# Patient Record
Sex: FEMALE | Race: WHITE | NOT HISPANIC OR LATINO | Employment: OTHER | URBAN - METROPOLITAN AREA
[De-identification: names, ages, dates, MRNs, and addresses within clinical notes are randomized per-mention and may not be internally consistent; named-entity substitution may affect disease eponyms.]

---

## 2021-01-01 ENCOUNTER — APPOINTMENT (OUTPATIENT)
Dept: GENERAL RADIOLOGY | Facility: CLINIC | Age: 76
DRG: 870 | End: 2021-01-01
Payer: MEDICARE

## 2021-01-01 ENCOUNTER — HOSPITAL ENCOUNTER (EMERGENCY)
Facility: CLINIC | Age: 76
Discharge: HOME | End: 2021-03-22
Attending: EMERGENCY MEDICINE | Admitting: EMERGENCY MEDICINE
Payer: MEDICARE

## 2021-01-01 ENCOUNTER — APPOINTMENT (OUTPATIENT)
Dept: CARDIOLOGY | Facility: CLINIC | Age: 76
DRG: 870 | End: 2021-01-01
Attending: INTERNAL MEDICINE
Payer: MEDICARE

## 2021-01-01 ENCOUNTER — APPOINTMENT (OUTPATIENT)
Dept: GENERAL RADIOLOGY | Facility: CLINIC | Age: 76
End: 2021-01-01
Attending: EMERGENCY MEDICINE
Payer: MEDICARE

## 2021-01-01 ENCOUNTER — APPOINTMENT (OUTPATIENT)
Dept: CT IMAGING | Facility: CLINIC | Age: 76
DRG: 870 | End: 2021-01-01
Attending: EMERGENCY MEDICINE
Payer: MEDICARE

## 2021-01-01 ENCOUNTER — APPOINTMENT (OUTPATIENT)
Dept: GENERAL RADIOLOGY | Facility: CLINIC | Age: 76
DRG: 870 | End: 2021-01-01
Attending: STUDENT IN AN ORGANIZED HEALTH CARE EDUCATION/TRAINING PROGRAM
Payer: MEDICARE

## 2021-01-01 ENCOUNTER — APPOINTMENT (OUTPATIENT)
Dept: GENERAL RADIOLOGY | Facility: CLINIC | Age: 76
DRG: 870 | End: 2021-01-01
Attending: EMERGENCY MEDICINE
Payer: MEDICARE

## 2021-01-01 ENCOUNTER — TRANSFERRED RECORDS (OUTPATIENT)
Dept: HEALTH INFORMATION MANAGEMENT | Facility: CLINIC | Age: 76
End: 2021-01-01

## 2021-01-01 ENCOUNTER — APPOINTMENT (OUTPATIENT)
Dept: GENERAL RADIOLOGY | Facility: CLINIC | Age: 76
DRG: 870 | End: 2021-01-01
Attending: NURSE PRACTITIONER
Payer: MEDICARE

## 2021-01-01 ENCOUNTER — APPOINTMENT (OUTPATIENT)
Dept: CT IMAGING | Facility: CLINIC | Age: 76
End: 2021-01-01
Attending: EMERGENCY MEDICINE
Payer: MEDICARE

## 2021-01-01 ENCOUNTER — APPOINTMENT (OUTPATIENT)
Dept: GENERAL RADIOLOGY | Facility: CLINIC | Age: 76
DRG: 870 | End: 2021-01-01
Attending: INTERNAL MEDICINE
Payer: MEDICARE

## 2021-01-01 ENCOUNTER — HOSPITAL ENCOUNTER (INPATIENT)
Facility: CLINIC | Age: 76
LOS: 20 days | DRG: 870 | End: 2021-12-18
Attending: EMERGENCY MEDICINE | Admitting: HOSPITALIST
Payer: MEDICARE

## 2021-01-01 VITALS
HEIGHT: 67 IN | BODY MASS INDEX: 33.27 KG/M2 | RESPIRATION RATE: 15 BRPM | OXYGEN SATURATION: 95 % | HEART RATE: 67 BPM | DIASTOLIC BLOOD PRESSURE: 84 MMHG | WEIGHT: 212 LBS | SYSTOLIC BLOOD PRESSURE: 149 MMHG | TEMPERATURE: 97.5 F

## 2021-01-01 VITALS
TEMPERATURE: 98.4 F | BODY MASS INDEX: 29.8 KG/M2 | DIASTOLIC BLOOD PRESSURE: 54 MMHG | WEIGHT: 190.26 LBS | SYSTOLIC BLOOD PRESSURE: 107 MMHG | OXYGEN SATURATION: 14 %

## 2021-01-01 DIAGNOSIS — R06.09 DYSPNEA ON EXERTION: ICD-10-CM

## 2021-01-01 DIAGNOSIS — J18.9 PNEUMONIA OF RIGHT LOWER LOBE DUE TO INFECTIOUS ORGANISM: ICD-10-CM

## 2021-01-01 DIAGNOSIS — U07.1 PNEUMONIA DUE TO 2019 NOVEL CORONAVIRUS: ICD-10-CM

## 2021-01-01 DIAGNOSIS — G93.40 ENCEPHALOPATHY: ICD-10-CM

## 2021-01-01 DIAGNOSIS — J12.82 PNEUMONIA DUE TO 2019 NOVEL CORONAVIRUS: ICD-10-CM

## 2021-01-01 DIAGNOSIS — J96.01 ACUTE RESPIRATORY FAILURE WITH HYPOXIA (H): ICD-10-CM

## 2021-01-01 LAB
ABO/RH(D): NORMAL
ALBUMIN SERPL-MCNC: 1.8 G/DL (ref 3.4–5)
ALBUMIN SERPL-MCNC: 1.8 G/DL (ref 3.4–5)
ALBUMIN SERPL-MCNC: 1.9 G/DL (ref 3.4–5)
ALBUMIN SERPL-MCNC: 2 G/DL (ref 3.4–5)
ALBUMIN SERPL-MCNC: 2.3 G/DL (ref 3.4–5)
ALBUMIN SERPL-MCNC: 3 G/DL (ref 3.4–5)
ALP SERPL-CCNC: 40 U/L (ref 40–150)
ALP SERPL-CCNC: 45 U/L (ref 40–150)
ALP SERPL-CCNC: 49 U/L (ref 40–150)
ALP SERPL-CCNC: 50 U/L (ref 40–150)
ALP SERPL-CCNC: 55 U/L (ref 40–150)
ALP SERPL-CCNC: 58 U/L (ref 40–150)
ALT SERPL W P-5'-P-CCNC: 25 U/L (ref 0–50)
ALT SERPL W P-5'-P-CCNC: 25 U/L (ref 0–50)
ALT SERPL W P-5'-P-CCNC: 29 U/L (ref 0–50)
ALT SERPL W P-5'-P-CCNC: 37 U/L (ref 0–50)
ALT SERPL W P-5'-P-CCNC: 38 U/L (ref 0–50)
ALT SERPL W P-5'-P-CCNC: 60 U/L (ref 0–50)
ANION GAP SERPL CALCULATED.3IONS-SCNC: 2 MMOL/L (ref 3–14)
ANION GAP SERPL CALCULATED.3IONS-SCNC: 3 MMOL/L (ref 3–14)
ANION GAP SERPL CALCULATED.3IONS-SCNC: 5 MMOL/L (ref 3–14)
ANION GAP SERPL CALCULATED.3IONS-SCNC: 5 MMOL/L (ref 3–14)
ANION GAP SERPL CALCULATED.3IONS-SCNC: 6 MMOL/L (ref 3–14)
ANION GAP SERPL CALCULATED.3IONS-SCNC: 7 MMOL/L (ref 3–14)
ANION GAP SERPL CALCULATED.3IONS-SCNC: 7 MMOL/L (ref 3–14)
ANION GAP SERPL CALCULATED.3IONS-SCNC: <1 MMOL/L (ref 3–14)
APTT PPP: 44 SECONDS (ref 22–38)
AST SERPL W P-5'-P-CCNC: 104 U/L (ref 0–45)
AST SERPL W P-5'-P-CCNC: 21 U/L (ref 0–45)
AST SERPL W P-5'-P-CCNC: 23 U/L (ref 0–45)
AST SERPL W P-5'-P-CCNC: 30 U/L (ref 0–45)
AST SERPL W P-5'-P-CCNC: 39 U/L (ref 0–45)
AST SERPL W P-5'-P-CCNC: 58 U/L (ref 0–45)
BACTERIA BLD CULT: NO GROWTH
BASE EXCESS BLDA CALC-SCNC: -0.9 MMOL/L (ref -9–1.8)
BASE EXCESS BLDA CALC-SCNC: 0.1 MMOL/L (ref -9–1.8)
BASE EXCESS BLDA CALC-SCNC: 1.3 MMOL/L (ref -9–1.8)
BASE EXCESS BLDA CALC-SCNC: 1.3 MMOL/L (ref -9–1.8)
BASE EXCESS BLDA CALC-SCNC: 10.2 MMOL/L (ref -9–1.8)
BASE EXCESS BLDA CALC-SCNC: 10.9 MMOL/L (ref -9–1.8)
BASE EXCESS BLDA CALC-SCNC: 11 MMOL/L (ref -9–1.8)
BASE EXCESS BLDA CALC-SCNC: 11.4 MMOL/L (ref -9–1.8)
BASE EXCESS BLDA CALC-SCNC: 11.4 MMOL/L (ref -9–1.8)
BASE EXCESS BLDA CALC-SCNC: 12 MMOL/L (ref -9–1.8)
BASE EXCESS BLDA CALC-SCNC: 12.9 MMOL/L (ref -9–1.8)
BASE EXCESS BLDA CALC-SCNC: 14.3 MMOL/L (ref -9–1.8)
BASE EXCESS BLDA CALC-SCNC: 14.5 MMOL/L (ref -9–1.8)
BASE EXCESS BLDA CALC-SCNC: 15 MMOL/L (ref -9–1.8)
BASE EXCESS BLDA CALC-SCNC: 15.7 MMOL/L (ref -9–1.8)
BASE EXCESS BLDA CALC-SCNC: 16 MMOL/L (ref -9–1.8)
BASE EXCESS BLDA CALC-SCNC: 16.5 MMOL/L (ref -9–1.8)
BASE EXCESS BLDA CALC-SCNC: 16.6 MMOL/L (ref -9–1.8)
BASE EXCESS BLDA CALC-SCNC: 17.2 MMOL/L (ref -9–1.8)
BASE EXCESS BLDA CALC-SCNC: 17.8 MMOL/L (ref -9–1.8)
BASE EXCESS BLDA CALC-SCNC: 2 MMOL/L (ref -9–1.8)
BASE EXCESS BLDA CALC-SCNC: 2.5 MMOL/L (ref -9–1.8)
BASE EXCESS BLDA CALC-SCNC: 3.8 MMOL/L (ref -9–1.8)
BASE EXCESS BLDA CALC-SCNC: 5.2 MMOL/L (ref -9–1.8)
BASE EXCESS BLDA CALC-SCNC: 6.5 MMOL/L (ref -9–1.8)
BASE EXCESS BLDA CALC-SCNC: 6.8 MMOL/L (ref -9–1.8)
BASE EXCESS BLDA CALC-SCNC: 7.8 MMOL/L (ref -9–1.8)
BASE EXCESS BLDA CALC-SCNC: 8.7 MMOL/L (ref -9–1.8)
BASE EXCESS BLDA CALC-SCNC: 8.8 MMOL/L (ref -9–1.8)
BASOPHILS # BLD AUTO: 0 10E3/UL (ref 0–0.2)
BASOPHILS # BLD AUTO: 0 10E3/UL (ref 0–0.2)
BASOPHILS # BLD AUTO: 0.1 10E9/L (ref 0–0.2)
BASOPHILS # BLD MANUAL: 0 10E3/UL (ref 0–0.2)
BASOPHILS NFR BLD AUTO: 0 %
BASOPHILS NFR BLD AUTO: 0 %
BASOPHILS NFR BLD AUTO: 0.9 %
BASOPHILS NFR BLD MANUAL: 0 %
BILIRUB DIRECT SERPL-MCNC: <0.1 MG/DL (ref 0–0.2)
BILIRUB SERPL-MCNC: 0.1 MG/DL (ref 0.2–1.3)
BILIRUB SERPL-MCNC: 0.2 MG/DL (ref 0.2–1.3)
BILIRUB SERPL-MCNC: 0.3 MG/DL (ref 0.2–1.3)
BILIRUB SERPL-MCNC: 0.3 MG/DL (ref 0.2–1.3)
BILIRUB SERPL-MCNC: 0.4 MG/DL (ref 0.2–1.3)
BILIRUB SERPL-MCNC: 0.4 MG/DL (ref 0.2–1.3)
BUN SERPL-MCNC: 19 MG/DL (ref 7–30)
BUN SERPL-MCNC: 22 MG/DL (ref 7–30)
BUN SERPL-MCNC: 25 MG/DL (ref 7–30)
BUN SERPL-MCNC: 26 MG/DL (ref 7–30)
BUN SERPL-MCNC: 27 MG/DL (ref 7–30)
BUN SERPL-MCNC: 27 MG/DL (ref 7–30)
BUN SERPL-MCNC: 29 MG/DL (ref 7–30)
BUN SERPL-MCNC: 30 MG/DL (ref 7–30)
BUN SERPL-MCNC: 32 MG/DL (ref 7–30)
BUN SERPL-MCNC: 32 MG/DL (ref 7–30)
BUN SERPL-MCNC: 33 MG/DL (ref 7–30)
BUN SERPL-MCNC: 36 MG/DL (ref 7–30)
BUN SERPL-MCNC: 38 MG/DL (ref 7–30)
BUN SERPL-MCNC: 39 MG/DL (ref 7–30)
BUN SERPL-MCNC: 39 MG/DL (ref 7–30)
BUN SERPL-MCNC: 42 MG/DL (ref 7–30)
BUN SERPL-MCNC: 46 MG/DL (ref 7–30)
BUN SERPL-MCNC: 49 MG/DL (ref 7–30)
BUN SERPL-MCNC: 53 MG/DL (ref 7–30)
BUN SERPL-MCNC: 54 MG/DL (ref 7–30)
CALCIUM SERPL-MCNC: 7.6 MG/DL (ref 8.5–10.1)
CALCIUM SERPL-MCNC: 7.7 MG/DL (ref 8.5–10.1)
CALCIUM SERPL-MCNC: 7.9 MG/DL (ref 8.5–10.1)
CALCIUM SERPL-MCNC: 7.9 MG/DL (ref 8.5–10.1)
CALCIUM SERPL-MCNC: 8 MG/DL (ref 8.5–10.1)
CALCIUM SERPL-MCNC: 8 MG/DL (ref 8.5–10.1)
CALCIUM SERPL-MCNC: 8.1 MG/DL (ref 8.5–10.1)
CALCIUM SERPL-MCNC: 8.1 MG/DL (ref 8.5–10.1)
CALCIUM SERPL-MCNC: 8.2 MG/DL (ref 8.5–10.1)
CALCIUM SERPL-MCNC: 8.2 MG/DL (ref 8.5–10.1)
CALCIUM SERPL-MCNC: 8.3 MG/DL (ref 8.5–10.1)
CALCIUM SERPL-MCNC: 8.3 MG/DL (ref 8.5–10.1)
CALCIUM SERPL-MCNC: 8.4 MG/DL (ref 8.5–10.1)
CALCIUM SERPL-MCNC: 8.5 MG/DL (ref 8.5–10.1)
CALCIUM SERPL-MCNC: 8.5 MG/DL (ref 8.5–10.1)
CALCIUM SERPL-MCNC: 8.6 MG/DL (ref 8.5–10.1)
CALCIUM SERPL-MCNC: 8.7 MG/DL (ref 8.5–10.1)
CALCIUM SERPL-MCNC: 9.1 MG/DL (ref 8.5–10.1)
CHLORIDE BLD-SCNC: 100 MMOL/L (ref 94–109)
CHLORIDE BLD-SCNC: 103 MMOL/L (ref 94–109)
CHLORIDE BLD-SCNC: 104 MMOL/L (ref 94–109)
CHLORIDE BLD-SCNC: 105 MMOL/L (ref 94–109)
CHLORIDE BLD-SCNC: 106 MMOL/L (ref 94–109)
CHLORIDE BLD-SCNC: 107 MMOL/L (ref 94–109)
CHLORIDE BLD-SCNC: 109 MMOL/L (ref 94–109)
CHLORIDE BLD-SCNC: 110 MMOL/L (ref 94–109)
CHLORIDE BLD-SCNC: 110 MMOL/L (ref 94–109)
CHLORIDE BLD-SCNC: 111 MMOL/L (ref 94–109)
CHLORIDE BLD-SCNC: 112 MMOL/L (ref 94–109)
CHLORIDE BLD-SCNC: 112 MMOL/L (ref 94–109)
CHLORIDE BLD-SCNC: 113 MMOL/L (ref 94–109)
CHLORIDE BLD-SCNC: 113 MMOL/L (ref 94–109)
CHLORIDE BLD-SCNC: 116 MMOL/L (ref 94–109)
CHLORIDE SERPL-SCNC: 109 MMOL/L (ref 94–109)
CO2 SERPL-SCNC: 21 MMOL/L (ref 20–32)
CO2 SERPL-SCNC: 24 MMOL/L (ref 20–32)
CO2 SERPL-SCNC: 25 MMOL/L (ref 20–32)
CO2 SERPL-SCNC: 28 MMOL/L (ref 20–32)
CO2 SERPL-SCNC: 29 MMOL/L (ref 20–32)
CO2 SERPL-SCNC: 32 MMOL/L (ref 20–32)
CO2 SERPL-SCNC: 33 MMOL/L (ref 20–32)
CO2 SERPL-SCNC: 33 MMOL/L (ref 20–32)
CO2 SERPL-SCNC: 34 MMOL/L (ref 20–32)
CO2 SERPL-SCNC: 35 MMOL/L (ref 20–32)
CO2 SERPL-SCNC: 35 MMOL/L (ref 20–32)
CO2 SERPL-SCNC: 36 MMOL/L (ref 20–32)
CO2 SERPL-SCNC: 36 MMOL/L (ref 20–32)
CO2 SERPL-SCNC: 37 MMOL/L (ref 20–32)
CO2 SERPL-SCNC: 38 MMOL/L (ref 20–32)
CO2 SERPL-SCNC: 39 MMOL/L (ref 20–32)
CO2 SERPL-SCNC: 39 MMOL/L (ref 20–32)
CO2 SERPL-SCNC: 40 MMOL/L (ref 20–32)
CO2 SERPL-SCNC: 40 MMOL/L (ref 20–32)
CREAT SERPL-MCNC: 0.56 MG/DL (ref 0.52–1.04)
CREAT SERPL-MCNC: 0.57 MG/DL (ref 0.52–1.04)
CREAT SERPL-MCNC: 0.58 MG/DL (ref 0.52–1.04)
CREAT SERPL-MCNC: 0.59 MG/DL (ref 0.52–1.04)
CREAT SERPL-MCNC: 0.59 MG/DL (ref 0.52–1.04)
CREAT SERPL-MCNC: 0.61 MG/DL (ref 0.52–1.04)
CREAT SERPL-MCNC: 0.61 MG/DL (ref 0.52–1.04)
CREAT SERPL-MCNC: 0.62 MG/DL (ref 0.52–1.04)
CREAT SERPL-MCNC: 0.63 MG/DL (ref 0.52–1.04)
CREAT SERPL-MCNC: 0.64 MG/DL (ref 0.52–1.04)
CREAT SERPL-MCNC: 0.64 MG/DL (ref 0.52–1.04)
CREAT SERPL-MCNC: 0.65 MG/DL (ref 0.52–1.04)
CREAT SERPL-MCNC: 0.65 MG/DL (ref 0.52–1.04)
CREAT SERPL-MCNC: 0.68 MG/DL (ref 0.52–1.04)
CREAT SERPL-MCNC: 0.68 MG/DL (ref 0.52–1.04)
CREAT SERPL-MCNC: 0.69 MG/DL (ref 0.52–1.04)
CREAT SERPL-MCNC: 0.86 MG/DL (ref 0.52–1.04)
CREAT SERPL-MCNC: 0.9 MG/DL (ref 0.52–1.04)
CREAT SERPL-MCNC: 1.05 MG/DL (ref 0.52–1.04)
CREAT SERPL-MCNC: 1.07 MG/DL (ref 0.52–1.04)
CRP SERPL-MCNC: 23 MG/L (ref 0–8)
CRP SERPL-MCNC: 27.9 MG/L (ref 0–8)
CRP SERPL-MCNC: 30 MG/L (ref 0–8)
CRP SERPL-MCNC: 42.9 MG/L (ref 0–8)
CRP SERPL-MCNC: 45.7 MG/L (ref 0–8)
CRP SERPL-MCNC: 65 MG/L (ref 0–8)
CRP SERPL-MCNC: <2.9 MG/L (ref 0–8)
D DIMER PPP FEU-MCNC: 0.9 UG/ML FEU (ref 0–0.5)
D DIMER PPP FEU-MCNC: 1.45 UG/ML FEU (ref 0–0.5)
D DIMER PPP FEU-MCNC: 1.49 UG/ML FEU (ref 0–0.5)
D DIMER PPP FEU-MCNC: 1.96 UG/ML FEU (ref 0–0.5)
D DIMER PPP FEU-MCNC: 2.18 UG/ML FEU (ref 0–0.5)
D DIMER PPP FEU-MCNC: 2.46 UG/ML FEU (ref 0–0.5)
D DIMER PPP FEU-MCNC: 2.52 UG/ML FEU (ref 0–0.5)
DIFFERENTIAL METHOD BLD: NORMAL
EOSINOPHIL # BLD AUTO: 0 10E3/UL (ref 0–0.7)
EOSINOPHIL # BLD AUTO: 0 10E3/UL (ref 0–0.7)
EOSINOPHIL # BLD AUTO: 0.2 10E9/L (ref 0–0.7)
EOSINOPHIL # BLD MANUAL: 0 10E3/UL (ref 0–0.7)
EOSINOPHIL NFR BLD AUTO: 0 %
EOSINOPHIL NFR BLD AUTO: 0 %
EOSINOPHIL NFR BLD AUTO: 1.7 %
EOSINOPHIL NFR BLD MANUAL: 0 %
ERYTHROCYTE [DISTWIDTH] IN BLOOD BY AUTOMATED COUNT: 12.8 % (ref 10–15)
ERYTHROCYTE [DISTWIDTH] IN BLOOD BY AUTOMATED COUNT: 13.3 % (ref 10–15)
ERYTHROCYTE [DISTWIDTH] IN BLOOD BY AUTOMATED COUNT: 13.6 % (ref 10–15)
ERYTHROCYTE [DISTWIDTH] IN BLOOD BY AUTOMATED COUNT: 13.6 % (ref 10–15)
ERYTHROCYTE [DISTWIDTH] IN BLOOD BY AUTOMATED COUNT: 13.9 % (ref 10–15)
ERYTHROCYTE [DISTWIDTH] IN BLOOD BY AUTOMATED COUNT: 14.1 % (ref 10–15)
ERYTHROCYTE [DISTWIDTH] IN BLOOD BY AUTOMATED COUNT: 14.2 % (ref 10–15)
ERYTHROCYTE [DISTWIDTH] IN BLOOD BY AUTOMATED COUNT: 14.2 % (ref 10–15)
ERYTHROCYTE [DISTWIDTH] IN BLOOD BY AUTOMATED COUNT: 14.4 % (ref 10–15)
ERYTHROCYTE [DISTWIDTH] IN BLOOD BY AUTOMATED COUNT: 14.6 % (ref 10–15)
ERYTHROCYTE [DISTWIDTH] IN BLOOD BY AUTOMATED COUNT: 14.9 % (ref 10–15)
ERYTHROCYTE [DISTWIDTH] IN BLOOD BY AUTOMATED COUNT: 15.3 % (ref 10–15)
ERYTHROCYTE [DISTWIDTH] IN BLOOD BY AUTOMATED COUNT: 15.5 % (ref 10–15)
ERYTHROCYTE [DISTWIDTH] IN BLOOD BY AUTOMATED COUNT: 15.8 % (ref 10–15)
ERYTHROCYTE [DISTWIDTH] IN BLOOD BY AUTOMATED COUNT: 15.9 % (ref 10–15)
ERYTHROCYTE [DISTWIDTH] IN BLOOD BY AUTOMATED COUNT: 16.1 % (ref 10–15)
FERRITIN SERPL-MCNC: 2168 NG/ML (ref 8–252)
FIBRINOGEN PPP-MCNC: 485 MG/DL (ref 170–490)
FIBRINOGEN PPP-MCNC: 495 MG/DL (ref 170–490)
FIBRINOGEN PPP-MCNC: 522 MG/DL (ref 170–490)
FIBRINOGEN PPP-MCNC: 546 MG/DL (ref 170–490)
FIBRINOGEN PPP-MCNC: 559 MG/DL (ref 170–490)
FIBRINOGEN PPP-MCNC: 565 MG/DL (ref 170–490)
FLUAV RNA RESP QL NAA+PROBE: NEGATIVE
FLUAV RNA SPEC QL NAA+PROBE: NEGATIVE
FLUBV RNA RESP QL NAA+PROBE: NEGATIVE
FLUBV RNA RESP QL NAA+PROBE: NEGATIVE
GFR SERPL CREATININE-BSD FRML MDRD: 51 ML/MIN/1.73M2
GFR SERPL CREATININE-BSD FRML MDRD: 52 ML/MIN/1.73M2
GFR SERPL CREATININE-BSD FRML MDRD: 62 ML/MIN/{1.73_M2}
GFR SERPL CREATININE-BSD FRML MDRD: 66 ML/MIN/1.73M2
GFR SERPL CREATININE-BSD FRML MDRD: 85 ML/MIN/1.73M2
GFR SERPL CREATININE-BSD FRML MDRD: 86 ML/MIN/1.73M2
GFR SERPL CREATININE-BSD FRML MDRD: 86 ML/MIN/1.73M2
GFR SERPL CREATININE-BSD FRML MDRD: 87 ML/MIN/1.73M2
GFR SERPL CREATININE-BSD FRML MDRD: 87 ML/MIN/1.73M2
GFR SERPL CREATININE-BSD FRML MDRD: 88 ML/MIN/1.73M2
GFR SERPL CREATININE-BSD FRML MDRD: 89 ML/MIN/1.73M2
GFR SERPL CREATININE-BSD FRML MDRD: 89 ML/MIN/1.73M2
GFR SERPL CREATININE-BSD FRML MDRD: 90 ML/MIN/1.73M2
GFR SERPL CREATININE-BSD FRML MDRD: >90 ML/MIN/1.73M2
GFR SERPL CREATININE-BSD FRML MDRD: >90 ML/MIN/1.73M2
GLUCOSE BLD-MCNC: 111 MG/DL (ref 70–99)
GLUCOSE BLD-MCNC: 113 MG/DL (ref 70–99)
GLUCOSE BLD-MCNC: 127 MG/DL (ref 70–99)
GLUCOSE BLD-MCNC: 150 MG/DL (ref 70–99)
GLUCOSE BLD-MCNC: 152 MG/DL (ref 70–99)
GLUCOSE BLD-MCNC: 170 MG/DL (ref 70–99)
GLUCOSE BLD-MCNC: 178 MG/DL (ref 70–99)
GLUCOSE BLD-MCNC: 197 MG/DL (ref 70–99)
GLUCOSE BLD-MCNC: 204 MG/DL (ref 70–99)
GLUCOSE BLD-MCNC: 214 MG/DL (ref 70–99)
GLUCOSE BLD-MCNC: 221 MG/DL (ref 70–99)
GLUCOSE BLD-MCNC: 222 MG/DL (ref 70–99)
GLUCOSE BLD-MCNC: 223 MG/DL (ref 70–99)
GLUCOSE BLD-MCNC: 236 MG/DL (ref 70–99)
GLUCOSE BLD-MCNC: 266 MG/DL (ref 70–99)
GLUCOSE BLD-MCNC: 274 MG/DL (ref 70–99)
GLUCOSE BLD-MCNC: 279 MG/DL (ref 70–99)
GLUCOSE BLD-MCNC: 279 MG/DL (ref 70–99)
GLUCOSE BLD-MCNC: 289 MG/DL (ref 70–99)
GLUCOSE BLD-MCNC: 297 MG/DL (ref 70–99)
GLUCOSE BLD-MCNC: 75 MG/DL (ref 70–99)
GLUCOSE BLDC GLUCOMTR-MCNC: 103 MG/DL (ref 70–99)
GLUCOSE BLDC GLUCOMTR-MCNC: 103 MG/DL (ref 70–99)
GLUCOSE BLDC GLUCOMTR-MCNC: 107 MG/DL (ref 70–99)
GLUCOSE BLDC GLUCOMTR-MCNC: 112 MG/DL (ref 70–99)
GLUCOSE BLDC GLUCOMTR-MCNC: 117 MG/DL (ref 70–99)
GLUCOSE BLDC GLUCOMTR-MCNC: 119 MG/DL (ref 70–99)
GLUCOSE BLDC GLUCOMTR-MCNC: 120 MG/DL (ref 70–99)
GLUCOSE BLDC GLUCOMTR-MCNC: 122 MG/DL (ref 70–99)
GLUCOSE BLDC GLUCOMTR-MCNC: 124 MG/DL (ref 70–99)
GLUCOSE BLDC GLUCOMTR-MCNC: 125 MG/DL (ref 70–99)
GLUCOSE BLDC GLUCOMTR-MCNC: 128 MG/DL (ref 70–99)
GLUCOSE BLDC GLUCOMTR-MCNC: 130 MG/DL (ref 70–99)
GLUCOSE BLDC GLUCOMTR-MCNC: 135 MG/DL (ref 70–99)
GLUCOSE BLDC GLUCOMTR-MCNC: 136 MG/DL (ref 70–99)
GLUCOSE BLDC GLUCOMTR-MCNC: 140 MG/DL (ref 70–99)
GLUCOSE BLDC GLUCOMTR-MCNC: 142 MG/DL (ref 70–99)
GLUCOSE BLDC GLUCOMTR-MCNC: 143 MG/DL (ref 70–99)
GLUCOSE BLDC GLUCOMTR-MCNC: 144 MG/DL (ref 70–99)
GLUCOSE BLDC GLUCOMTR-MCNC: 145 MG/DL (ref 70–99)
GLUCOSE BLDC GLUCOMTR-MCNC: 146 MG/DL (ref 70–99)
GLUCOSE BLDC GLUCOMTR-MCNC: 148 MG/DL (ref 70–99)
GLUCOSE BLDC GLUCOMTR-MCNC: 150 MG/DL (ref 70–99)
GLUCOSE BLDC GLUCOMTR-MCNC: 150 MG/DL (ref 70–99)
GLUCOSE BLDC GLUCOMTR-MCNC: 151 MG/DL (ref 70–99)
GLUCOSE BLDC GLUCOMTR-MCNC: 153 MG/DL (ref 70–99)
GLUCOSE BLDC GLUCOMTR-MCNC: 153 MG/DL (ref 70–99)
GLUCOSE BLDC GLUCOMTR-MCNC: 154 MG/DL (ref 70–99)
GLUCOSE BLDC GLUCOMTR-MCNC: 154 MG/DL (ref 70–99)
GLUCOSE BLDC GLUCOMTR-MCNC: 155 MG/DL (ref 70–99)
GLUCOSE BLDC GLUCOMTR-MCNC: 156 MG/DL (ref 70–99)
GLUCOSE BLDC GLUCOMTR-MCNC: 156 MG/DL (ref 70–99)
GLUCOSE BLDC GLUCOMTR-MCNC: 157 MG/DL (ref 70–99)
GLUCOSE BLDC GLUCOMTR-MCNC: 165 MG/DL (ref 70–99)
GLUCOSE BLDC GLUCOMTR-MCNC: 165 MG/DL (ref 70–99)
GLUCOSE BLDC GLUCOMTR-MCNC: 166 MG/DL (ref 70–99)
GLUCOSE BLDC GLUCOMTR-MCNC: 166 MG/DL (ref 70–99)
GLUCOSE BLDC GLUCOMTR-MCNC: 167 MG/DL (ref 70–99)
GLUCOSE BLDC GLUCOMTR-MCNC: 168 MG/DL (ref 70–99)
GLUCOSE BLDC GLUCOMTR-MCNC: 169 MG/DL (ref 70–99)
GLUCOSE BLDC GLUCOMTR-MCNC: 170 MG/DL (ref 70–99)
GLUCOSE BLDC GLUCOMTR-MCNC: 170 MG/DL (ref 70–99)
GLUCOSE BLDC GLUCOMTR-MCNC: 172 MG/DL (ref 70–99)
GLUCOSE BLDC GLUCOMTR-MCNC: 173 MG/DL (ref 70–99)
GLUCOSE BLDC GLUCOMTR-MCNC: 174 MG/DL (ref 70–99)
GLUCOSE BLDC GLUCOMTR-MCNC: 176 MG/DL (ref 70–99)
GLUCOSE BLDC GLUCOMTR-MCNC: 179 MG/DL (ref 70–99)
GLUCOSE BLDC GLUCOMTR-MCNC: 180 MG/DL (ref 70–99)
GLUCOSE BLDC GLUCOMTR-MCNC: 181 MG/DL (ref 70–99)
GLUCOSE BLDC GLUCOMTR-MCNC: 182 MG/DL (ref 70–99)
GLUCOSE BLDC GLUCOMTR-MCNC: 189 MG/DL (ref 70–99)
GLUCOSE BLDC GLUCOMTR-MCNC: 193 MG/DL (ref 70–99)
GLUCOSE BLDC GLUCOMTR-MCNC: 195 MG/DL (ref 70–99)
GLUCOSE BLDC GLUCOMTR-MCNC: 195 MG/DL (ref 70–99)
GLUCOSE BLDC GLUCOMTR-MCNC: 196 MG/DL (ref 70–99)
GLUCOSE BLDC GLUCOMTR-MCNC: 196 MG/DL (ref 70–99)
GLUCOSE BLDC GLUCOMTR-MCNC: 199 MG/DL (ref 70–99)
GLUCOSE BLDC GLUCOMTR-MCNC: 204 MG/DL (ref 70–99)
GLUCOSE BLDC GLUCOMTR-MCNC: 204 MG/DL (ref 70–99)
GLUCOSE BLDC GLUCOMTR-MCNC: 205 MG/DL (ref 70–99)
GLUCOSE BLDC GLUCOMTR-MCNC: 205 MG/DL (ref 70–99)
GLUCOSE BLDC GLUCOMTR-MCNC: 207 MG/DL (ref 70–99)
GLUCOSE BLDC GLUCOMTR-MCNC: 208 MG/DL (ref 70–99)
GLUCOSE BLDC GLUCOMTR-MCNC: 209 MG/DL (ref 70–99)
GLUCOSE BLDC GLUCOMTR-MCNC: 211 MG/DL (ref 70–99)
GLUCOSE BLDC GLUCOMTR-MCNC: 212 MG/DL (ref 70–99)
GLUCOSE BLDC GLUCOMTR-MCNC: 213 MG/DL (ref 70–99)
GLUCOSE BLDC GLUCOMTR-MCNC: 213 MG/DL (ref 70–99)
GLUCOSE BLDC GLUCOMTR-MCNC: 219 MG/DL (ref 70–99)
GLUCOSE BLDC GLUCOMTR-MCNC: 219 MG/DL (ref 70–99)
GLUCOSE BLDC GLUCOMTR-MCNC: 223 MG/DL (ref 70–99)
GLUCOSE BLDC GLUCOMTR-MCNC: 225 MG/DL (ref 70–99)
GLUCOSE BLDC GLUCOMTR-MCNC: 227 MG/DL (ref 70–99)
GLUCOSE BLDC GLUCOMTR-MCNC: 231 MG/DL (ref 70–99)
GLUCOSE BLDC GLUCOMTR-MCNC: 232 MG/DL (ref 70–99)
GLUCOSE BLDC GLUCOMTR-MCNC: 232 MG/DL (ref 70–99)
GLUCOSE BLDC GLUCOMTR-MCNC: 233 MG/DL (ref 70–99)
GLUCOSE BLDC GLUCOMTR-MCNC: 233 MG/DL (ref 70–99)
GLUCOSE BLDC GLUCOMTR-MCNC: 234 MG/DL (ref 70–99)
GLUCOSE BLDC GLUCOMTR-MCNC: 238 MG/DL (ref 70–99)
GLUCOSE BLDC GLUCOMTR-MCNC: 240 MG/DL (ref 70–99)
GLUCOSE BLDC GLUCOMTR-MCNC: 242 MG/DL (ref 70–99)
GLUCOSE BLDC GLUCOMTR-MCNC: 243 MG/DL (ref 70–99)
GLUCOSE BLDC GLUCOMTR-MCNC: 244 MG/DL (ref 70–99)
GLUCOSE BLDC GLUCOMTR-MCNC: 247 MG/DL (ref 70–99)
GLUCOSE BLDC GLUCOMTR-MCNC: 248 MG/DL (ref 70–99)
GLUCOSE BLDC GLUCOMTR-MCNC: 251 MG/DL (ref 70–99)
GLUCOSE BLDC GLUCOMTR-MCNC: 252 MG/DL (ref 70–99)
GLUCOSE BLDC GLUCOMTR-MCNC: 258 MG/DL (ref 70–99)
GLUCOSE BLDC GLUCOMTR-MCNC: 261 MG/DL (ref 70–99)
GLUCOSE BLDC GLUCOMTR-MCNC: 266 MG/DL (ref 70–99)
GLUCOSE BLDC GLUCOMTR-MCNC: 267 MG/DL (ref 70–99)
GLUCOSE BLDC GLUCOMTR-MCNC: 269 MG/DL (ref 70–99)
GLUCOSE BLDC GLUCOMTR-MCNC: 270 MG/DL (ref 70–99)
GLUCOSE BLDC GLUCOMTR-MCNC: 272 MG/DL (ref 70–99)
GLUCOSE BLDC GLUCOMTR-MCNC: 274 MG/DL (ref 70–99)
GLUCOSE BLDC GLUCOMTR-MCNC: 274 MG/DL (ref 70–99)
GLUCOSE BLDC GLUCOMTR-MCNC: 280 MG/DL (ref 70–99)
GLUCOSE BLDC GLUCOMTR-MCNC: 280 MG/DL (ref 70–99)
GLUCOSE BLDC GLUCOMTR-MCNC: 281 MG/DL (ref 70–99)
GLUCOSE BLDC GLUCOMTR-MCNC: 282 MG/DL (ref 70–99)
GLUCOSE BLDC GLUCOMTR-MCNC: 284 MG/DL (ref 70–99)
GLUCOSE BLDC GLUCOMTR-MCNC: 288 MG/DL (ref 70–99)
GLUCOSE BLDC GLUCOMTR-MCNC: 291 MG/DL (ref 70–99)
GLUCOSE BLDC GLUCOMTR-MCNC: 296 MG/DL (ref 70–99)
GLUCOSE BLDC GLUCOMTR-MCNC: 302 MG/DL (ref 70–99)
GLUCOSE BLDC GLUCOMTR-MCNC: 304 MG/DL (ref 70–99)
GLUCOSE BLDC GLUCOMTR-MCNC: 309 MG/DL (ref 70–99)
GLUCOSE BLDC GLUCOMTR-MCNC: 318 MG/DL (ref 70–99)
GLUCOSE BLDC GLUCOMTR-MCNC: 318 MG/DL (ref 70–99)
GLUCOSE BLDC GLUCOMTR-MCNC: 80 MG/DL (ref 70–99)
GLUCOSE BLDC GLUCOMTR-MCNC: 87 MG/DL (ref 70–99)
GLUCOSE BLDC GLUCOMTR-MCNC: 90 MG/DL (ref 70–99)
GLUCOSE BLDC GLUCOMTR-MCNC: 92 MG/DL (ref 70–99)
GLUCOSE BLDC GLUCOMTR-MCNC: 99 MG/DL (ref 70–99)
GLUCOSE SERPL-MCNC: 126 MG/DL (ref 70–99)
HBA1C MFR BLD: 6.3 % (ref 0–5.6)
HCO3 BLD-SCNC: 24 MMOL/L (ref 21–28)
HCO3 BLD-SCNC: 25 MMOL/L (ref 21–28)
HCO3 BLD-SCNC: 26 MMOL/L (ref 21–28)
HCO3 BLD-SCNC: 28 MMOL/L (ref 21–28)
HCO3 BLD-SCNC: 28 MMOL/L (ref 21–28)
HCO3 BLD-SCNC: 29 MMOL/L (ref 21–28)
HCO3 BLD-SCNC: 30 MMOL/L (ref 21–28)
HCO3 BLD-SCNC: 31 MMOL/L (ref 21–28)
HCO3 BLD-SCNC: 33 MMOL/L (ref 21–28)
HCO3 BLD-SCNC: 33 MMOL/L (ref 21–28)
HCO3 BLD-SCNC: 34 MMOL/L (ref 21–28)
HCO3 BLD-SCNC: 35 MMOL/L (ref 21–28)
HCO3 BLD-SCNC: 35 MMOL/L (ref 21–28)
HCO3 BLD-SCNC: 36 MMOL/L (ref 21–28)
HCO3 BLD-SCNC: 37 MMOL/L (ref 21–28)
HCO3 BLD-SCNC: 38 MMOL/L (ref 21–28)
HCO3 BLD-SCNC: 39 MMOL/L (ref 21–28)
HCO3 BLD-SCNC: 40 MMOL/L (ref 21–28)
HCO3 BLD-SCNC: 41 MMOL/L (ref 21–28)
HCO3 BLD-SCNC: 41 MMOL/L (ref 21–28)
HCO3 BLD-SCNC: 42 MMOL/L (ref 21–28)
HCO3 BLD-SCNC: 43 MMOL/L (ref 21–28)
HCO3 BLD-SCNC: 44 MMOL/L (ref 21–28)
HCO3 BLDV-SCNC: 25 MMOL/L (ref 21–28)
HCT VFR BLD AUTO: 36 % (ref 35–47)
HCT VFR BLD AUTO: 36.6 % (ref 35–47)
HCT VFR BLD AUTO: 36.7 % (ref 35–47)
HCT VFR BLD AUTO: 37.2 % (ref 35–47)
HCT VFR BLD AUTO: 37.2 % (ref 35–47)
HCT VFR BLD AUTO: 37.5 % (ref 35–47)
HCT VFR BLD AUTO: 38.4 % (ref 35–47)
HCT VFR BLD AUTO: 38.4 % (ref 35–47)
HCT VFR BLD AUTO: 38.7 % (ref 35–47)
HCT VFR BLD AUTO: 38.9 % (ref 35–47)
HCT VFR BLD AUTO: 38.9 % (ref 35–47)
HCT VFR BLD AUTO: 39 % (ref 35–47)
HCT VFR BLD AUTO: 39.3 % (ref 35–47)
HCT VFR BLD AUTO: 39.8 % (ref 35–47)
HCT VFR BLD AUTO: 40.6 % (ref 35–47)
HCT VFR BLD AUTO: 42.8 % (ref 35–47)
HCT VFR BLD AUTO: 44.1 % (ref 35–47)
HCT VFR BLD AUTO: 44.3 % (ref 35–47)
HCT VFR BLD AUTO: 44.9 % (ref 35–47)
HCT VFR BLD AUTO: 45.4 % (ref 35–47)
HCT VFR BLD AUTO: 45.8 % (ref 35–47)
HCT VFR BLD AUTO: 51.8 % (ref 35–47)
HGB BLD-MCNC: 10.7 G/DL (ref 11.7–15.7)
HGB BLD-MCNC: 10.9 G/DL (ref 11.7–15.7)
HGB BLD-MCNC: 11 G/DL (ref 11.7–15.7)
HGB BLD-MCNC: 11.1 G/DL (ref 11.7–15.7)
HGB BLD-MCNC: 11.1 G/DL (ref 11.7–15.7)
HGB BLD-MCNC: 11.2 G/DL (ref 11.7–15.7)
HGB BLD-MCNC: 11.7 G/DL (ref 11.7–15.7)
HGB BLD-MCNC: 11.8 G/DL (ref 11.7–15.7)
HGB BLD-MCNC: 12 G/DL (ref 11.7–15.7)
HGB BLD-MCNC: 12 G/DL (ref 11.7–15.7)
HGB BLD-MCNC: 12.2 G/DL (ref 11.7–15.7)
HGB BLD-MCNC: 12.4 G/DL (ref 11.7–15.7)
HGB BLD-MCNC: 12.9 G/DL (ref 11.7–15.7)
HGB BLD-MCNC: 13.5 G/DL (ref 11.7–15.7)
HGB BLD-MCNC: 14.3 G/DL (ref 11.7–15.7)
HGB BLD-MCNC: 14.7 G/DL (ref 11.7–15.7)
HGB BLD-MCNC: 14.8 G/DL (ref 11.7–15.7)
HGB BLD-MCNC: 14.8 G/DL (ref 11.7–15.7)
HGB BLD-MCNC: 14.9 G/DL (ref 11.7–15.7)
HGB BLD-MCNC: 16.1 G/DL (ref 11.7–15.7)
HOLD SPECIMEN: NORMAL
IMM GRANULOCYTES # BLD: 0 10E3/UL
IMM GRANULOCYTES # BLD: 0 10E9/L (ref 0–0.4)
IMM GRANULOCYTES # BLD: 0.1 10E3/UL
IMM GRANULOCYTES NFR BLD: 0.2 %
IMM GRANULOCYTES NFR BLD: 1 %
IMM GRANULOCYTES NFR BLD: 1 %
INR PPP: 0.97 (ref 0.85–1.15)
INTERPRETATION ECG - MUSE: NORMAL
LABORATORY COMMENT REPORT: NORMAL
LACTATE BLD-SCNC: 1.1 MMOL/L
LACTATE SERPL-SCNC: 1.3 MMOL/L (ref 0.7–2)
LDH SERPL L TO P-CCNC: 316 U/L (ref 81–234)
LDH SERPL L TO P-CCNC: 365 U/L (ref 81–234)
LDH SERPL L TO P-CCNC: 376 U/L (ref 81–234)
LDH SERPL L TO P-CCNC: 398 U/L (ref 81–234)
LDH SERPL L TO P-CCNC: 492 U/L (ref 81–234)
LVEF ECHO: NORMAL
LYMPHOCYTES # BLD AUTO: 0.9 10E3/UL (ref 0.8–5.3)
LYMPHOCYTES # BLD AUTO: 1 10E3/UL (ref 0.8–5.3)
LYMPHOCYTES # BLD AUTO: 4 10E9/L (ref 0.8–5.3)
LYMPHOCYTES # BLD MANUAL: 1.3 10E3/UL (ref 0.8–5.3)
LYMPHOCYTES NFR BLD AUTO: 13 %
LYMPHOCYTES NFR BLD AUTO: 13 %
LYMPHOCYTES NFR BLD AUTO: 40.4 %
LYMPHOCYTES NFR BLD MANUAL: 20 %
MAGNESIUM SERPL-MCNC: 2.2 MG/DL (ref 1.6–2.3)
MAGNESIUM SERPL-MCNC: 2.5 MG/DL (ref 1.6–2.3)
MAGNESIUM SERPL-MCNC: 2.5 MG/DL (ref 1.6–2.3)
MAGNESIUM SERPL-MCNC: 2.7 MG/DL (ref 1.6–2.3)
MAGNESIUM SERPL-MCNC: 2.8 MG/DL (ref 1.6–2.3)
MAGNESIUM SERPL-MCNC: 2.9 MG/DL (ref 1.6–2.3)
MAGNESIUM SERPL-MCNC: 2.9 MG/DL (ref 1.6–2.3)
MCH RBC QN AUTO: 28.1 PG (ref 26.5–33)
MCH RBC QN AUTO: 28.3 PG (ref 26.5–33)
MCH RBC QN AUTO: 28.3 PG (ref 26.5–33)
MCH RBC QN AUTO: 28.4 PG (ref 26.5–33)
MCH RBC QN AUTO: 28.6 PG (ref 26.5–33)
MCH RBC QN AUTO: 28.7 PG (ref 26.5–33)
MCH RBC QN AUTO: 28.8 PG (ref 26.5–33)
MCH RBC QN AUTO: 28.8 PG (ref 26.5–33)
MCH RBC QN AUTO: 28.9 PG (ref 26.5–33)
MCH RBC QN AUTO: 29 PG (ref 26.5–33)
MCH RBC QN AUTO: 29.1 PG (ref 26.5–33)
MCH RBC QN AUTO: 29.2 PG (ref 26.5–33)
MCH RBC QN AUTO: 29.2 PG (ref 26.5–33)
MCH RBC QN AUTO: 29.4 PG (ref 26.5–33)
MCH RBC QN AUTO: 29.7 PG (ref 26.5–33)
MCHC RBC AUTO-ENTMCNC: 29.6 G/DL (ref 31.5–36.5)
MCHC RBC AUTO-ENTMCNC: 29.7 G/DL (ref 31.5–36.5)
MCHC RBC AUTO-ENTMCNC: 29.7 G/DL (ref 31.5–36.5)
MCHC RBC AUTO-ENTMCNC: 29.8 G/DL (ref 31.5–36.5)
MCHC RBC AUTO-ENTMCNC: 29.9 G/DL (ref 31.5–36.5)
MCHC RBC AUTO-ENTMCNC: 30 G/DL (ref 31.5–36.5)
MCHC RBC AUTO-ENTMCNC: 30.3 G/DL (ref 31.5–36.5)
MCHC RBC AUTO-ENTMCNC: 30.3 G/DL (ref 31.5–36.5)
MCHC RBC AUTO-ENTMCNC: 30.5 G/DL (ref 31.5–36.5)
MCHC RBC AUTO-ENTMCNC: 30.5 G/DL (ref 31.5–36.5)
MCHC RBC AUTO-ENTMCNC: 30.8 G/DL (ref 31.5–36.5)
MCHC RBC AUTO-ENTMCNC: 31 G/DL (ref 31.5–36.5)
MCHC RBC AUTO-ENTMCNC: 31 G/DL (ref 31.5–36.5)
MCHC RBC AUTO-ENTMCNC: 31.1 G/DL (ref 31.5–36.5)
MCHC RBC AUTO-ENTMCNC: 31.2 G/DL (ref 31.5–36.5)
MCHC RBC AUTO-ENTMCNC: 31.5 G/DL (ref 31.5–36.5)
MCHC RBC AUTO-ENTMCNC: 31.8 G/DL (ref 31.5–36.5)
MCHC RBC AUTO-ENTMCNC: 32.3 G/DL (ref 31.5–36.5)
MCHC RBC AUTO-ENTMCNC: 32.4 G/DL (ref 31.5–36.5)
MCHC RBC AUTO-ENTMCNC: 32.8 G/DL (ref 31.5–36.5)
MCHC RBC AUTO-ENTMCNC: 33 G/DL (ref 31.5–36.5)
MCHC RBC AUTO-ENTMCNC: 33.2 G/DL (ref 31.5–36.5)
MCV RBC AUTO: 100 FL (ref 78–100)
MCV RBC AUTO: 87 FL (ref 78–100)
MCV RBC AUTO: 88 FL (ref 78–100)
MCV RBC AUTO: 89 FL (ref 78–100)
MCV RBC AUTO: 89 FL (ref 78–100)
MCV RBC AUTO: 91 FL (ref 78–100)
MCV RBC AUTO: 92 FL (ref 78–100)
MCV RBC AUTO: 93 FL (ref 78–100)
MCV RBC AUTO: 94 FL (ref 78–100)
MCV RBC AUTO: 94 FL (ref 78–100)
MCV RBC AUTO: 95 FL (ref 78–100)
MCV RBC AUTO: 95 FL (ref 78–100)
MCV RBC AUTO: 96 FL (ref 78–100)
MCV RBC AUTO: 98 FL (ref 78–100)
MONOCYTES # BLD AUTO: 0.5 10E3/UL (ref 0–1.3)
MONOCYTES # BLD AUTO: 0.8 10E3/UL (ref 0–1.3)
MONOCYTES # BLD AUTO: 0.8 10E9/L (ref 0–1.3)
MONOCYTES # BLD MANUAL: 0.9 10E3/UL (ref 0–1.3)
MONOCYTES NFR BLD AUTO: 7 %
MONOCYTES NFR BLD AUTO: 8.4 %
MONOCYTES NFR BLD AUTO: 9 %
MONOCYTES NFR BLD MANUAL: 14 %
NEUTROPHILS # BLD AUTO: 4.8 10E9/L (ref 1.6–8.3)
NEUTROPHILS # BLD AUTO: 6 10E3/UL (ref 1.6–8.3)
NEUTROPHILS # BLD AUTO: 6.1 10E3/UL (ref 1.6–8.3)
NEUTROPHILS # BLD MANUAL: 4.4 10E3/UL (ref 1.6–8.3)
NEUTROPHILS NFR BLD AUTO: 48.4 %
NEUTROPHILS NFR BLD AUTO: 77 %
NEUTROPHILS NFR BLD AUTO: 79 %
NEUTROPHILS NFR BLD MANUAL: 66 %
NRBC # BLD AUTO: 0 10*3/UL
NRBC # BLD AUTO: 0 10E3/UL
NRBC # BLD AUTO: 0 10E3/UL
NRBC BLD AUTO-RTO: 0 /100
O2/TOTAL GAS SETTING VFR VENT: 100 %
O2/TOTAL GAS SETTING VFR VENT: 50 %
O2/TOTAL GAS SETTING VFR VENT: 50 %
O2/TOTAL GAS SETTING VFR VENT: 55 %
O2/TOTAL GAS SETTING VFR VENT: 60 %
O2/TOTAL GAS SETTING VFR VENT: 65 %
O2/TOTAL GAS SETTING VFR VENT: 70 %
O2/TOTAL GAS SETTING VFR VENT: 80 %
O2/TOTAL GAS SETTING VFR VENT: 85 %
O2/TOTAL GAS SETTING VFR VENT: 90 %
O2/TOTAL GAS SETTING VFR VENT: 90 %
O2/TOTAL GAS SETTING VFR VENT: 95 %
OXYHGB MFR BLD: 89 % (ref 92–100)
OXYHGB MFR BLD: 94 % (ref 92–100)
OXYHGB MFR BLD: 95 % (ref 92–100)
OXYHGB MFR BLD: 95 % (ref 92–100)
OXYHGB MFR BLD: 96 % (ref 92–100)
OXYHGB MFR BLD: 96 % (ref 92–100)
OXYHGB MFR BLD: 97 % (ref 92–100)
OXYHGB MFR BLD: 97 % (ref 92–100)
OXYHGB MFR BLD: 98 % (ref 92–100)
OXYHGB MFR BLD: 98 % (ref 92–100)
PCO2 BLD: 34 MM HG (ref 35–45)
PCO2 BLD: 37 MM HG (ref 35–45)
PCO2 BLD: 47 MM HG (ref 35–45)
PCO2 BLD: 49 MM HG (ref 35–45)
PCO2 BLD: 51 MM HG (ref 35–45)
PCO2 BLD: 52 MM HG (ref 35–45)
PCO2 BLD: 54 MM HG (ref 35–45)
PCO2 BLD: 54 MM HG (ref 35–45)
PCO2 BLD: 55 MM HG (ref 35–45)
PCO2 BLD: 55 MM HG (ref 35–45)
PCO2 BLD: 56 MM HG (ref 35–45)
PCO2 BLD: 56 MM HG (ref 35–45)
PCO2 BLD: 57 MM HG (ref 35–45)
PCO2 BLD: 58 MM HG (ref 35–45)
PCO2 BLD: 59 MM HG (ref 35–45)
PCO2 BLD: 59 MM HG (ref 35–45)
PCO2 BLD: 60 MM HG (ref 35–45)
PCO2 BLD: 61 MM HG (ref 35–45)
PCO2 BLD: 62 MM HG (ref 35–45)
PCO2 BLD: 63 MM HG (ref 35–45)
PCO2 BLD: 65 MM HG (ref 35–45)
PCO2 BLDV: 46 MM HG (ref 40–50)
PH BLD: 7.32 [PH] (ref 7.35–7.45)
PH BLD: 7.36 [PH] (ref 7.35–7.45)
PH BLD: 7.36 [PH] (ref 7.35–7.45)
PH BLD: 7.38 [PH] (ref 7.35–7.45)
PH BLD: 7.38 [PH] (ref 7.35–7.45)
PH BLD: 7.39 [PH] (ref 7.35–7.45)
PH BLD: 7.4 [PH] (ref 7.35–7.45)
PH BLD: 7.41 [PH] (ref 7.35–7.45)
PH BLD: 7.42 [PH] (ref 7.35–7.45)
PH BLD: 7.42 [PH] (ref 7.35–7.45)
PH BLD: 7.43 [PH] (ref 7.35–7.45)
PH BLD: 7.44 [PH] (ref 7.35–7.45)
PH BLD: 7.45 [PH] (ref 7.35–7.45)
PH BLD: 7.46 [PH] (ref 7.35–7.45)
PH BLD: 7.47 [PH] (ref 7.35–7.45)
PH BLD: 7.47 [PH] (ref 7.35–7.45)
PH BLD: 7.48 [PH] (ref 7.35–7.45)
PH BLDV: 7.33 [PH] (ref 7.32–7.43)
PHOSPHATE SERPL-MCNC: 2.4 MG/DL (ref 2.5–4.5)
PHOSPHATE SERPL-MCNC: 2.6 MG/DL (ref 2.5–4.5)
PHOSPHATE SERPL-MCNC: 2.8 MG/DL (ref 2.5–4.5)
PHOSPHATE SERPL-MCNC: 2.9 MG/DL (ref 2.5–4.5)
PHOSPHATE SERPL-MCNC: 2.9 MG/DL (ref 2.5–4.5)
PHOSPHATE SERPL-MCNC: 3 MG/DL (ref 2.5–4.5)
PHOSPHATE SERPL-MCNC: 3.1 MG/DL (ref 2.5–4.5)
PHOSPHATE SERPL-MCNC: 3.8 MG/DL (ref 2.5–4.5)
PHOSPHATE SERPL-MCNC: 3.9 MG/DL (ref 2.5–4.5)
PHOSPHATE SERPL-MCNC: 4.1 MG/DL (ref 2.5–4.5)
PHOSPHATE SERPL-MCNC: 4.2 MG/DL (ref 2.5–4.5)
PLAT MORPH BLD: ABNORMAL
PLAT MORPH BLD: NORMAL
PLATELET # BLD AUTO: 141 10E3/UL (ref 150–450)
PLATELET # BLD AUTO: 142 10E3/UL (ref 150–450)
PLATELET # BLD AUTO: 148 10E3/UL (ref 150–450)
PLATELET # BLD AUTO: 156 10E3/UL (ref 150–450)
PLATELET # BLD AUTO: 157 10E3/UL (ref 150–450)
PLATELET # BLD AUTO: 157 10E3/UL (ref 150–450)
PLATELET # BLD AUTO: 165 10E3/UL (ref 150–450)
PLATELET # BLD AUTO: 170 10E3/UL (ref 150–450)
PLATELET # BLD AUTO: 191 10E3/UL (ref 150–450)
PLATELET # BLD AUTO: 199 10E3/UL (ref 150–450)
PLATELET # BLD AUTO: 209 10E3/UL (ref 150–450)
PLATELET # BLD AUTO: 216 10E3/UL (ref 150–450)
PLATELET # BLD AUTO: 219 10E3/UL (ref 150–450)
PLATELET # BLD AUTO: 252 10E3/UL (ref 150–450)
PLATELET # BLD AUTO: 275 10E3/UL (ref 150–450)
PLATELET # BLD AUTO: 281 10E3/UL (ref 150–450)
PLATELET # BLD AUTO: 291 10E9/L (ref 150–450)
PLATELET # BLD AUTO: 306 10E3/UL (ref 150–450)
PLATELET # BLD AUTO: 317 10E3/UL (ref 150–450)
PLATELET # BLD AUTO: 318 10E3/UL (ref 150–450)
PLATELET # BLD AUTO: 324 10E3/UL (ref 150–450)
PLATELET # BLD AUTO: 330 10E3/UL (ref 150–450)
PO2 BLD: 102 MM HG (ref 80–105)
PO2 BLD: 102 MM HG (ref 80–105)
PO2 BLD: 111 MM HG (ref 80–105)
PO2 BLD: 112 MM HG (ref 80–105)
PO2 BLD: 114 MM HG (ref 80–105)
PO2 BLD: 135 MM HG (ref 80–105)
PO2 BLD: 53 MM HG (ref 80–105)
PO2 BLD: 68 MM HG (ref 80–105)
PO2 BLD: 69 MM HG (ref 80–105)
PO2 BLD: 70 MM HG (ref 80–105)
PO2 BLD: 73 MM HG (ref 80–105)
PO2 BLD: 75 MM HG (ref 80–105)
PO2 BLD: 76 MM HG (ref 80–105)
PO2 BLD: 78 MM HG (ref 80–105)
PO2 BLD: 80 MM HG (ref 80–105)
PO2 BLD: 81 MM HG (ref 80–105)
PO2 BLD: 82 MM HG (ref 80–105)
PO2 BLD: 83 MM HG (ref 80–105)
PO2 BLD: 84 MM HG (ref 80–105)
PO2 BLD: 86 MM HG (ref 80–105)
PO2 BLD: 87 MM HG (ref 80–105)
PO2 BLD: 90 MM HG (ref 80–105)
PO2 BLD: 91 MM HG (ref 80–105)
PO2 BLD: 93 MM HG (ref 80–105)
PO2 BLD: 94 MM HG (ref 80–105)
PO2 BLD: 99 MM HG (ref 80–105)
PO2 BLDV: 20 MM HG (ref 25–47)
POTASSIUM BLD-SCNC: 3.6 MMOL/L (ref 3.4–5.3)
POTASSIUM BLD-SCNC: 3.9 MMOL/L (ref 3.4–5.3)
POTASSIUM BLD-SCNC: 4 MMOL/L (ref 3.4–5.3)
POTASSIUM BLD-SCNC: 4.1 MMOL/L (ref 3.4–5.3)
POTASSIUM BLD-SCNC: 4.3 MMOL/L (ref 3.4–5.3)
POTASSIUM BLD-SCNC: 4.4 MMOL/L (ref 3.4–5.3)
POTASSIUM BLD-SCNC: 4.5 MMOL/L (ref 3.4–5.3)
POTASSIUM BLD-SCNC: 4.6 MMOL/L (ref 3.4–5.3)
POTASSIUM BLD-SCNC: 4.7 MMOL/L (ref 3.4–5.3)
POTASSIUM BLD-SCNC: 4.8 MMOL/L (ref 3.4–5.3)
POTASSIUM BLD-SCNC: 4.9 MMOL/L (ref 3.4–5.3)
POTASSIUM SERPL-SCNC: 3.9 MMOL/L (ref 3.4–5.3)
PROCALCITONIN SERPL-MCNC: 0.08 NG/ML
PROCALCITONIN SERPL-MCNC: <0.05 NG/ML
PROCALCITONIN SERPL-MCNC: <0.05 NG/ML
PROT SERPL-MCNC: 5.7 G/DL (ref 6.8–8.8)
PROT SERPL-MCNC: 5.9 G/DL (ref 6.8–8.8)
PROT SERPL-MCNC: 6.1 G/DL (ref 6.8–8.8)
PROT SERPL-MCNC: 6.2 G/DL (ref 6.8–8.8)
PROT SERPL-MCNC: 7.1 G/DL (ref 6.8–8.8)
PROT SERPL-MCNC: 7.6 G/DL (ref 6.8–8.8)
RBC # BLD AUTO: 3.66 10E6/UL (ref 3.8–5.2)
RBC # BLD AUTO: 3.74 10E6/UL (ref 3.8–5.2)
RBC # BLD AUTO: 3.74 10E6/UL (ref 3.8–5.2)
RBC # BLD AUTO: 3.8 10E6/UL (ref 3.8–5.2)
RBC # BLD AUTO: 3.81 10E6/UL (ref 3.8–5.2)
RBC # BLD AUTO: 3.81 10E6/UL (ref 3.8–5.2)
RBC # BLD AUTO: 4.07 10E6/UL (ref 3.8–5.2)
RBC # BLD AUTO: 4.08 10E6/UL (ref 3.8–5.2)
RBC # BLD AUTO: 4.12 10E6/UL (ref 3.8–5.2)
RBC # BLD AUTO: 4.13 10E6/UL (ref 3.8–5.2)
RBC # BLD AUTO: 4.13 10E6/UL (ref 3.8–5.2)
RBC # BLD AUTO: 4.19 10E6/UL (ref 3.8–5.2)
RBC # BLD AUTO: 4.26 10E6/UL (ref 3.8–5.2)
RBC # BLD AUTO: 4.27 10E6/UL (ref 3.8–5.2)
RBC # BLD AUTO: 4.48 10E6/UL (ref 3.8–5.2)
RBC # BLD AUTO: 4.8 10E6/UL (ref 3.8–5.2)
RBC # BLD AUTO: 5.06 10E12/L (ref 3.8–5.2)
RBC # BLD AUTO: 5.06 10E6/UL (ref 3.8–5.2)
RBC # BLD AUTO: 5.12 10E6/UL (ref 3.8–5.2)
RBC # BLD AUTO: 5.17 10E6/UL (ref 3.8–5.2)
RBC # BLD AUTO: 5.17 10E6/UL (ref 3.8–5.2)
RBC # BLD AUTO: 5.56 10E6/UL (ref 3.8–5.2)
RBC MORPH BLD: ABNORMAL
RBC MORPH BLD: NORMAL
RSV RNA SPEC QL NAA+PROBE: NORMAL
SAO2 % BLDV: 28 % (ref 94–100)
SARS-COV-2 RNA RESP QL NAA+PROBE: NEGATIVE
SARS-COV-2 RNA RESP QL NAA+PROBE: POSITIVE
SODIUM SERPL-SCNC: 132 MMOL/L (ref 133–144)
SODIUM SERPL-SCNC: 134 MMOL/L (ref 133–144)
SODIUM SERPL-SCNC: 135 MMOL/L (ref 133–144)
SODIUM SERPL-SCNC: 140 MMOL/L (ref 133–144)
SODIUM SERPL-SCNC: 143 MMOL/L (ref 133–144)
SODIUM SERPL-SCNC: 144 MMOL/L (ref 133–144)
SODIUM SERPL-SCNC: 145 MMOL/L (ref 133–144)
SODIUM SERPL-SCNC: 146 MMOL/L (ref 133–144)
SODIUM SERPL-SCNC: 147 MMOL/L (ref 133–144)
SODIUM SERPL-SCNC: 148 MMOL/L (ref 133–144)
SPECIMEN EXPIRATION DATE: NORMAL
SPECIMEN SOURCE: NORMAL
T4 FREE SERPL-MCNC: 1.01 NG/DL (ref 0.76–1.46)
TROPONIN I SERPL HS-MCNC: 20 NG/L
TROPONIN I SERPL-MCNC: <0.015 UG/L (ref 0–0.04)
TROPONIN I SERPL-MCNC: <0.015 UG/L (ref 0–0.04)
TSH SERPL DL<=0.005 MIU/L-ACNC: 0.2 MU/L (ref 0.4–4)
WBC # BLD AUTO: 10.5 10E3/UL (ref 4–11)
WBC # BLD AUTO: 10.7 10E3/UL (ref 4–11)
WBC # BLD AUTO: 11 10E3/UL (ref 4–11)
WBC # BLD AUTO: 11.1 10E3/UL (ref 4–11)
WBC # BLD AUTO: 11.4 10E3/UL (ref 4–11)
WBC # BLD AUTO: 11.8 10E3/UL (ref 4–11)
WBC # BLD AUTO: 15.6 10E3/UL (ref 4–11)
WBC # BLD AUTO: 3.8 10E3/UL (ref 4–11)
WBC # BLD AUTO: 5.9 10E3/UL (ref 4–11)
WBC # BLD AUTO: 6.4 10E3/UL (ref 4–11)
WBC # BLD AUTO: 6.6 10E3/UL (ref 4–11)
WBC # BLD AUTO: 6.9 10E3/UL (ref 4–11)
WBC # BLD AUTO: 7.2 10E3/UL (ref 4–11)
WBC # BLD AUTO: 7.5 10E3/UL (ref 4–11)
WBC # BLD AUTO: 8 10E3/UL (ref 4–11)
WBC # BLD AUTO: 8.1 10E3/UL (ref 4–11)
WBC # BLD AUTO: 8.1 10E3/UL (ref 4–11)
WBC # BLD AUTO: 8.8 10E3/UL (ref 4–11)
WBC # BLD AUTO: 9 10E3/UL (ref 4–11)
WBC # BLD AUTO: 9 10E3/UL (ref 4–11)
WBC # BLD AUTO: 9.8 10E9/L (ref 4–11)
WBC # BLD AUTO: 9.9 10E3/UL (ref 4–11)

## 2021-01-01 PROCEDURE — 83735 ASSAY OF MAGNESIUM: CPT | Performed by: SURGERY

## 2021-01-01 PROCEDURE — 200N000001 HC R&B ICU

## 2021-01-01 PROCEDURE — 250N000009 HC RX 250

## 2021-01-01 PROCEDURE — 94640 AIRWAY INHALATION TREATMENT: CPT | Mod: 76

## 2021-01-01 PROCEDURE — 85384 FIBRINOGEN ACTIVITY: CPT | Performed by: HOSPITALIST

## 2021-01-01 PROCEDURE — 250N000011 HC RX IP 250 OP 636: Performed by: SURGERY

## 2021-01-01 PROCEDURE — 86140 C-REACTIVE PROTEIN: CPT | Performed by: HOSPITALIST

## 2021-01-01 PROCEDURE — 86140 C-REACTIVE PROTEIN: CPT | Performed by: EMERGENCY MEDICINE

## 2021-01-01 PROCEDURE — 250N000009 HC RX 250: Performed by: STUDENT IN AN ORGANIZED HEALTH CARE EDUCATION/TRAINING PROGRAM

## 2021-01-01 PROCEDURE — 82803 BLOOD GASES ANY COMBINATION: CPT | Performed by: NURSE PRACTITIONER

## 2021-01-01 PROCEDURE — 999N000157 HC STATISTIC RCP TIME EA 10 MIN

## 2021-01-01 PROCEDURE — 250N000013 HC RX MED GY IP 250 OP 250 PS 637: Performed by: SURGERY

## 2021-01-01 PROCEDURE — 250N000009 HC RX 250: Performed by: SURGERY

## 2021-01-01 PROCEDURE — 250N000013 HC RX MED GY IP 250 OP 250 PS 637: Performed by: HOSPITALIST

## 2021-01-01 PROCEDURE — 258N000003 HC RX IP 258 OP 636: Performed by: STUDENT IN AN ORGANIZED HEALTH CARE EDUCATION/TRAINING PROGRAM

## 2021-01-01 PROCEDURE — 84145 PROCALCITONIN (PCT): CPT | Performed by: HOSPITALIST

## 2021-01-01 PROCEDURE — 82805 BLOOD GASES W/O2 SATURATION: CPT | Performed by: HOSPITALIST

## 2021-01-01 PROCEDURE — 82310 ASSAY OF CALCIUM: CPT | Performed by: SURGERY

## 2021-01-01 PROCEDURE — 250N000009 HC RX 250: Performed by: HOSPITALIST

## 2021-01-01 PROCEDURE — 250N000011 HC RX IP 250 OP 636: Performed by: STUDENT IN AN ORGANIZED HEALTH CARE EDUCATION/TRAINING PROGRAM

## 2021-01-01 PROCEDURE — 84100 ASSAY OF PHOSPHORUS: CPT | Performed by: SURGERY

## 2021-01-01 PROCEDURE — 99233 SBSQ HOSP IP/OBS HIGH 50: CPT | Performed by: HOSPITALIST

## 2021-01-01 PROCEDURE — 94660 CPAP INITIATION&MGMT: CPT

## 2021-01-01 PROCEDURE — 99291 CRITICAL CARE FIRST HOUR: CPT | Mod: GC | Performed by: INTERNAL MEDICINE

## 2021-01-01 PROCEDURE — 85379 FIBRIN DEGRADATION QUANT: CPT | Performed by: HOSPITALIST

## 2021-01-01 PROCEDURE — 250N000011 HC RX IP 250 OP 636: Performed by: HOSPITALIST

## 2021-01-01 PROCEDURE — 36415 COLL VENOUS BLD VENIPUNCTURE: CPT | Performed by: EMERGENCY MEDICINE

## 2021-01-01 PROCEDURE — 71275 CT ANGIOGRAPHY CHEST: CPT | Mod: ME

## 2021-01-01 PROCEDURE — 71045 X-RAY EXAM CHEST 1 VIEW: CPT

## 2021-01-01 PROCEDURE — 84295 ASSAY OF SERUM SODIUM: CPT | Performed by: INTERNAL MEDICINE

## 2021-01-01 PROCEDURE — 93306 TTE W/DOPPLER COMPLETE: CPT | Mod: 26 | Performed by: INTERNAL MEDICINE

## 2021-01-01 PROCEDURE — 85014 HEMATOCRIT: CPT | Performed by: HOSPITALIST

## 2021-01-01 PROCEDURE — 84100 ASSAY OF PHOSPHORUS: CPT | Performed by: HOSPITALIST

## 2021-01-01 PROCEDURE — 999N000009 HC STATISTIC AIRWAY CARE

## 2021-01-01 PROCEDURE — 84100 ASSAY OF PHOSPHORUS: CPT | Performed by: INTERNAL MEDICINE

## 2021-01-01 PROCEDURE — 250N000009 HC RX 250: Performed by: INTERNAL MEDICINE

## 2021-01-01 PROCEDURE — 85025 COMPLETE CBC W/AUTO DIFF WBC: CPT | Performed by: INTERNAL MEDICINE

## 2021-01-01 PROCEDURE — 999N000043 HC STATISTIC CTO2 CONT OXYGEN TECH TIME EA 90 MIN

## 2021-01-01 PROCEDURE — 120N000001 HC R&B MED SURG/OB

## 2021-01-01 PROCEDURE — 94003 VENT MGMT INPAT SUBQ DAY: CPT

## 2021-01-01 PROCEDURE — 36415 COLL VENOUS BLD VENIPUNCTURE: CPT | Performed by: HOSPITALIST

## 2021-01-01 PROCEDURE — 87040 BLOOD CULTURE FOR BACTERIA: CPT | Performed by: SURGERY

## 2021-01-01 PROCEDURE — 272N000272 HC CONTINUOUS NEBULIZER MICRO PUMP

## 2021-01-01 PROCEDURE — 83036 HEMOGLOBIN GLYCOSYLATED A1C: CPT | Performed by: HOSPITALIST

## 2021-01-01 PROCEDURE — 99291 CRITICAL CARE FIRST HOUR: CPT | Performed by: INTERNAL MEDICINE

## 2021-01-01 PROCEDURE — 258N000003 HC RX IP 258 OP 636: Performed by: HOSPITALIST

## 2021-01-01 PROCEDURE — 96375 TX/PRO/DX INJ NEW DRUG ADDON: CPT

## 2021-01-01 PROCEDURE — 82803 BLOOD GASES ANY COMBINATION: CPT

## 2021-01-01 PROCEDURE — 83615 LACTATE (LD) (LDH) ENZYME: CPT | Performed by: HOSPITALIST

## 2021-01-01 PROCEDURE — C9803 HOPD COVID-19 SPEC COLLECT: HCPCS

## 2021-01-01 PROCEDURE — 80048 BASIC METABOLIC PNL TOTAL CA: CPT | Performed by: SURGERY

## 2021-01-01 PROCEDURE — 85610 PROTHROMBIN TIME: CPT | Performed by: HOSPITALIST

## 2021-01-01 PROCEDURE — 94645 CONT INHLJ TX EACH ADDL HOUR: CPT

## 2021-01-01 PROCEDURE — 250N000013 HC RX MED GY IP 250 OP 250 PS 637: Performed by: INTERNAL MEDICINE

## 2021-01-01 PROCEDURE — 31500 INSERT EMERGENCY AIRWAY: CPT | Mod: GC | Performed by: STUDENT IN AN ORGANIZED HEALTH CARE EDUCATION/TRAINING PROGRAM

## 2021-01-01 PROCEDURE — 84439 ASSAY OF FREE THYROXINE: CPT | Performed by: SURGERY

## 2021-01-01 PROCEDURE — 82805 BLOOD GASES W/O2 SATURATION: CPT | Performed by: NURSE PRACTITIONER

## 2021-01-01 PROCEDURE — 87636 SARSCOV2 & INF A&B AMP PRB: CPT | Performed by: EMERGENCY MEDICINE

## 2021-01-01 PROCEDURE — 80048 BASIC METABOLIC PNL TOTAL CA: CPT | Performed by: EMERGENCY MEDICINE

## 2021-01-01 PROCEDURE — 85027 COMPLETE CBC AUTOMATED: CPT | Performed by: HOSPITALIST

## 2021-01-01 PROCEDURE — 258N000003 HC RX IP 258 OP 636: Performed by: SURGERY

## 2021-01-01 PROCEDURE — 36592 COLLECT BLOOD FROM PICC: CPT | Performed by: SURGERY

## 2021-01-01 PROCEDURE — 3E043XZ INTRODUCTION OF VASOPRESSOR INTO CENTRAL VEIN, PERCUTANEOUS APPROACH: ICD-10-PCS | Performed by: STUDENT IN AN ORGANIZED HEALTH CARE EDUCATION/TRAINING PROGRAM

## 2021-01-01 PROCEDURE — 94640 AIRWAY INHALATION TREATMENT: CPT

## 2021-01-01 PROCEDURE — 250N000011 HC RX IP 250 OP 636: Performed by: EMERGENCY MEDICINE

## 2021-01-01 PROCEDURE — 83605 ASSAY OF LACTIC ACID: CPT

## 2021-01-01 PROCEDURE — 80053 COMPREHEN METABOLIC PANEL: CPT | Performed by: EMERGENCY MEDICINE

## 2021-01-01 PROCEDURE — 250N000011 HC RX IP 250 OP 636: Performed by: INTERNAL MEDICINE

## 2021-01-01 PROCEDURE — 82803 BLOOD GASES ANY COMBINATION: CPT | Performed by: SURGERY

## 2021-01-01 PROCEDURE — 84100 ASSAY OF PHOSPHORUS: CPT | Performed by: EMERGENCY MEDICINE

## 2021-01-01 PROCEDURE — 93306 TTE W/DOPPLER COMPLETE: CPT

## 2021-01-01 PROCEDURE — 82728 ASSAY OF FERRITIN: CPT | Performed by: EMERGENCY MEDICINE

## 2021-01-01 PROCEDURE — 93005 ELECTROCARDIOGRAM TRACING: CPT

## 2021-01-01 PROCEDURE — 94002 VENT MGMT INPAT INIT DAY: CPT

## 2021-01-01 PROCEDURE — 84145 PROCALCITONIN (PCT): CPT | Performed by: EMERGENCY MEDICINE

## 2021-01-01 PROCEDURE — 85018 HEMOGLOBIN: CPT | Performed by: HOSPITALIST

## 2021-01-01 PROCEDURE — 250N000013 HC RX MED GY IP 250 OP 250 PS 637: Performed by: STUDENT IN AN ORGANIZED HEALTH CARE EDUCATION/TRAINING PROGRAM

## 2021-01-01 PROCEDURE — 84100 ASSAY OF PHOSPHORUS: CPT | Performed by: STUDENT IN AN ORGANIZED HEALTH CARE EDUCATION/TRAINING PROGRAM

## 2021-01-01 PROCEDURE — 5A1955Z RESPIRATORY VENTILATION, GREATER THAN 96 CONSECUTIVE HOURS: ICD-10-PCS | Performed by: STUDENT IN AN ORGANIZED HEALTH CARE EDUCATION/TRAINING PROGRAM

## 2021-01-01 PROCEDURE — 258N000003 HC RX IP 258 OP 636

## 2021-01-01 PROCEDURE — 82310 ASSAY OF CALCIUM: CPT | Performed by: HOSPITALIST

## 2021-01-01 PROCEDURE — 250N000011 HC RX IP 250 OP 636

## 2021-01-01 PROCEDURE — 84443 ASSAY THYROID STIM HORMONE: CPT | Performed by: SURGERY

## 2021-01-01 PROCEDURE — 99291 CRITICAL CARE FIRST HOUR: CPT | Performed by: STUDENT IN AN ORGANIZED HEALTH CARE EDUCATION/TRAINING PROGRAM

## 2021-01-01 PROCEDURE — 85730 THROMBOPLASTIN TIME PARTIAL: CPT | Performed by: HOSPITALIST

## 2021-01-01 PROCEDURE — 36600 WITHDRAWAL OF ARTERIAL BLOOD: CPT

## 2021-01-01 PROCEDURE — 96365 THER/PROPH/DIAG IV INF INIT: CPT | Mod: 59

## 2021-01-01 PROCEDURE — 82805 BLOOD GASES W/O2 SATURATION: CPT | Performed by: SURGERY

## 2021-01-01 PROCEDURE — 87040 BLOOD CULTURE FOR BACTERIA: CPT | Performed by: EMERGENCY MEDICINE

## 2021-01-01 PROCEDURE — 250N000009 HC RX 250: Performed by: EMERGENCY MEDICINE

## 2021-01-01 PROCEDURE — 99223 1ST HOSP IP/OBS HIGH 75: CPT | Mod: AI | Performed by: HOSPITALIST

## 2021-01-01 PROCEDURE — 85027 COMPLETE CBC AUTOMATED: CPT | Performed by: EMERGENCY MEDICINE

## 2021-01-01 PROCEDURE — 82803 BLOOD GASES ANY COMBINATION: CPT | Performed by: HOSPITALIST

## 2021-01-01 PROCEDURE — 85379 FIBRIN DEGRADATION QUANT: CPT | Performed by: EMERGENCY MEDICINE

## 2021-01-01 PROCEDURE — 999N000065 XR ABDOMEN PORT 1 VIEWS

## 2021-01-01 PROCEDURE — G1004 CDSM NDSC: HCPCS

## 2021-01-01 PROCEDURE — XW033H5 INTRODUCTION OF TOCILIZUMAB INTO PERIPHERAL VEIN, PERCUTANEOUS APPROACH, NEW TECHNOLOGY GROUP 5: ICD-10-PCS | Performed by: STUDENT IN AN ORGANIZED HEALTH CARE EDUCATION/TRAINING PROGRAM

## 2021-01-01 PROCEDURE — 80053 COMPREHEN METABOLIC PANEL: CPT | Performed by: HOSPITALIST

## 2021-01-01 PROCEDURE — 250N000012 HC RX MED GY IP 250 OP 636 PS 637: Performed by: SURGERY

## 2021-01-01 PROCEDURE — 82248 BILIRUBIN DIRECT: CPT | Performed by: INTERNAL MEDICINE

## 2021-01-01 PROCEDURE — 80053 COMPREHEN METABOLIC PANEL: CPT | Performed by: INTERNAL MEDICINE

## 2021-01-01 PROCEDURE — 82248 BILIRUBIN DIRECT: CPT | Performed by: SURGERY

## 2021-01-01 PROCEDURE — 99285 EMERGENCY DEPT VISIT HI MDM: CPT | Mod: 25

## 2021-01-01 PROCEDURE — 84484 ASSAY OF TROPONIN QUANT: CPT | Performed by: EMERGENCY MEDICINE

## 2021-01-01 PROCEDURE — 36415 COLL VENOUS BLD VENIPUNCTURE: CPT | Performed by: SURGERY

## 2021-01-01 PROCEDURE — 85049 AUTOMATED PLATELET COUNT: CPT | Performed by: HOSPITALIST

## 2021-01-01 PROCEDURE — 250N000013 HC RX MED GY IP 250 OP 250 PS 637: Performed by: EMERGENCY MEDICINE

## 2021-01-01 PROCEDURE — 86901 BLOOD TYPING SEROLOGIC RH(D): CPT | Performed by: HOSPITALIST

## 2021-01-01 PROCEDURE — 83735 ASSAY OF MAGNESIUM: CPT | Performed by: INTERNAL MEDICINE

## 2021-01-01 PROCEDURE — 36556 INSERT NON-TUNNEL CV CATH: CPT | Mod: GC | Performed by: STUDENT IN AN ORGANIZED HEALTH CARE EDUCATION/TRAINING PROGRAM

## 2021-01-01 PROCEDURE — 258N000003 HC RX IP 258 OP 636: Performed by: INTERNAL MEDICINE

## 2021-01-01 PROCEDURE — 84132 ASSAY OF SERUM POTASSIUM: CPT | Performed by: INTERNAL MEDICINE

## 2021-01-01 PROCEDURE — 250N000012 HC RX MED GY IP 250 OP 636 PS 637: Performed by: HOSPITALIST

## 2021-01-01 PROCEDURE — 250N000011 HC RX IP 250 OP 636: Performed by: ANESTHESIOLOGY

## 2021-01-01 PROCEDURE — 99291 CRITICAL CARE FIRST HOUR: CPT | Mod: 25 | Performed by: STUDENT IN AN ORGANIZED HEALTH CARE EDUCATION/TRAINING PROGRAM

## 2021-01-01 PROCEDURE — 85025 COMPLETE CBC W/AUTO DIFF WBC: CPT | Performed by: EMERGENCY MEDICINE

## 2021-01-01 PROCEDURE — 5A09457 ASSISTANCE WITH RESPIRATORY VENTILATION, 24-96 CONSECUTIVE HOURS, CONTINUOUS POSITIVE AIRWAY PRESSURE: ICD-10-PCS | Performed by: NURSE PRACTITIONER

## 2021-01-01 PROCEDURE — C9113 INJ PANTOPRAZOLE SODIUM, VIA: HCPCS | Performed by: SURGERY

## 2021-01-01 PROCEDURE — 74018 RADEX ABDOMEN 1 VIEW: CPT

## 2021-01-01 PROCEDURE — 82248 BILIRUBIN DIRECT: CPT | Performed by: HOSPITALIST

## 2021-01-01 PROCEDURE — 999N000065 XR CHEST PORT 1 VIEW

## 2021-01-01 PROCEDURE — 85027 COMPLETE CBC AUTOMATED: CPT | Performed by: SURGERY

## 2021-01-01 PROCEDURE — 83735 ASSAY OF MAGNESIUM: CPT | Performed by: HOSPITALIST

## 2021-01-01 PROCEDURE — 82805 BLOOD GASES W/O2 SATURATION: CPT | Performed by: INTERNAL MEDICINE

## 2021-01-01 PROCEDURE — 83605 ASSAY OF LACTIC ACID: CPT | Performed by: HOSPITALIST

## 2021-01-01 PROCEDURE — 250N000012 HC RX MED GY IP 250 OP 636 PS 637: Performed by: INTERNAL MEDICINE

## 2021-01-01 PROCEDURE — 99291 CRITICAL CARE FIRST HOUR: CPT | Performed by: ANESTHESIOLOGY

## 2021-01-01 PROCEDURE — 99291 CRITICAL CARE FIRST HOUR: CPT

## 2021-01-01 PROCEDURE — 36620 INSERTION CATHETER ARTERY: CPT | Mod: GC | Performed by: STUDENT IN AN ORGANIZED HEALTH CARE EDUCATION/TRAINING PROGRAM

## 2021-01-01 PROCEDURE — XW033E5 INTRODUCTION OF REMDESIVIR ANTI-INFECTIVE INTO PERIPHERAL VEIN, PERCUTANEOUS APPROACH, NEW TECHNOLOGY GROUP 5: ICD-10-PCS | Performed by: HOSPITALIST

## 2021-01-01 PROCEDURE — 82803 BLOOD GASES ANY COMBINATION: CPT | Performed by: INTERNAL MEDICINE

## 2021-01-01 PROCEDURE — 83615 LACTATE (LD) (LDH) ENZYME: CPT | Performed by: EMERGENCY MEDICINE

## 2021-01-01 PROCEDURE — 83735 ASSAY OF MAGNESIUM: CPT | Performed by: EMERGENCY MEDICINE

## 2021-01-01 PROCEDURE — 74018 RADEX ABDOMEN 1 VIEW: CPT | Mod: 76

## 2021-01-01 PROCEDURE — 36592 COLLECT BLOOD FROM PICC: CPT | Performed by: HOSPITALIST

## 2021-01-01 PROCEDURE — 82805 BLOOD GASES W/O2 SATURATION: CPT | Performed by: ANESTHESIOLOGY

## 2021-01-01 PROCEDURE — 80048 BASIC METABOLIC PNL TOTAL CA: CPT | Performed by: HOSPITALIST

## 2021-01-01 RX ORDER — MIDAZOLAM HCL IN 0.9 % NACL/PF 1 MG/ML
1-8 PLASTIC BAG, INJECTION (ML) INTRAVENOUS CONTINUOUS
Status: DISCONTINUED | OUTPATIENT
Start: 2021-01-01 | End: 2021-01-01 | Stop reason: HOSPADM

## 2021-01-01 RX ORDER — ALBUTEROL SULFATE 0.83 MG/ML
SOLUTION RESPIRATORY (INHALATION)
Status: COMPLETED
Start: 2021-01-01 | End: 2021-01-01

## 2021-01-01 RX ORDER — CEFTRIAXONE 2 G/1
2 INJECTION, POWDER, FOR SOLUTION INTRAMUSCULAR; INTRAVENOUS ONCE
Status: COMPLETED | OUTPATIENT
Start: 2021-01-01 | End: 2021-01-01

## 2021-01-01 RX ORDER — PROCHLORPERAZINE MALEATE 5 MG
5 TABLET ORAL EVERY 6 HOURS PRN
Status: DISCONTINUED | OUTPATIENT
Start: 2021-01-01 | End: 2021-01-01 | Stop reason: HOSPADM

## 2021-01-01 RX ORDER — DEXAMETHASONE SODIUM PHOSPHATE 10 MG/ML
6 INJECTION, SOLUTION INTRAMUSCULAR; INTRAVENOUS ONCE
Status: COMPLETED | OUTPATIENT
Start: 2021-01-01 | End: 2021-01-01

## 2021-01-01 RX ORDER — DEXTROSE MONOHYDRATE 25 G/50ML
25-50 INJECTION, SOLUTION INTRAVENOUS
Status: DISCONTINUED | OUTPATIENT
Start: 2021-01-01 | End: 2021-01-01 | Stop reason: HOSPADM

## 2021-01-01 RX ORDER — DEXAMETHASONE SODIUM PHOSPHATE 10 MG/ML
12 INJECTION, SOLUTION INTRAMUSCULAR; INTRAVENOUS EVERY 24 HOURS
Status: COMPLETED | OUTPATIENT
Start: 2021-01-01 | End: 2021-01-01

## 2021-01-01 RX ORDER — ACETAMINOPHEN 650 MG/1
650 SUPPOSITORY RECTAL EVERY 6 HOURS PRN
Status: DISCONTINUED | OUTPATIENT
Start: 2021-01-01 | End: 2021-01-01 | Stop reason: HOSPADM

## 2021-01-01 RX ORDER — PROCHLORPERAZINE 25 MG
12.5 SUPPOSITORY, RECTAL RECTAL EVERY 12 HOURS PRN
Status: DISCONTINUED | OUTPATIENT
Start: 2021-01-01 | End: 2021-01-01 | Stop reason: HOSPADM

## 2021-01-01 RX ORDER — LIDOCAINE 40 MG/G
CREAM TOPICAL
Status: DISCONTINUED | OUTPATIENT
Start: 2021-01-01 | End: 2021-01-01 | Stop reason: HOSPADM

## 2021-01-01 RX ORDER — GUAIFENESIN 600 MG/1
15 TABLET, EXTENDED RELEASE ORAL DAILY
Status: DISCONTINUED | OUTPATIENT
Start: 2021-01-01 | End: 2021-01-01 | Stop reason: CLARIF

## 2021-01-01 RX ORDER — FUROSEMIDE 10 MG/ML
40 INJECTION INTRAMUSCULAR; INTRAVENOUS ONCE
Status: COMPLETED | OUTPATIENT
Start: 2021-01-01 | End: 2021-01-01

## 2021-01-01 RX ORDER — ALBUTEROL SULFATE 0.83 MG/ML
2.5 SOLUTION RESPIRATORY (INHALATION) EVERY 4 HOURS PRN
Status: DISCONTINUED | OUTPATIENT
Start: 2021-01-01 | End: 2021-01-01 | Stop reason: HOSPADM

## 2021-01-01 RX ORDER — DEXAMETHASONE 2 MG/1
6 TABLET ORAL DAILY
Status: DISCONTINUED | OUTPATIENT
Start: 2021-01-01 | End: 2021-01-01

## 2021-01-01 RX ORDER — ACETAMINOPHEN 325 MG/1
650 TABLET ORAL EVERY 6 HOURS PRN
Status: DISCONTINUED | OUTPATIENT
Start: 2021-01-01 | End: 2021-01-01 | Stop reason: HOSPADM

## 2021-01-01 RX ORDER — CARBOXYMETHYLCELLULOSE SODIUM 5 MG/ML
1 SOLUTION/ DROPS OPHTHALMIC
Status: DISCONTINUED | OUTPATIENT
Start: 2021-01-01 | End: 2021-01-01 | Stop reason: HOSPADM

## 2021-01-01 RX ORDER — AMINO AC/PROTEIN HYDR/WHEY PRO 10G-100/30
2 LIQUID (ML) ORAL DAILY
Status: DISCONTINUED | OUTPATIENT
Start: 2021-01-01 | End: 2021-01-01 | Stop reason: CLARIF

## 2021-01-01 RX ORDER — IOPAMIDOL 755 MG/ML
75 INJECTION, SOLUTION INTRAVASCULAR ONCE
Status: COMPLETED | OUTPATIENT
Start: 2021-01-01 | End: 2021-01-01

## 2021-01-01 RX ORDER — LEVOTHYROXINE SODIUM 112 UG/1
112 TABLET ORAL DAILY
Status: DISCONTINUED | OUTPATIENT
Start: 2021-01-01 | End: 2021-01-01

## 2021-01-01 RX ORDER — CEFTRIAXONE 2 G/1
2 INJECTION, POWDER, FOR SOLUTION INTRAMUSCULAR; INTRAVENOUS EVERY 24 HOURS
Status: COMPLETED | OUTPATIENT
Start: 2021-01-01 | End: 2021-01-01

## 2021-01-01 RX ORDER — FUROSEMIDE 10 MG/ML
40 INJECTION INTRAMUSCULAR; INTRAVENOUS EVERY 12 HOURS
Status: COMPLETED | OUTPATIENT
Start: 2021-01-01 | End: 2021-01-01

## 2021-01-01 RX ORDER — ACETAMINOPHEN 325 MG/10.15ML
650 LIQUID ORAL EVERY 6 HOURS PRN
Status: DISCONTINUED | OUTPATIENT
Start: 2021-01-01 | End: 2021-01-01 | Stop reason: HOSPADM

## 2021-01-01 RX ORDER — NICOTINE POLACRILEX 4 MG
15-30 LOZENGE BUCCAL
Status: DISCONTINUED | OUTPATIENT
Start: 2021-01-01 | End: 2021-01-01

## 2021-01-01 RX ORDER — LEVOTHYROXINE SODIUM 20 UG/ML
84 INJECTION, SOLUTION INTRAVENOUS DAILY
Status: DISCONTINUED | OUTPATIENT
Start: 2021-01-01 | End: 2021-01-01

## 2021-01-01 RX ORDER — ETOMIDATE 2 MG/ML
20 INJECTION INTRAVENOUS ONCE
Status: COMPLETED | OUTPATIENT
Start: 2021-01-01 | End: 2021-01-01

## 2021-01-01 RX ORDER — LEVOTHYROXINE SODIUM 112 UG/1
112 TABLET ORAL DAILY
Status: DISCONTINUED | OUTPATIENT
Start: 2021-01-01 | End: 2021-01-01 | Stop reason: HOSPADM

## 2021-01-01 RX ORDER — SODIUM CHLORIDE 9 MG/ML
INJECTION, SOLUTION INTRAVENOUS CONTINUOUS
Status: ACTIVE | OUTPATIENT
Start: 2021-01-01 | End: 2021-01-01

## 2021-01-01 RX ORDER — LIDOCAINE 40 MG/G
CREAM TOPICAL
Status: DISCONTINUED | OUTPATIENT
Start: 2021-01-01 | End: 2021-01-01

## 2021-01-01 RX ORDER — NOREPINEPHRINE BITARTRATE 0.02 MG/ML
.01-.6 INJECTION, SOLUTION INTRAVENOUS CONTINUOUS
Status: DISCONTINUED | OUTPATIENT
Start: 2021-01-01 | End: 2021-01-01 | Stop reason: HOSPADM

## 2021-01-01 RX ORDER — PROPOFOL 10 MG/ML
5-75 INJECTION, EMULSION INTRAVENOUS CONTINUOUS
Status: DISCONTINUED | OUTPATIENT
Start: 2021-01-01 | End: 2021-01-01

## 2021-01-01 RX ORDER — ALBUTEROL SULFATE 90 UG/1
2 AEROSOL, METERED RESPIRATORY (INHALATION) EVERY 4 HOURS PRN
Status: DISCONTINUED | OUTPATIENT
Start: 2021-01-01 | End: 2021-01-01

## 2021-01-01 RX ORDER — PROPOFOL 10 MG/ML
INJECTION, EMULSION INTRAVENOUS
Status: COMPLETED
Start: 2021-01-01 | End: 2021-01-01

## 2021-01-01 RX ORDER — SODIUM CHLORIDE 9 MG/ML
INJECTION, SOLUTION INTRAVENOUS CONTINUOUS
Status: DISCONTINUED | OUTPATIENT
Start: 2021-01-01 | End: 2021-01-01 | Stop reason: HOSPADM

## 2021-01-01 RX ORDER — NALOXONE HYDROCHLORIDE 0.4 MG/ML
0.2 INJECTION, SOLUTION INTRAMUSCULAR; INTRAVENOUS; SUBCUTANEOUS
Status: DISCONTINUED | OUTPATIENT
Start: 2021-01-01 | End: 2021-01-01 | Stop reason: HOSPADM

## 2021-01-01 RX ORDER — DEXTROSE MONOHYDRATE 100 MG/ML
INJECTION, SOLUTION INTRAVENOUS CONTINUOUS PRN
Status: DISCONTINUED | OUTPATIENT
Start: 2021-01-01 | End: 2021-01-01 | Stop reason: HOSPADM

## 2021-01-01 RX ORDER — NOREPINEPHRINE BITARTRATE 0.02 MG/ML
INJECTION, SOLUTION INTRAVENOUS
Status: COMPLETED
Start: 2021-01-01 | End: 2021-01-01

## 2021-01-01 RX ORDER — AZITHROMYCIN 500 MG/1
500 INJECTION, POWDER, LYOPHILIZED, FOR SOLUTION INTRAVENOUS ONCE
Status: COMPLETED | OUTPATIENT
Start: 2021-01-01 | End: 2021-01-01

## 2021-01-01 RX ORDER — ACETAZOLAMIDE 500 MG/5ML
500 INJECTION, POWDER, LYOPHILIZED, FOR SOLUTION INTRAVENOUS ONCE
Status: COMPLETED | OUTPATIENT
Start: 2021-01-01 | End: 2021-01-01

## 2021-01-01 RX ORDER — LEVOTHYROXINE SODIUM 112 UG/1
112 TABLET ORAL DAILY
COMMUNITY

## 2021-01-01 RX ORDER — ALBUTEROL SULFATE 90 UG/1
2 AEROSOL, METERED RESPIRATORY (INHALATION) 4 TIMES DAILY
Status: DISCONTINUED | OUTPATIENT
Start: 2021-01-01 | End: 2021-01-01

## 2021-01-01 RX ORDER — DEXTROSE MONOHYDRATE 25 G/50ML
25-50 INJECTION, SOLUTION INTRAVENOUS
Status: DISCONTINUED | OUTPATIENT
Start: 2021-01-01 | End: 2021-01-01

## 2021-01-01 RX ORDER — AZITHROMYCIN 250 MG/1
250 TABLET, FILM COATED ORAL DAILY
Status: DISCONTINUED | OUTPATIENT
Start: 2021-01-01 | End: 2021-01-01

## 2021-01-01 RX ORDER — SODIUM CHLORIDE, SODIUM LACTATE, POTASSIUM CHLORIDE, CALCIUM CHLORIDE 600; 310; 30; 20 MG/100ML; MG/100ML; MG/100ML; MG/100ML
INJECTION, SOLUTION INTRAVENOUS CONTINUOUS
Status: DISCONTINUED | OUTPATIENT
Start: 2021-01-01 | End: 2021-01-01

## 2021-01-01 RX ORDER — FUROSEMIDE 10 MG/ML
40 INJECTION INTRAMUSCULAR; INTRAVENOUS EVERY 8 HOURS
Status: COMPLETED | OUTPATIENT
Start: 2021-01-01 | End: 2021-01-01

## 2021-01-01 RX ORDER — DEXTROSE MONOHYDRATE 100 MG/ML
INJECTION, SOLUTION INTRAVENOUS CONTINUOUS PRN
Status: DISCONTINUED | OUTPATIENT
Start: 2021-01-01 | End: 2021-01-01

## 2021-01-01 RX ORDER — NALOXONE HYDROCHLORIDE 0.4 MG/ML
0.4 INJECTION, SOLUTION INTRAMUSCULAR; INTRAVENOUS; SUBCUTANEOUS
Status: DISCONTINUED | OUTPATIENT
Start: 2021-01-01 | End: 2021-01-01 | Stop reason: HOSPADM

## 2021-01-01 RX ORDER — IBUPROFEN 600 MG/1
600 TABLET, FILM COATED ORAL ONCE
Status: COMPLETED | OUTPATIENT
Start: 2021-01-01 | End: 2021-01-01

## 2021-01-01 RX ORDER — POLYETHYLENE GLYCOL 3350 17 G/17G
17 POWDER, FOR SOLUTION ORAL DAILY
Status: DISCONTINUED | OUTPATIENT
Start: 2021-01-01 | End: 2021-01-01

## 2021-01-01 RX ORDER — MIDAZOLAM HCL IN 0.9 % NACL/PF 1 MG/ML
1-8 PLASTIC BAG, INJECTION (ML) INTRAVENOUS CONTINUOUS
Status: DISCONTINUED | OUTPATIENT
Start: 2021-01-01 | End: 2021-01-01

## 2021-01-01 RX ORDER — ACETYLCYSTEINE 200 MG/ML
2 SOLUTION ORAL; RESPIRATORY (INHALATION) EVERY 4 HOURS
Status: DISCONTINUED | OUTPATIENT
Start: 2021-01-01 | End: 2021-01-01

## 2021-01-01 RX ORDER — FENTANYL CITRATE 50 UG/ML
100 INJECTION, SOLUTION INTRAMUSCULAR; INTRAVENOUS ONCE
Status: COMPLETED | OUTPATIENT
Start: 2021-01-01 | End: 2021-01-01

## 2021-01-01 RX ORDER — DEXAMETHASONE SODIUM PHOSPHATE 4 MG/ML
6 INJECTION, SOLUTION INTRA-ARTICULAR; INTRALESIONAL; INTRAMUSCULAR; INTRAVENOUS; SOFT TISSUE EVERY 24 HOURS
Status: DISCONTINUED | OUTPATIENT
Start: 2021-01-01 | End: 2021-01-01

## 2021-01-01 RX ORDER — FENTANYL CITRATE 50 UG/ML
INJECTION, SOLUTION INTRAMUSCULAR; INTRAVENOUS
Status: COMPLETED
Start: 2021-01-01 | End: 2021-01-01

## 2021-01-01 RX ORDER — POLYETHYLENE GLYCOL 3350 17 G/17G
17 POWDER, FOR SOLUTION ORAL 2 TIMES DAILY PRN
Status: DISCONTINUED | OUTPATIENT
Start: 2021-01-01 | End: 2021-01-01 | Stop reason: HOSPADM

## 2021-01-01 RX ORDER — BISACODYL 10 MG
10 SUPPOSITORY, RECTAL RECTAL DAILY PRN
Status: DISCONTINUED | OUTPATIENT
Start: 2021-01-01 | End: 2021-01-01 | Stop reason: HOSPADM

## 2021-01-01 RX ORDER — CHLORHEXIDINE GLUCONATE ORAL RINSE 1.2 MG/ML
15 SOLUTION DENTAL EVERY 12 HOURS
Status: DISCONTINUED | OUTPATIENT
Start: 2021-01-01 | End: 2021-01-01 | Stop reason: HOSPADM

## 2021-01-01 RX ORDER — NICOTINE POLACRILEX 4 MG
15-30 LOZENGE BUCCAL
Status: DISCONTINUED | OUTPATIENT
Start: 2021-01-01 | End: 2021-01-01 | Stop reason: HOSPADM

## 2021-01-01 RX ORDER — POLYETHYLENE GLYCOL 3350 17 G/17G
17 POWDER, FOR SOLUTION ORAL DAILY
Status: DISCONTINUED | OUTPATIENT
Start: 2021-01-01 | End: 2021-01-01 | Stop reason: HOSPADM

## 2021-01-01 RX ORDER — ALBUTEROL SULFATE 90 UG/1
2 AEROSOL, METERED RESPIRATORY (INHALATION) EVERY 6 HOURS PRN
Status: DISCONTINUED | OUTPATIENT
Start: 2021-01-01 | End: 2021-01-01 | Stop reason: HOSPADM

## 2021-01-01 RX ORDER — DEXAMETHASONE SODIUM PHOSPHATE 10 MG/ML
8 INJECTION, SOLUTION INTRAMUSCULAR; INTRAVENOUS ONCE
Status: COMPLETED | OUTPATIENT
Start: 2021-01-01 | End: 2021-01-01

## 2021-01-01 RX ADMIN — EPOPROSTENOL 20 NG/KG/MIN: 1.5 INJECTION, POWDER, LYOPHILIZED, FOR SOLUTION INTRAVENOUS at 20:12

## 2021-01-01 RX ADMIN — SODIUM CHLORIDE 98 ML: 9 INJECTION, SOLUTION INTRAVENOUS at 18:20

## 2021-01-01 RX ADMIN — DOCUSATE SODIUM 100 MG: 50 LIQUID ORAL at 21:05

## 2021-01-01 RX ADMIN — AZITHROMYCIN MONOHYDRATE 500 MG: 500 INJECTION, POWDER, LYOPHILIZED, FOR SOLUTION INTRAVENOUS at 21:29

## 2021-01-01 RX ADMIN — IOPAMIDOL 75 ML: 755 INJECTION, SOLUTION INTRAVENOUS at 17:01

## 2021-01-01 RX ADMIN — ALBUTEROL SULFATE 2 PUFF: 90 AEROSOL, METERED RESPIRATORY (INHALATION) at 14:25

## 2021-01-01 RX ADMIN — EPOPROSTENOL 20 NG/KG/MIN: 1.5 INJECTION, POWDER, LYOPHILIZED, FOR SOLUTION INTRAVENOUS at 02:29

## 2021-01-01 RX ADMIN — EPOPROSTENOL 20 NG/KG/MIN: 1.5 INJECTION, POWDER, LYOPHILIZED, FOR SOLUTION INTRAVENOUS at 14:38

## 2021-01-01 RX ADMIN — IBUPROFEN 600 MG: 600 TABLET ORAL at 16:18

## 2021-01-01 RX ADMIN — MIDAZOLAM 1 MG/HR: 5 INJECTION INTRAMUSCULAR; INTRAVENOUS at 13:13

## 2021-01-01 RX ADMIN — AZITHROMYCIN MONOHYDRATE 250 MG: 500 INJECTION, POWDER, LYOPHILIZED, FOR SOLUTION INTRAVENOUS at 14:47

## 2021-01-01 RX ADMIN — INSULIN ASPART 1 UNITS: 100 INJECTION, SOLUTION INTRAVENOUS; SUBCUTANEOUS at 16:18

## 2021-01-01 RX ADMIN — REMDESIVIR 100 MG: 100 INJECTION, POWDER, LYOPHILIZED, FOR SOLUTION INTRAVENOUS at 20:04

## 2021-01-01 RX ADMIN — EPOPROSTENOL 20 NG/KG/MIN: 1.5 INJECTION, POWDER, LYOPHILIZED, FOR SOLUTION INTRAVENOUS at 15:27

## 2021-01-01 RX ADMIN — EPOPROSTENOL 20 NG/KG/MIN: 1.5 INJECTION, POWDER, LYOPHILIZED, FOR SOLUTION INTRAVENOUS at 08:31

## 2021-01-01 RX ADMIN — ACETYLCYSTEINE 2 ML: 200 SOLUTION ORAL; RESPIRATORY (INHALATION) at 16:20

## 2021-01-01 RX ADMIN — Medication 100 MCG/HR: at 12:36

## 2021-01-01 RX ADMIN — ACETYLCYSTEINE 2 ML: 200 SOLUTION ORAL; RESPIRATORY (INHALATION) at 20:37

## 2021-01-01 RX ADMIN — ACETYLCYSTEINE 2 ML: 200 SOLUTION ORAL; RESPIRATORY (INHALATION) at 00:01

## 2021-01-01 RX ADMIN — PROPOFOL 50 MCG/KG/MIN: 10 INJECTION, EMULSION INTRAVENOUS at 13:54

## 2021-01-01 RX ADMIN — ENOXAPARIN SODIUM 40 MG: 40 INJECTION SUBCUTANEOUS at 21:53

## 2021-01-01 RX ADMIN — ACETYLCYSTEINE 2 ML: 200 SOLUTION ORAL; RESPIRATORY (INHALATION) at 11:24

## 2021-01-01 RX ADMIN — EPOPROSTENOL 20 NG/KG/MIN: 1.5 INJECTION, POWDER, LYOPHILIZED, FOR SOLUTION INTRAVENOUS at 10:25

## 2021-01-01 RX ADMIN — CEFTRIAXONE SODIUM 2 G: 2 INJECTION, POWDER, FOR SOLUTION INTRAMUSCULAR; INTRAVENOUS at 17:18

## 2021-01-01 RX ADMIN — MIDAZOLAM 4 MG/HR: 5 INJECTION INTRAMUSCULAR; INTRAVENOUS at 16:13

## 2021-01-01 RX ADMIN — EPOPROSTENOL 20 NG/KG/MIN: 1.5 INJECTION, POWDER, LYOPHILIZED, FOR SOLUTION INTRAVENOUS at 10:18

## 2021-01-01 RX ADMIN — ALBUTEROL SULFATE 2.5 MG: 2.5 SOLUTION RESPIRATORY (INHALATION) at 11:11

## 2021-01-01 RX ADMIN — Medication 40 MG: at 08:02

## 2021-01-01 RX ADMIN — Medication 0.03 MCG/KG/MIN: at 19:50

## 2021-01-01 RX ADMIN — INSULIN ASPART 1 UNITS: 100 INJECTION, SOLUTION INTRAVENOUS; SUBCUTANEOUS at 11:46

## 2021-01-01 RX ADMIN — Medication 40 MG: at 06:13

## 2021-01-01 RX ADMIN — ACETAMINOPHEN 650 MG: 325 TABLET, FILM COATED ORAL at 23:59

## 2021-01-01 RX ADMIN — DOCUSATE SODIUM 100 MG: 50 LIQUID ORAL at 20:02

## 2021-01-01 RX ADMIN — Medication 40 MG: at 08:21

## 2021-01-01 RX ADMIN — Medication 75 MCG/HR: at 20:04

## 2021-01-01 RX ADMIN — Medication 75 MCG/HR: at 11:18

## 2021-01-01 RX ADMIN — ALBUTEROL SULFATE 2.5 MG: 2.5 SOLUTION RESPIRATORY (INHALATION) at 02:48

## 2021-01-01 RX ADMIN — ALBUTEROL SULFATE 2.5 MG: 2.5 SOLUTION RESPIRATORY (INHALATION) at 07:44

## 2021-01-01 RX ADMIN — ENOXAPARIN SODIUM 40 MG: 40 INJECTION SUBCUTANEOUS at 08:17

## 2021-01-01 RX ADMIN — INSULIN ASPART 2 UNITS: 100 INJECTION, SOLUTION INTRAVENOUS; SUBCUTANEOUS at 08:46

## 2021-01-01 RX ADMIN — Medication 40 MG: at 08:04

## 2021-01-01 RX ADMIN — LEVOTHYROXINE SODIUM 112 MCG: 112 TABLET ORAL at 05:32

## 2021-01-01 RX ADMIN — ALBUTEROL SULFATE 2.5 MG: 2.5 SOLUTION RESPIRATORY (INHALATION) at 20:15

## 2021-01-01 RX ADMIN — MIDAZOLAM HYDROCHLORIDE 5 MG/HR: 1 INJECTION, SOLUTION INTRAVENOUS at 08:39

## 2021-01-01 RX ADMIN — ALBUTEROL SULFATE 2 PUFF: 90 AEROSOL, METERED RESPIRATORY (INHALATION) at 22:41

## 2021-01-01 RX ADMIN — CHLORHEXIDINE GLUCONATE 15 ML: 1.2 RINSE ORAL at 08:17

## 2021-01-01 RX ADMIN — ACETYLCYSTEINE 2 ML: 200 SOLUTION ORAL; RESPIRATORY (INHALATION) at 04:23

## 2021-01-01 RX ADMIN — MINERAL OIL AND PETROLATUM: 150; 830 OINTMENT OPHTHALMIC at 04:13

## 2021-01-01 RX ADMIN — DOCUSATE SODIUM 100 MG: 50 LIQUID ORAL at 20:05

## 2021-01-01 RX ADMIN — DOCUSATE SODIUM 100 MG: 50 LIQUID ORAL at 20:22

## 2021-01-01 RX ADMIN — ENOXAPARIN SODIUM 50 MG: 60 INJECTION SUBCUTANEOUS at 08:02

## 2021-01-01 RX ADMIN — LEVOTHYROXINE SODIUM 112 MCG: 112 TABLET ORAL at 06:07

## 2021-01-01 RX ADMIN — EPOPROSTENOL 20 NG/KG/MIN: 1.5 INJECTION, POWDER, LYOPHILIZED, FOR SOLUTION INTRAVENOUS at 23:24

## 2021-01-01 RX ADMIN — ENOXAPARIN SODIUM 40 MG: 40 INJECTION SUBCUTANEOUS at 08:03

## 2021-01-01 RX ADMIN — CHLORHEXIDINE GLUCONATE 15 ML: 1.2 RINSE ORAL at 19:48

## 2021-01-01 RX ADMIN — MIDAZOLAM HYDROCHLORIDE 3 MG/HR: 1 INJECTION, SOLUTION INTRAVENOUS at 11:49

## 2021-01-01 RX ADMIN — ENOXAPARIN SODIUM 40 MG: 40 INJECTION SUBCUTANEOUS at 20:24

## 2021-01-01 RX ADMIN — ACETYLCYSTEINE 2 ML: 200 SOLUTION ORAL; RESPIRATORY (INHALATION) at 12:39

## 2021-01-01 RX ADMIN — EPOPROSTENOL 20 NG/KG/MIN: 1.5 INJECTION, POWDER, LYOPHILIZED, FOR SOLUTION INTRAVENOUS at 04:28

## 2021-01-01 RX ADMIN — LEVOTHYROXINE SODIUM 112 MCG: 112 TABLET ORAL at 05:30

## 2021-01-01 RX ADMIN — Medication 0.04 MCG/KG/MIN: at 22:26

## 2021-01-01 RX ADMIN — Medication 0.06 MCG/KG/MIN: at 09:44

## 2021-01-01 RX ADMIN — MULTIVIT AND MINERALS-FERROUS GLUCONATE 9 MG IRON/15 ML ORAL LIQUID 15 ML: at 10:09

## 2021-01-01 RX ADMIN — EPOPROSTENOL 20 NG/KG/MIN: 1.5 INJECTION, POWDER, LYOPHILIZED, FOR SOLUTION INTRAVENOUS at 16:17

## 2021-01-01 RX ADMIN — VECURONIUM BROMIDE 0.3 MCG/KG/MIN: 1 INJECTION, POWDER, LYOPHILIZED, FOR SOLUTION INTRAVENOUS at 02:06

## 2021-01-01 RX ADMIN — ALBUTEROL SULFATE 2 PUFF: 90 AEROSOL, METERED RESPIRATORY (INHALATION) at 13:11

## 2021-01-01 RX ADMIN — FUROSEMIDE 40 MG: 10 INJECTION, SOLUTION INTRAVENOUS at 09:24

## 2021-01-01 RX ADMIN — MULTIVIT AND MINERALS-FERROUS GLUCONATE 9 MG IRON/15 ML ORAL LIQUID 15 ML: at 08:15

## 2021-01-01 RX ADMIN — INSULIN ASPART 3 UNITS: 100 INJECTION, SOLUTION INTRAVENOUS; SUBCUTANEOUS at 04:15

## 2021-01-01 RX ADMIN — ALBUTEROL SULFATE 2.5 MG: 2.5 SOLUTION RESPIRATORY (INHALATION) at 11:24

## 2021-01-01 RX ADMIN — ACETAMINOPHEN 650 MG: 650 SUPPOSITORY RECTAL at 21:30

## 2021-01-01 RX ADMIN — PANTOPRAZOLE SODIUM 40 MG: 40 INJECTION, POWDER, FOR SOLUTION INTRAVENOUS at 08:44

## 2021-01-01 RX ADMIN — ENOXAPARIN SODIUM 40 MG: 40 INJECTION SUBCUTANEOUS at 08:26

## 2021-01-01 RX ADMIN — DOCUSATE SODIUM 100 MG: 50 LIQUID ORAL at 20:24

## 2021-01-01 RX ADMIN — ACETYLCYSTEINE 2 ML: 200 SOLUTION ORAL; RESPIRATORY (INHALATION) at 15:41

## 2021-01-01 RX ADMIN — ACETAZOLAMIDE 500 MG: 500 INJECTION, POWDER, LYOPHILIZED, FOR SOLUTION INTRAVENOUS at 09:57

## 2021-01-01 RX ADMIN — DEXAMETHASONE SODIUM PHOSPHATE 6 MG: 4 INJECTION, SOLUTION INTRAMUSCULAR; INTRAVENOUS at 14:00

## 2021-01-01 RX ADMIN — CEFTRIAXONE SODIUM 2 G: 2 INJECTION, POWDER, FOR SOLUTION INTRAMUSCULAR; INTRAVENOUS at 18:19

## 2021-01-01 RX ADMIN — POTASSIUM & SODIUM PHOSPHATES POWDER PACK 280-160-250 MG 1 PACKET: 280-160-250 PACK at 16:13

## 2021-01-01 RX ADMIN — ALBUTEROL SULFATE 2.5 MG: 2.5 SOLUTION RESPIRATORY (INHALATION) at 00:01

## 2021-01-01 RX ADMIN — MINERAL OIL AND PETROLATUM: 150; 830 OINTMENT OPHTHALMIC at 12:45

## 2021-01-01 RX ADMIN — ALBUTEROL SULFATE 2.5 MG: 2.5 SOLUTION RESPIRATORY (INHALATION) at 18:14

## 2021-01-01 RX ADMIN — MIDAZOLAM HYDROCHLORIDE 2 MG: 1 INJECTION, SOLUTION INTRAMUSCULAR; INTRAVENOUS at 13:01

## 2021-01-01 RX ADMIN — ALBUTEROL SULFATE 2.5 MG: 2.5 SOLUTION RESPIRATORY (INHALATION) at 18:28

## 2021-01-01 RX ADMIN — Medication 50 MCG/HR: at 13:19

## 2021-01-01 RX ADMIN — EPOPROSTENOL 20 NG/KG/MIN: 1.5 INJECTION, POWDER, LYOPHILIZED, FOR SOLUTION INTRAVENOUS at 00:00

## 2021-01-01 RX ADMIN — DOCUSATE SODIUM 100 MG: 50 LIQUID ORAL at 22:41

## 2021-01-01 RX ADMIN — CHLORHEXIDINE GLUCONATE 15 ML: 1.2 RINSE ORAL at 20:04

## 2021-01-01 RX ADMIN — ENOXAPARIN SODIUM 40 MG: 40 INJECTION SUBCUTANEOUS at 20:35

## 2021-01-01 RX ADMIN — FUROSEMIDE 40 MG: 10 INJECTION, SOLUTION INTRAVENOUS at 20:46

## 2021-01-01 RX ADMIN — CHLORHEXIDINE GLUCONATE 15 ML: 1.2 RINSE ORAL at 20:46

## 2021-01-01 RX ADMIN — ENOXAPARIN SODIUM 50 MG: 60 INJECTION SUBCUTANEOUS at 14:42

## 2021-01-01 RX ADMIN — EPOPROSTENOL 20 NG/KG/MIN: 1.5 INJECTION, POWDER, LYOPHILIZED, FOR SOLUTION INTRAVENOUS at 22:16

## 2021-01-01 RX ADMIN — Medication 100 MCG/HR: at 13:31

## 2021-01-01 RX ADMIN — ALBUTEROL SULFATE 2.5 MG: 2.5 SOLUTION RESPIRATORY (INHALATION) at 10:07

## 2021-01-01 RX ADMIN — CHLORHEXIDINE GLUCONATE 15 ML: 1.2 RINSE ORAL at 14:42

## 2021-01-01 RX ADMIN — CHLORHEXIDINE GLUCONATE 15 ML: 1.2 RINSE ORAL at 20:12

## 2021-01-01 RX ADMIN — CHLORHEXIDINE GLUCONATE 15 ML: 1.2 RINSE ORAL at 07:55

## 2021-01-01 RX ADMIN — INSULIN ASPART 1 UNITS: 100 INJECTION, SOLUTION INTRAVENOUS; SUBCUTANEOUS at 19:38

## 2021-01-01 RX ADMIN — CHLORHEXIDINE GLUCONATE 15 ML: 1.2 RINSE ORAL at 20:24

## 2021-01-01 RX ADMIN — SODIUM CHLORIDE 98 ML: 900 INJECTION INTRAVENOUS at 17:01

## 2021-01-01 RX ADMIN — FUROSEMIDE 40 MG: 10 INJECTION, SOLUTION INTRAVENOUS at 10:26

## 2021-01-01 RX ADMIN — EPOPROSTENOL 20 NG/KG/MIN: 1.5 INJECTION, POWDER, LYOPHILIZED, FOR SOLUTION INTRAVENOUS at 11:42

## 2021-01-01 RX ADMIN — Medication 40 MG: at 07:55

## 2021-01-01 RX ADMIN — LEVOTHYROXINE SODIUM 84 MCG: 20 INJECTION, SOLUTION INTRAVENOUS at 14:43

## 2021-01-01 RX ADMIN — ACETYLCYSTEINE 2 ML: 200 SOLUTION ORAL; RESPIRATORY (INHALATION) at 23:22

## 2021-01-01 RX ADMIN — DEXAMETHASONE SODIUM PHOSPHATE 12 MG: 10 INJECTION INTRAMUSCULAR; INTRAVENOUS at 12:37

## 2021-01-01 RX ADMIN — ALBUTEROL SULFATE 2.5 MG: 2.5 SOLUTION RESPIRATORY (INHALATION) at 11:06

## 2021-01-01 RX ADMIN — ALBUTEROL SULFATE 2 PUFF: 90 AEROSOL, METERED RESPIRATORY (INHALATION) at 17:28

## 2021-01-01 RX ADMIN — ALBUTEROL SULFATE 2.5 MG: 2.5 SOLUTION RESPIRATORY (INHALATION) at 11:01

## 2021-01-01 RX ADMIN — AZITHROMYCIN MONOHYDRATE 250 MG: 500 INJECTION, POWDER, LYOPHILIZED, FOR SOLUTION INTRAVENOUS at 16:00

## 2021-01-01 RX ADMIN — Medication 0.05 MCG/KG/MIN: at 14:45

## 2021-01-01 RX ADMIN — CHLORHEXIDINE GLUCONATE 15 ML: 1.2 RINSE ORAL at 20:35

## 2021-01-01 RX ADMIN — Medication 100 MCG/HR: at 12:13

## 2021-01-01 RX ADMIN — Medication 100 MCG/HR: at 14:52

## 2021-01-01 RX ADMIN — EPOPROSTENOL 20 NG/KG/MIN: 1.5 INJECTION, POWDER, LYOPHILIZED, FOR SOLUTION INTRAVENOUS at 11:35

## 2021-01-01 RX ADMIN — ACETYLCYSTEINE 2 ML: 200 SOLUTION ORAL; RESPIRATORY (INHALATION) at 03:03

## 2021-01-01 RX ADMIN — CHLORHEXIDINE GLUCONATE 15 ML: 1.2 RINSE ORAL at 07:41

## 2021-01-01 RX ADMIN — REMDESIVIR 100 MG: 100 INJECTION, POWDER, LYOPHILIZED, FOR SOLUTION INTRAVENOUS at 20:58

## 2021-01-01 RX ADMIN — Medication 2 PACKET: at 08:31

## 2021-01-01 RX ADMIN — CHLORHEXIDINE GLUCONATE 15 ML: 1.2 RINSE ORAL at 08:02

## 2021-01-01 RX ADMIN — DEXAMETHASONE SODIUM PHOSPHATE 12 MG: 10 INJECTION INTRAMUSCULAR; INTRAVENOUS at 12:45

## 2021-01-01 RX ADMIN — MULTIVIT AND MINERALS-FERROUS GLUCONATE 9 MG IRON/15 ML ORAL LIQUID 15 ML: at 08:02

## 2021-01-01 RX ADMIN — Medication 0.04 MCG/KG/MIN: at 03:37

## 2021-01-01 RX ADMIN — ALBUTEROL SULFATE 2.5 MG: 2.5 SOLUTION RESPIRATORY (INHALATION) at 20:13

## 2021-01-01 RX ADMIN — Medication 75 MCG/HR: at 01:57

## 2021-01-01 RX ADMIN — DOCUSATE SODIUM 100 MG: 50 LIQUID ORAL at 08:01

## 2021-01-01 RX ADMIN — SODIUM CHLORIDE 1.5 UNITS/HR: 9 INJECTION, SOLUTION INTRAVENOUS at 08:52

## 2021-01-01 RX ADMIN — EPOPROSTENOL 20 NG/KG/MIN: 1.5 INJECTION, POWDER, LYOPHILIZED, FOR SOLUTION INTRAVENOUS at 03:27

## 2021-01-01 RX ADMIN — SODIUM CHLORIDE, PRESERVATIVE FREE: 5 INJECTION INTRAVENOUS at 12:04

## 2021-01-01 RX ADMIN — ACETYLCYSTEINE 2 ML: 200 SOLUTION ORAL; RESPIRATORY (INHALATION) at 20:13

## 2021-01-01 RX ADMIN — DOCUSATE SODIUM 100 MG: 50 LIQUID ORAL at 09:00

## 2021-01-01 RX ADMIN — ACETYLCYSTEINE 2 ML: 200 SOLUTION ORAL; RESPIRATORY (INHALATION) at 00:02

## 2021-01-01 RX ADMIN — MULTIVIT AND MINERALS-FERROUS GLUCONATE 9 MG IRON/15 ML ORAL LIQUID 15 ML: at 08:22

## 2021-01-01 RX ADMIN — LEVOTHYROXINE SODIUM 112 MCG: 112 TABLET ORAL at 05:00

## 2021-01-01 RX ADMIN — Medication 40 MG: at 06:30

## 2021-01-01 RX ADMIN — EPOPROSTENOL 20 NG/KG/MIN: 1.5 INJECTION, POWDER, LYOPHILIZED, FOR SOLUTION INTRAVENOUS at 19:42

## 2021-01-01 RX ADMIN — ETOMIDATE 20 MG: 20 INJECTION, SOLUTION INTRAVENOUS at 12:18

## 2021-01-01 RX ADMIN — ACETYLCYSTEINE 2 ML: 200 SOLUTION ORAL; RESPIRATORY (INHALATION) at 11:01

## 2021-01-01 RX ADMIN — EPOPROSTENOL 20 NG/KG/MIN: 1.5 INJECTION, POWDER, LYOPHILIZED, FOR SOLUTION INTRAVENOUS at 02:06

## 2021-01-01 RX ADMIN — MINERAL OIL AND PETROLATUM: 150; 830 OINTMENT OPHTHALMIC at 20:33

## 2021-01-01 RX ADMIN — ACETYLCYSTEINE 2 ML: 200 SOLUTION ORAL; RESPIRATORY (INHALATION) at 11:11

## 2021-01-01 RX ADMIN — INSULIN GLARGINE 10 UNITS: 100 INJECTION, SOLUTION SUBCUTANEOUS at 22:08

## 2021-01-01 RX ADMIN — MIDAZOLAM HYDROCHLORIDE 4 MG/HR: 1 INJECTION, SOLUTION INTRAVENOUS at 09:16

## 2021-01-01 RX ADMIN — ALBUTEROL SULFATE 2 PUFF: 90 AEROSOL, METERED RESPIRATORY (INHALATION) at 21:54

## 2021-01-01 RX ADMIN — DOCUSATE SODIUM 100 MG: 50 LIQUID ORAL at 08:44

## 2021-01-01 RX ADMIN — MINERAL OIL AND PETROLATUM: 150; 830 OINTMENT OPHTHALMIC at 19:44

## 2021-01-01 RX ADMIN — EPOPROSTENOL 20 NG/KG/MIN: 1.5 INJECTION, POWDER, LYOPHILIZED, FOR SOLUTION INTRAVENOUS at 04:21

## 2021-01-01 RX ADMIN — ENOXAPARIN SODIUM 50 MG: 60 INJECTION SUBCUTANEOUS at 08:01

## 2021-01-01 RX ADMIN — Medication 40 MG: at 08:27

## 2021-01-01 RX ADMIN — Medication 0.04 MCG/KG/MIN: at 10:18

## 2021-01-01 RX ADMIN — ENOXAPARIN SODIUM 40 MG: 40 INJECTION SUBCUTANEOUS at 20:00

## 2021-01-01 RX ADMIN — FENTANYL CITRATE 100 MCG: 50 INJECTION, SOLUTION INTRAMUSCULAR; INTRAVENOUS at 12:14

## 2021-01-01 RX ADMIN — FUROSEMIDE 40 MG: 10 INJECTION, SOLUTION INTRAVENOUS at 11:49

## 2021-01-01 RX ADMIN — REMDESIVIR 100 MG: 100 INJECTION, POWDER, LYOPHILIZED, FOR SOLUTION INTRAVENOUS at 19:50

## 2021-01-01 RX ADMIN — ENOXAPARIN SODIUM 40 MG: 40 INJECTION SUBCUTANEOUS at 22:41

## 2021-01-01 RX ADMIN — ACETYLCYSTEINE 2 ML: 200 SOLUTION ORAL; RESPIRATORY (INHALATION) at 16:27

## 2021-01-01 RX ADMIN — CHLORHEXIDINE GLUCONATE 15 ML: 1.2 RINSE ORAL at 19:43

## 2021-01-01 RX ADMIN — MIDAZOLAM 2 MG/HR: 5 INJECTION INTRAMUSCULAR; INTRAVENOUS at 20:33

## 2021-01-01 RX ADMIN — MINERAL OIL AND PETROLATUM: 150; 830 OINTMENT OPHTHALMIC at 04:10

## 2021-01-01 RX ADMIN — Medication 40 MG: at 07:41

## 2021-01-01 RX ADMIN — CHLORHEXIDINE GLUCONATE 15 ML: 1.2 RINSE ORAL at 19:38

## 2021-01-01 RX ADMIN — EPOPROSTENOL 20 NG/KG/MIN: 1.5 INJECTION, POWDER, LYOPHILIZED, FOR SOLUTION INTRAVENOUS at 05:42

## 2021-01-01 RX ADMIN — Medication 100 MCG/HR: at 10:11

## 2021-01-01 RX ADMIN — CHLORHEXIDINE GLUCONATE 15 ML: 1.2 RINSE ORAL at 08:15

## 2021-01-01 RX ADMIN — DEXAMETHASONE SODIUM PHOSPHATE 6 MG: 4 INJECTION, SOLUTION INTRAMUSCULAR; INTRAVENOUS at 14:42

## 2021-01-01 RX ADMIN — INSULIN GLARGINE 15 UNITS: 100 INJECTION, SOLUTION SUBCUTANEOUS at 08:57

## 2021-01-01 RX ADMIN — ACETYLCYSTEINE 2 ML: 200 SOLUTION ORAL; RESPIRATORY (INHALATION) at 07:36

## 2021-01-01 RX ADMIN — MINERAL OIL AND PETROLATUM: 150; 830 OINTMENT OPHTHALMIC at 12:01

## 2021-01-01 RX ADMIN — ALBUTEROL SULFATE 2.5 MG: 2.5 SOLUTION RESPIRATORY (INHALATION) at 07:36

## 2021-01-01 RX ADMIN — MIDAZOLAM HYDROCHLORIDE 2 MG: 1 INJECTION, SOLUTION INTRAMUSCULAR; INTRAVENOUS at 12:27

## 2021-01-01 RX ADMIN — DEXAMETHASONE SODIUM PHOSPHATE 12 MG: 10 INJECTION INTRAMUSCULAR; INTRAVENOUS at 12:09

## 2021-01-01 RX ADMIN — CHLORHEXIDINE GLUCONATE 15 ML: 1.2 RINSE ORAL at 21:05

## 2021-01-01 RX ADMIN — EPOPROSTENOL 20 NG/KG/MIN: 1.5 INJECTION, POWDER, LYOPHILIZED, FOR SOLUTION INTRAVENOUS at 18:19

## 2021-01-01 RX ADMIN — ALBUTEROL SULFATE 2 PUFF: 90 AEROSOL, METERED RESPIRATORY (INHALATION) at 21:05

## 2021-01-01 RX ADMIN — PROPOFOL 5 MCG/KG/MIN: 10 INJECTION, EMULSION INTRAVENOUS at 12:00

## 2021-01-01 RX ADMIN — DEXAMETHASONE SODIUM PHOSPHATE 6 MG: 10 INJECTION, SOLUTION INTRAMUSCULAR; INTRAVENOUS at 15:00

## 2021-01-01 RX ADMIN — Medication 0.06 MCG/KG/MIN: at 00:03

## 2021-01-01 RX ADMIN — LEVOTHYROXINE SODIUM 112 MCG: 112 TABLET ORAL at 06:13

## 2021-01-01 RX ADMIN — ACETYLCYSTEINE 2 ML: 200 SOLUTION ORAL; RESPIRATORY (INHALATION) at 07:54

## 2021-01-01 RX ADMIN — MIDAZOLAM HYDROCHLORIDE 3 MG/HR: 1 INJECTION, SOLUTION INTRAVENOUS at 06:52

## 2021-01-01 RX ADMIN — SODIUM CHLORIDE, POTASSIUM CHLORIDE, SODIUM LACTATE AND CALCIUM CHLORIDE: 600; 310; 30; 20 INJECTION, SOLUTION INTRAVENOUS at 00:23

## 2021-01-01 RX ADMIN — FUROSEMIDE 40 MG: 10 INJECTION, SOLUTION INTRAVENOUS at 10:06

## 2021-01-01 RX ADMIN — AZITHROMYCIN 250 MG: 250 TABLET, FILM COATED ORAL at 08:22

## 2021-01-01 RX ADMIN — POLYETHYLENE GLYCOL 3350 17 G: 17 POWDER, FOR SOLUTION ORAL at 08:56

## 2021-01-01 RX ADMIN — ACETYLCYSTEINE 2 ML: 200 SOLUTION ORAL; RESPIRATORY (INHALATION) at 07:29

## 2021-01-01 RX ADMIN — DEXAMETHASONE SODIUM PHOSPHATE 12 MG: 10 INJECTION INTRAMUSCULAR; INTRAVENOUS at 12:15

## 2021-01-01 RX ADMIN — LEVOTHYROXINE SODIUM 112 MCG: 112 TABLET ORAL at 05:41

## 2021-01-01 RX ADMIN — SODIUM CHLORIDE 50 ML: 9 INJECTION, SOLUTION INTRAVENOUS at 20:16

## 2021-01-01 RX ADMIN — ALBUTEROL SULFATE 2.5 MG: 2.5 SOLUTION RESPIRATORY (INHALATION) at 04:23

## 2021-01-01 RX ADMIN — ALBUTEROL SULFATE 2.5 MG: 2.5 SOLUTION RESPIRATORY (INHALATION) at 20:36

## 2021-01-01 RX ADMIN — LEVOTHYROXINE SODIUM 112 MCG: 112 TABLET ORAL at 12:04

## 2021-01-01 RX ADMIN — ALBUTEROL SULFATE 2.5 MG: 2.5 SOLUTION RESPIRATORY (INHALATION) at 16:20

## 2021-01-01 RX ADMIN — LEVOTHYROXINE SODIUM 112 MCG: 112 TABLET ORAL at 06:17

## 2021-01-01 RX ADMIN — EPOPROSTENOL 20 NG/KG/MIN: 1.5 INJECTION, POWDER, LYOPHILIZED, FOR SOLUTION INTRAVENOUS at 15:35

## 2021-01-01 RX ADMIN — ACETYLCYSTEINE 2 ML: 200 SOLUTION ORAL; RESPIRATORY (INHALATION) at 15:23

## 2021-01-01 RX ADMIN — ACETYLCYSTEINE 2 ML: 200 SOLUTION ORAL; RESPIRATORY (INHALATION) at 02:45

## 2021-01-01 RX ADMIN — ACETYLCYSTEINE 2 ML: 200 SOLUTION ORAL; RESPIRATORY (INHALATION) at 02:23

## 2021-01-01 RX ADMIN — ACETYLCYSTEINE 2 ML: 200 SOLUTION ORAL; RESPIRATORY (INHALATION) at 08:46

## 2021-01-01 RX ADMIN — ALBUTEROL SULFATE 2.5 MG: 2.5 SOLUTION RESPIRATORY (INHALATION) at 02:53

## 2021-01-01 RX ADMIN — EPOPROSTENOL 20 NG/KG/MIN: 1.5 INJECTION, POWDER, LYOPHILIZED, FOR SOLUTION INTRAVENOUS at 16:12

## 2021-01-01 RX ADMIN — ENOXAPARIN SODIUM 40 MG: 40 INJECTION SUBCUTANEOUS at 23:15

## 2021-01-01 RX ADMIN — DOCUSATE SODIUM 100 MG: 50 LIQUID ORAL at 08:21

## 2021-01-01 RX ADMIN — CHLORHEXIDINE GLUCONATE 15 ML: 1.2 RINSE ORAL at 09:00

## 2021-01-01 RX ADMIN — CHLORHEXIDINE GLUCONATE 15 ML: 1.2 RINSE ORAL at 19:34

## 2021-01-01 RX ADMIN — SODIUM CHLORIDE 6 UNITS/HR: 9 INJECTION, SOLUTION INTRAVENOUS at 13:58

## 2021-01-01 RX ADMIN — Medication 0.01 MCG/KG/MIN: at 14:11

## 2021-01-01 RX ADMIN — ALBUTEROL SULFATE 2 PUFF: 90 AEROSOL, METERED RESPIRATORY (INHALATION) at 08:25

## 2021-01-01 RX ADMIN — Medication 40 MG: at 08:17

## 2021-01-01 RX ADMIN — EPOPROSTENOL 10 NG/KG/MIN: 1.5 INJECTION, POWDER, LYOPHILIZED, FOR SOLUTION INTRAVENOUS at 01:12

## 2021-01-01 RX ADMIN — ENOXAPARIN SODIUM 50 MG: 60 INJECTION SUBCUTANEOUS at 21:01

## 2021-01-01 RX ADMIN — ALBUTEROL SULFATE 2.5 MG: 2.5 SOLUTION RESPIRATORY (INHALATION) at 02:25

## 2021-01-01 RX ADMIN — Medication 0.02 MCG/KG/MIN: at 11:02

## 2021-01-01 RX ADMIN — DOCUSATE SODIUM 100 MG: 50 LIQUID ORAL at 08:18

## 2021-01-01 RX ADMIN — CHLORHEXIDINE GLUCONATE 15 ML: 1.2 RINSE ORAL at 20:05

## 2021-01-01 RX ADMIN — ACETYLCYSTEINE 2 ML: 200 SOLUTION ORAL; RESPIRATORY (INHALATION) at 07:04

## 2021-01-01 RX ADMIN — LEVOTHYROXINE SODIUM 84 MCG: 20 INJECTION, SOLUTION INTRAVENOUS at 14:06

## 2021-01-01 RX ADMIN — Medication 75 MCG/HR: at 06:34

## 2021-01-01 RX ADMIN — CHLORHEXIDINE GLUCONATE 15 ML: 1.2 RINSE ORAL at 20:48

## 2021-01-01 RX ADMIN — EPOPROSTENOL 20 NG/KG/MIN: 1.5 INJECTION, POWDER, LYOPHILIZED, FOR SOLUTION INTRAVENOUS at 22:15

## 2021-01-01 RX ADMIN — ALBUTEROL SULFATE 2.5 MG: 2.5 SOLUTION RESPIRATORY (INHALATION) at 07:29

## 2021-01-01 RX ADMIN — EPOPROSTENOL 20 NG/KG/MIN: 1.5 INJECTION, POWDER, LYOPHILIZED, FOR SOLUTION INTRAVENOUS at 20:32

## 2021-01-01 RX ADMIN — LEVOTHYROXINE SODIUM 112 MCG: 112 TABLET ORAL at 06:29

## 2021-01-01 RX ADMIN — MINERAL OIL AND PETROLATUM: 150; 830 OINTMENT OPHTHALMIC at 04:11

## 2021-01-01 RX ADMIN — ALBUTEROL SULFATE 2.5 MG: 2.5 SOLUTION RESPIRATORY (INHALATION) at 23:45

## 2021-01-01 RX ADMIN — Medication 40 MG: at 06:12

## 2021-01-01 RX ADMIN — CHLORHEXIDINE GLUCONATE 15 ML: 1.2 RINSE ORAL at 20:33

## 2021-01-01 RX ADMIN — CHLORHEXIDINE GLUCONATE 15 ML: 1.2 RINSE ORAL at 08:22

## 2021-01-01 RX ADMIN — LEVOTHYROXINE SODIUM 112 MCG: 112 TABLET ORAL at 05:05

## 2021-01-01 RX ADMIN — ENOXAPARIN SODIUM 50 MG: 60 INJECTION SUBCUTANEOUS at 08:21

## 2021-01-01 RX ADMIN — POTASSIUM & SODIUM PHOSPHATES POWDER PACK 280-160-250 MG 1 PACKET: 280-160-250 PACK at 08:29

## 2021-01-01 RX ADMIN — FUROSEMIDE 40 MG: 10 INJECTION, SOLUTION INTRAVENOUS at 16:13

## 2021-01-01 RX ADMIN — DEXAMETHASONE SODIUM PHOSPHATE 8 MG: 10 INJECTION INTRAMUSCULAR; INTRAVENOUS at 18:20

## 2021-01-01 RX ADMIN — CHLORHEXIDINE GLUCONATE 15 ML: 1.2 RINSE ORAL at 08:03

## 2021-01-01 RX ADMIN — EPOPROSTENOL 10 NG/KG/MIN: 1.5 INJECTION, POWDER, LYOPHILIZED, FOR SOLUTION INTRAVENOUS at 11:48

## 2021-01-01 RX ADMIN — LEVOTHYROXINE SODIUM 112 MCG: 112 TABLET ORAL at 05:58

## 2021-01-01 RX ADMIN — ALBUTEROL SULFATE 2.5 MG: 2.5 SOLUTION RESPIRATORY (INHALATION) at 16:26

## 2021-01-01 RX ADMIN — EPOPROSTENOL 20 NG/KG/MIN: 1.5 INJECTION, POWDER, LYOPHILIZED, FOR SOLUTION INTRAVENOUS at 14:07

## 2021-01-01 RX ADMIN — MULTIVIT AND MINERALS-FERROUS GLUCONATE 9 MG IRON/15 ML ORAL LIQUID 15 ML: at 09:00

## 2021-01-01 RX ADMIN — ENOXAPARIN SODIUM 40 MG: 40 INJECTION SUBCUTANEOUS at 21:05

## 2021-01-01 RX ADMIN — REMDESIVIR 100 MG: 100 INJECTION, POWDER, LYOPHILIZED, FOR SOLUTION INTRAVENOUS at 20:07

## 2021-01-01 RX ADMIN — ACETYLCYSTEINE 2 ML: 200 SOLUTION ORAL; RESPIRATORY (INHALATION) at 02:53

## 2021-01-01 RX ADMIN — ENOXAPARIN SODIUM 40 MG: 40 INJECTION SUBCUTANEOUS at 08:39

## 2021-01-01 RX ADMIN — SODIUM CHLORIDE 5 UNITS/HR: 9 INJECTION, SOLUTION INTRAVENOUS at 01:32

## 2021-01-01 RX ADMIN — INSULIN GLARGINE 15 UNITS: 100 INJECTION, SOLUTION SUBCUTANEOUS at 22:01

## 2021-01-01 RX ADMIN — ENOXAPARIN SODIUM 50 MG: 60 INJECTION SUBCUTANEOUS at 20:05

## 2021-01-01 RX ADMIN — ENOXAPARIN SODIUM 40 MG: 40 INJECTION SUBCUTANEOUS at 20:49

## 2021-01-01 RX ADMIN — ALBUTEROL SULFATE 2.5 MG: 2.5 SOLUTION RESPIRATORY (INHALATION) at 07:04

## 2021-01-01 RX ADMIN — Medication 100 MCG/HR: at 20:13

## 2021-01-01 RX ADMIN — DOCUSATE SODIUM 100 MG: 50 LIQUID ORAL at 07:54

## 2021-01-01 RX ADMIN — ALBUTEROL SULFATE 2 PUFF: 90 AEROSOL, METERED RESPIRATORY (INHALATION) at 09:18

## 2021-01-01 RX ADMIN — EPOPROSTENOL 20 NG/KG/MIN: 1.5 INJECTION, POWDER, LYOPHILIZED, FOR SOLUTION INTRAVENOUS at 04:12

## 2021-01-01 RX ADMIN — ENOXAPARIN SODIUM 40 MG: 40 INJECTION SUBCUTANEOUS at 20:03

## 2021-01-01 RX ADMIN — ENOXAPARIN SODIUM 40 MG: 40 INJECTION SUBCUTANEOUS at 07:54

## 2021-01-01 RX ADMIN — ACETYLCYSTEINE 2 ML: 200 SOLUTION ORAL; RESPIRATORY (INHALATION) at 22:34

## 2021-01-01 RX ADMIN — ENOXAPARIN SODIUM 50 MG: 60 INJECTION SUBCUTANEOUS at 20:33

## 2021-01-01 RX ADMIN — DEXAMETHASONE SODIUM PHOSPHATE 6 MG: 4 INJECTION, SOLUTION INTRAMUSCULAR; INTRAVENOUS at 13:11

## 2021-01-01 RX ADMIN — MULTIVIT AND MINERALS-FERROUS GLUCONATE 9 MG IRON/15 ML ORAL LIQUID 15 ML: at 08:27

## 2021-01-01 RX ADMIN — VECURONIUM BROMIDE 0.8 MCG/KG/MIN: 1 INJECTION, POWDER, LYOPHILIZED, FOR SOLUTION INTRAVENOUS at 13:30

## 2021-01-01 RX ADMIN — Medication 0.04 MCG/KG/MIN: at 20:24

## 2021-01-01 RX ADMIN — ALBUTEROL SULFATE 2.5 MG: 2.5 SOLUTION RESPIRATORY (INHALATION) at 00:04

## 2021-01-01 RX ADMIN — EPOPROSTENOL 20 NG/KG/MIN: 1.5 INJECTION, POWDER, LYOPHILIZED, FOR SOLUTION INTRAVENOUS at 21:56

## 2021-01-01 RX ADMIN — ALBUTEROL SULFATE 2.5 MG: 2.5 SOLUTION RESPIRATORY (INHALATION) at 15:23

## 2021-01-01 RX ADMIN — Medication 0.04 MCG/KG/MIN: at 08:14

## 2021-01-01 RX ADMIN — ALBUTEROL SULFATE 2.5 MG: 2.5 SOLUTION RESPIRATORY (INHALATION) at 12:39

## 2021-01-01 RX ADMIN — POLYETHYLENE GLYCOL 3350 17 G: 17 POWDER, FOR SOLUTION ORAL at 08:18

## 2021-01-01 RX ADMIN — LEVOTHYROXINE SODIUM 112 MCG: 112 TABLET ORAL at 06:20

## 2021-01-01 RX ADMIN — MIDAZOLAM HYDROCHLORIDE 2 MG/HR: 1 INJECTION, SOLUTION INTRAVENOUS at 20:21

## 2021-01-01 RX ADMIN — INSULIN ASPART 3 UNITS: 100 INJECTION, SOLUTION INTRAVENOUS; SUBCUTANEOUS at 00:23

## 2021-01-01 RX ADMIN — Medication 100 MCG/HR: at 04:37

## 2021-01-01 RX ADMIN — SUCCINYLCHOLINE CHLORIDE 100 MG: 20 INJECTION, SOLUTION INTRAMUSCULAR; INTRAVENOUS; PARENTERAL at 12:18

## 2021-01-01 RX ADMIN — VECURONIUM BROMIDE 0.6 MCG/KG/MIN: 1 INJECTION, POWDER, LYOPHILIZED, FOR SOLUTION INTRAVENOUS at 20:46

## 2021-01-01 RX ADMIN — ACETYLCYSTEINE 2 ML: 200 SOLUTION ORAL; RESPIRATORY (INHALATION) at 10:07

## 2021-01-01 RX ADMIN — MIDAZOLAM HYDROCHLORIDE 3 MG/HR: 1 INJECTION, SOLUTION INTRAVENOUS at 15:29

## 2021-01-01 RX ADMIN — DOCUSATE SODIUM 100 MG: 50 LIQUID ORAL at 20:07

## 2021-01-01 RX ADMIN — ACETYLCYSTEINE 2 ML: 200 SOLUTION ORAL; RESPIRATORY (INHALATION) at 14:54

## 2021-01-01 RX ADMIN — ACETYLCYSTEINE 2 ML: 200 SOLUTION ORAL; RESPIRATORY (INHALATION) at 07:40

## 2021-01-01 RX ADMIN — ALBUTEROL SULFATE 2.5 MG: 2.5 SOLUTION RESPIRATORY (INHALATION) at 15:44

## 2021-01-01 RX ADMIN — AZITHROMYCIN MONOHYDRATE 250 MG: 500 INJECTION, POWDER, LYOPHILIZED, FOR SOLUTION INTRAVENOUS at 13:55

## 2021-01-01 RX ADMIN — EPOPROSTENOL 20 NG/KG/MIN: 1.5 INJECTION, POWDER, LYOPHILIZED, FOR SOLUTION INTRAVENOUS at 08:59

## 2021-01-01 RX ADMIN — FUROSEMIDE 40 MG: 10 INJECTION, SOLUTION INTRAVENOUS at 21:06

## 2021-01-01 RX ADMIN — MULTIVIT AND MINERALS-FERROUS GLUCONATE 9 MG IRON/15 ML ORAL LIQUID 15 ML: at 08:01

## 2021-01-01 RX ADMIN — NOREPINEPHRINE BITARTRATE 0.03 MCG/KG/MIN: 1 INJECTION, SOLUTION, CONCENTRATE INTRAVENOUS at 12:00

## 2021-01-01 RX ADMIN — DEXAMETHASONE 6 MG: 2 TABLET ORAL at 14:13

## 2021-01-01 RX ADMIN — Medication 100 MCG/HR: at 22:17

## 2021-01-01 RX ADMIN — POLYETHYLENE GLYCOL 3350 17 G: 17 POWDER, FOR SOLUTION ORAL at 08:01

## 2021-01-01 RX ADMIN — CEFTRIAXONE SODIUM 2 G: 2 INJECTION, POWDER, FOR SOLUTION INTRAMUSCULAR; INTRAVENOUS at 18:45

## 2021-01-01 RX ADMIN — ACETYLCYSTEINE 2 ML: 200 SOLUTION ORAL; RESPIRATORY (INHALATION) at 04:13

## 2021-01-01 RX ADMIN — MIDAZOLAM HYDROCHLORIDE 3 MG/HR: 1 INJECTION, SOLUTION INTRAVENOUS at 12:14

## 2021-01-01 RX ADMIN — CHLORHEXIDINE GLUCONATE 15 ML: 1.2 RINSE ORAL at 07:57

## 2021-01-01 RX ADMIN — INSULIN ASPART 1 UNITS: 100 INJECTION, SOLUTION INTRAVENOUS; SUBCUTANEOUS at 08:45

## 2021-01-01 RX ADMIN — ALBUTEROL SULFATE 2 PUFF: 90 AEROSOL, METERED RESPIRATORY (INHALATION) at 17:18

## 2021-01-01 RX ADMIN — TOCILIZUMAB 800 MG: 20 INJECTION, SOLUTION, CONCENTRATE INTRAVENOUS at 13:03

## 2021-01-01 RX ADMIN — Medication 0.04 MCG/KG/MIN: at 16:33

## 2021-01-01 RX ADMIN — LEVOTHYROXINE SODIUM 84 MCG: 20 INJECTION, SOLUTION INTRAVENOUS at 13:55

## 2021-01-01 RX ADMIN — FUROSEMIDE 40 MG: 10 INJECTION, SOLUTION INTRAVENOUS at 05:05

## 2021-01-01 RX ADMIN — ACETYLCYSTEINE 2 ML: 200 SOLUTION ORAL; RESPIRATORY (INHALATION) at 07:44

## 2021-01-01 RX ADMIN — LEVOTHYROXINE SODIUM 112 MCG: 112 TABLET ORAL at 06:00

## 2021-01-01 RX ADMIN — DOCUSATE SODIUM 100 MG: 50 LIQUID ORAL at 20:33

## 2021-01-01 RX ADMIN — EPOPROSTENOL 20 NG/KG/MIN: 1.5 INJECTION, POWDER, LYOPHILIZED, FOR SOLUTION INTRAVENOUS at 03:48

## 2021-01-01 RX ADMIN — ALBUTEROL SULFATE 2.5 MG: 2.5 SOLUTION RESPIRATORY (INHALATION) at 23:25

## 2021-01-01 RX ADMIN — ACETYLCYSTEINE 2 ML: 200 SOLUTION ORAL; RESPIRATORY (INHALATION) at 21:10

## 2021-01-01 RX ADMIN — MIDAZOLAM 7 MG/HR: 5 INJECTION INTRAMUSCULAR; INTRAVENOUS at 01:42

## 2021-01-01 RX ADMIN — Medication 0.02 MCG/KG/MIN: at 05:08

## 2021-01-01 RX ADMIN — ALBUTEROL SULFATE 2 PUFF: 90 AEROSOL, METERED RESPIRATORY (INHALATION) at 14:13

## 2021-01-01 RX ADMIN — FUROSEMIDE 40 MG: 10 INJECTION, SOLUTION INTRAVENOUS at 12:15

## 2021-01-01 RX ADMIN — EPOPROSTENOL 20 NG/KG/MIN: 1.5 INJECTION, POWDER, LYOPHILIZED, FOR SOLUTION INTRAVENOUS at 01:41

## 2021-01-01 RX ADMIN — SODIUM CHLORIDE 50 ML: 9 INJECTION, SOLUTION INTRAVENOUS at 23:47

## 2021-01-01 RX ADMIN — DOCUSATE SODIUM 100 MG: 50 LIQUID ORAL at 20:13

## 2021-01-01 RX ADMIN — ALBUTEROL SULFATE 2.5 MG: 2.5 SOLUTION RESPIRATORY (INHALATION) at 22:35

## 2021-01-01 RX ADMIN — ACETYLCYSTEINE 2 ML: 200 SOLUTION ORAL; RESPIRATORY (INHALATION) at 23:44

## 2021-01-01 RX ADMIN — SODIUM CHLORIDE, PRESERVATIVE FREE: 5 INJECTION INTRAVENOUS at 07:00

## 2021-01-01 RX ADMIN — BISACODYL 10 MG: 10 SUPPOSITORY RECTAL at 07:41

## 2021-01-01 RX ADMIN — DOCUSATE SODIUM 100 MG: 50 LIQUID ORAL at 20:35

## 2021-01-01 RX ADMIN — ACETAMINOPHEN 650 MG: 650 SUPPOSITORY RECTAL at 11:41

## 2021-01-01 RX ADMIN — CEFTRIAXONE SODIUM 2 G: 2 INJECTION, POWDER, FOR SOLUTION INTRAMUSCULAR; INTRAVENOUS at 18:35

## 2021-01-01 RX ADMIN — LEVOTHYROXINE SODIUM 112 MCG: 112 TABLET ORAL at 06:34

## 2021-01-01 RX ADMIN — MINERAL OIL AND PETROLATUM: 150; 830 OINTMENT OPHTHALMIC at 14:43

## 2021-01-01 RX ADMIN — ENOXAPARIN SODIUM 50 MG: 60 INJECTION SUBCUTANEOUS at 20:01

## 2021-01-01 RX ADMIN — FUROSEMIDE 40 MG: 10 INJECTION, SOLUTION INTRAVENOUS at 13:00

## 2021-01-01 RX ADMIN — LEVOTHYROXINE SODIUM 112 MCG: 112 TABLET ORAL at 06:18

## 2021-01-01 RX ADMIN — ALBUTEROL SULFATE 2.5 MG: 2.5 SOLUTION RESPIRATORY (INHALATION) at 11:00

## 2021-01-01 RX ADMIN — ENOXAPARIN SODIUM 50 MG: 60 INJECTION SUBCUTANEOUS at 08:30

## 2021-01-01 RX ADMIN — EPOPROSTENOL 20 NG/KG/MIN: 1.5 INJECTION, POWDER, LYOPHILIZED, FOR SOLUTION INTRAVENOUS at 15:23

## 2021-01-01 RX ADMIN — Medication 0.03 MCG/KG/MIN: at 12:00

## 2021-01-01 RX ADMIN — ACETYLCYSTEINE 2 ML: 200 SOLUTION ORAL; RESPIRATORY (INHALATION) at 20:15

## 2021-01-01 RX ADMIN — LEVOTHYROXINE SODIUM 112 MCG: 112 TABLET ORAL at 08:22

## 2021-01-01 RX ADMIN — ALBUTEROL SULFATE 2.5 MG: 2.5 SOLUTION RESPIRATORY (INHALATION) at 08:45

## 2021-01-01 RX ADMIN — FUROSEMIDE 40 MG: 10 INJECTION, SOLUTION INTRAVENOUS at 03:27

## 2021-01-01 RX ADMIN — ACETYLCYSTEINE 2 ML: 200 SOLUTION ORAL; RESPIRATORY (INHALATION) at 15:44

## 2021-01-01 RX ADMIN — ALBUTEROL SULFATE 2.5 MG: 2.5 SOLUTION RESPIRATORY (INHALATION) at 15:41

## 2021-01-01 RX ADMIN — FUROSEMIDE 40 MG: 10 INJECTION, SOLUTION INTRAVENOUS at 10:42

## 2021-01-01 RX ADMIN — SODIUM CHLORIDE 9 UNITS/HR: 9 INJECTION, SOLUTION INTRAVENOUS at 22:24

## 2021-01-01 RX ADMIN — Medication 40 MG: at 06:00

## 2021-01-01 RX ADMIN — SODIUM CHLORIDE 50 ML: 9 INJECTION, SOLUTION INTRAVENOUS at 20:06

## 2021-01-01 RX ADMIN — Medication 100 MCG/HR: at 14:47

## 2021-01-01 RX ADMIN — CHLORHEXIDINE GLUCONATE 15 ML: 1.2 RINSE ORAL at 08:44

## 2021-01-01 RX ADMIN — ENOXAPARIN SODIUM 50 MG: 60 INJECTION SUBCUTANEOUS at 20:15

## 2021-01-01 RX ADMIN — MIDAZOLAM 3 MG/HR: 5 INJECTION INTRAMUSCULAR; INTRAVENOUS at 05:58

## 2021-01-01 RX ADMIN — ALBUTEROL SULFATE 2.5 MG: 2.5 SOLUTION RESPIRATORY (INHALATION) at 00:02

## 2021-01-01 RX ADMIN — ENOXAPARIN SODIUM 50 MG: 60 INJECTION SUBCUTANEOUS at 09:01

## 2021-01-01 RX ADMIN — ALBUTEROL SULFATE 2.5 MG: 2.5 SOLUTION RESPIRATORY (INHALATION) at 20:16

## 2021-01-01 RX ADMIN — ACETAMINOPHEN 650 MG: 650 SUPPOSITORY RECTAL at 08:39

## 2021-01-01 RX ADMIN — CEFTRIAXONE SODIUM 2 G: 2 INJECTION, POWDER, FOR SOLUTION INTRAMUSCULAR; INTRAVENOUS at 17:27

## 2021-01-01 RX ADMIN — FUROSEMIDE 40 MG: 10 INJECTION, SOLUTION INTRAVENOUS at 04:07

## 2021-01-01 RX ADMIN — MIDAZOLAM 3 MG/HR: 5 INJECTION INTRAMUSCULAR; INTRAVENOUS at 18:38

## 2021-01-01 RX ADMIN — ACETYLCYSTEINE 2 ML: 200 SOLUTION ORAL; RESPIRATORY (INHALATION) at 11:00

## 2021-01-01 RX ADMIN — SODIUM CHLORIDE, PRESERVATIVE FREE: 5 INJECTION INTRAVENOUS at 12:53

## 2021-01-01 RX ADMIN — ACETYLCYSTEINE 2 ML: 200 SOLUTION ORAL; RESPIRATORY (INHALATION) at 23:59

## 2021-01-01 RX ADMIN — SODIUM CHLORIDE: 9 INJECTION, SOLUTION INTRAVENOUS at 20:16

## 2021-01-01 RX ADMIN — ACETYLCYSTEINE 2 ML: 200 SOLUTION ORAL; RESPIRATORY (INHALATION) at 18:14

## 2021-01-01 RX ADMIN — ACETYLCYSTEINE 2 ML: 200 SOLUTION ORAL; RESPIRATORY (INHALATION) at 18:29

## 2021-01-01 RX ADMIN — REMDESIVIR 200 MG: 100 INJECTION, POWDER, LYOPHILIZED, FOR SOLUTION INTRAVENOUS at 23:47

## 2021-01-01 RX ADMIN — FUROSEMIDE 40 MG: 10 INJECTION, SOLUTION INTRAVENOUS at 08:47

## 2021-01-01 RX ADMIN — ENOXAPARIN SODIUM 50 MG: 60 INJECTION SUBCUTANEOUS at 20:02

## 2021-01-01 RX ADMIN — ACETYLCYSTEINE 2 ML: 200 SOLUTION ORAL; RESPIRATORY (INHALATION) at 11:06

## 2021-01-01 RX ADMIN — IOPAMIDOL 75 ML: 755 INJECTION, SOLUTION INTRAVENOUS at 18:20

## 2021-01-01 RX ADMIN — DOCUSATE SODIUM 100 MG: 50 LIQUID ORAL at 08:15

## 2021-01-01 RX ADMIN — EPOPROSTENOL 20 NG/KG/MIN: 1.5 INJECTION, POWDER, LYOPHILIZED, FOR SOLUTION INTRAVENOUS at 16:52

## 2021-01-01 RX ADMIN — ENOXAPARIN SODIUM 40 MG: 40 INJECTION SUBCUTANEOUS at 20:02

## 2021-01-01 RX ADMIN — ALBUTEROL SULFATE 2.5 MG: 2.5 SOLUTION RESPIRATORY (INHALATION) at 04:14

## 2021-01-01 RX ADMIN — ACETYLCYSTEINE 2 ML: 200 SOLUTION ORAL; RESPIRATORY (INHALATION) at 19:07

## 2021-01-01 RX ADMIN — CHLORHEXIDINE GLUCONATE 15 ML: 1.2 RINSE ORAL at 19:56

## 2021-01-01 RX ADMIN — ENOXAPARIN SODIUM 50 MG: 60 INJECTION SUBCUTANEOUS at 20:13

## 2021-01-01 RX ADMIN — SODIUM CHLORIDE 5 UNITS/HR: 9 INJECTION, SOLUTION INTRAVENOUS at 12:00

## 2021-01-01 RX ADMIN — ENOXAPARIN SODIUM 40 MG: 40 INJECTION SUBCUTANEOUS at 08:02

## 2021-01-01 RX ADMIN — Medication 100 MCG/HR: at 12:24

## 2021-01-01 RX ADMIN — EPOPROSTENOL 20 NG/KG/MIN: 1.5 INJECTION, POWDER, LYOPHILIZED, FOR SOLUTION INTRAVENOUS at 08:27

## 2021-01-01 RX ADMIN — ENOXAPARIN SODIUM 40 MG: 40 INJECTION SUBCUTANEOUS at 20:07

## 2021-01-01 RX ADMIN — Medication 0.06 MCG/KG/MIN: at 15:30

## 2021-01-01 RX ADMIN — ALBUTEROL SULFATE 2.5 MG: 2.5 SOLUTION RESPIRATORY (INHALATION) at 03:03

## 2021-01-01 RX ADMIN — SODIUM CHLORIDE 5 UNITS/HR: 9 INJECTION, SOLUTION INTRAVENOUS at 01:38

## 2021-01-01 RX ADMIN — ENOXAPARIN SODIUM 40 MG: 40 INJECTION SUBCUTANEOUS at 23:47

## 2021-01-01 RX ADMIN — Medication 100 MCG/HR: at 15:14

## 2021-01-01 RX ADMIN — ALBUTEROL SULFATE 2 PUFF: 90 AEROSOL, METERED RESPIRATORY (INHALATION) at 18:47

## 2021-01-01 RX ADMIN — POLYETHYLENE GLYCOL 3350 17 G: 17 POWDER, FOR SOLUTION ORAL at 16:21

## 2021-01-01 RX ADMIN — CHLORHEXIDINE GLUCONATE 15 ML: 1.2 RINSE ORAL at 08:28

## 2021-01-01 RX ADMIN — CHLORHEXIDINE GLUCONATE 15 ML: 1.2 RINSE ORAL at 07:45

## 2021-01-01 RX ADMIN — ACETYLCYSTEINE 2 ML: 200 SOLUTION ORAL; RESPIRATORY (INHALATION) at 11:53

## 2021-01-01 RX ADMIN — SODIUM CHLORIDE 3 UNITS/HR: 9 INJECTION, SOLUTION INTRAVENOUS at 16:49

## 2021-01-01 RX ADMIN — ALBUTEROL SULFATE 2.5 MG: 2.5 SOLUTION RESPIRATORY (INHALATION) at 19:08

## 2021-01-01 RX ADMIN — EPOPROSTENOL 10 NG/KG/MIN: 1.5 INJECTION, POWDER, LYOPHILIZED, FOR SOLUTION INTRAVENOUS at 04:08

## 2021-01-01 RX ADMIN — Medication 0.02 MCG/KG/MIN: at 20:21

## 2021-01-01 RX ADMIN — ALBUTEROL SULFATE 2.5 MG: 2.5 SOLUTION RESPIRATORY (INHALATION) at 07:53

## 2021-01-01 RX ADMIN — SODIUM CHLORIDE 50 ML: 9 INJECTION, SOLUTION INTRAVENOUS at 20:58

## 2021-01-01 RX ADMIN — POLYETHYLENE GLYCOL 3350 17 G: 17 POWDER, FOR SOLUTION ORAL at 11:49

## 2021-01-01 RX ADMIN — EPOPROSTENOL 10 NG/KG/MIN: 1.5 INJECTION, POWDER, LYOPHILIZED, FOR SOLUTION INTRAVENOUS at 15:00

## 2021-01-01 RX ADMIN — ENOXAPARIN SODIUM 40 MG: 40 INJECTION SUBCUTANEOUS at 20:46

## 2021-01-01 RX ADMIN — MIDAZOLAM HYDROCHLORIDE 5 MG/HR: 1 INJECTION, SOLUTION INTRAVENOUS at 03:49

## 2021-01-01 RX ADMIN — EPOPROSTENOL 20 NG/KG/MIN: 1.5 INJECTION, POWDER, LYOPHILIZED, FOR SOLUTION INTRAVENOUS at 20:36

## 2021-01-01 RX ADMIN — CHLORHEXIDINE GLUCONATE 15 ML: 1.2 RINSE ORAL at 19:42

## 2021-01-01 RX ADMIN — DOCUSATE SODIUM 100 MG: 50 LIQUID ORAL at 19:59

## 2021-01-01 RX ADMIN — ENOXAPARIN SODIUM 40 MG: 40 INJECTION SUBCUTANEOUS at 08:01

## 2021-01-01 RX ADMIN — SODIUM CHLORIDE 4 UNITS/HR: 9 INJECTION, SOLUTION INTRAVENOUS at 17:57

## 2021-01-01 RX ADMIN — CHLORHEXIDINE GLUCONATE 15 ML: 1.2 RINSE ORAL at 08:18

## 2021-01-01 RX ADMIN — Medication 0.06 MCG/KG/MIN: at 03:50

## 2021-01-01 RX ADMIN — ALBUTEROL SULFATE 2.5 MG: 2.5 SOLUTION RESPIRATORY (INHALATION) at 21:11

## 2021-01-01 RX ADMIN — CHLORHEXIDINE GLUCONATE 15 ML: 1.2 RINSE ORAL at 20:16

## 2021-01-01 RX ADMIN — ACETYLCYSTEINE 2 ML: 200 SOLUTION ORAL; RESPIRATORY (INHALATION) at 20:16

## 2021-01-01 RX ADMIN — MINERAL OIL AND PETROLATUM: 150; 830 OINTMENT OPHTHALMIC at 20:05

## 2021-01-01 RX ADMIN — ALBUTEROL SULFATE 2.5 MG: 2.5 SOLUTION RESPIRATORY (INHALATION) at 00:00

## 2021-01-01 RX ADMIN — DEXAMETHASONE SODIUM PHOSPHATE 12 MG: 10 INJECTION INTRAMUSCULAR; INTRAVENOUS at 12:03

## 2021-01-01 RX ADMIN — Medication 40 MG: at 06:05

## 2021-01-01 RX ADMIN — Medication 1 DROP: at 17:04

## 2021-01-01 RX ADMIN — ALBUTEROL SULFATE 2.5 MG: 2.5 SOLUTION RESPIRATORY (INHALATION) at 07:40

## 2021-01-01 RX ADMIN — SODIUM CHLORIDE 50 ML: 9 INJECTION, SOLUTION INTRAVENOUS at 20:30

## 2021-01-01 RX ADMIN — SODIUM CHLORIDE, POTASSIUM CHLORIDE, SODIUM LACTATE AND CALCIUM CHLORIDE: 600; 310; 30; 20 INJECTION, SOLUTION INTRAVENOUS at 12:56

## 2021-01-01 RX ADMIN — ENOXAPARIN SODIUM 50 MG: 60 INJECTION SUBCUTANEOUS at 08:15

## 2021-01-01 RX ADMIN — FUROSEMIDE 40 MG: 10 INJECTION, SOLUTION INTRAVENOUS at 11:39

## 2021-01-01 RX ADMIN — POTASSIUM & SODIUM PHOSPHATES POWDER PACK 280-160-250 MG 1 PACKET: 280-160-250 PACK at 21:01

## 2021-01-01 RX ADMIN — Medication 40 MG: at 07:57

## 2021-01-01 RX ADMIN — LEVOTHYROXINE SODIUM 112 MCG: 112 TABLET ORAL at 05:08

## 2021-01-01 RX ADMIN — ALBUTEROL SULFATE 2.5 MG: 2.5 SOLUTION RESPIRATORY (INHALATION) at 14:54

## 2021-01-01 RX ADMIN — FUROSEMIDE 40 MG: 10 INJECTION, SOLUTION INTRAVENOUS at 15:55

## 2021-01-01 RX ADMIN — EPOPROSTENOL 20 NG/KG/MIN: 1.5 INJECTION, POWDER, LYOPHILIZED, FOR SOLUTION INTRAVENOUS at 08:57

## 2021-01-01 RX ADMIN — ACETAZOLAMIDE 500 MG: 500 INJECTION, POWDER, LYOPHILIZED, FOR SOLUTION INTRAVENOUS at 12:00

## 2021-01-01 ASSESSMENT — ACTIVITIES OF DAILY LIVING (ADL)
ADLS_ACUITY_SCORE: 17
ADLS_ACUITY_SCORE: 17
ADLS_ACUITY_SCORE: 13
ADLS_ACUITY_SCORE: 17
ADLS_ACUITY_SCORE: 13
ADLS_ACUITY_SCORE: 17
ADLS_ACUITY_SCORE: 7
ADLS_ACUITY_SCORE: 17
ADLS_ACUITY_SCORE: 7
ADLS_ACUITY_SCORE: 17
ADLS_ACUITY_SCORE: 19
ADLS_ACUITY_SCORE: 19
ADLS_ACUITY_SCORE: 7
ADLS_ACUITY_SCORE: 11
ADLS_ACUITY_SCORE: 17
ADLS_ACUITY_SCORE: 13
ADLS_ACUITY_SCORE: 7
ADLS_ACUITY_SCORE: 17
ADLS_ACUITY_SCORE: 17
ADLS_ACUITY_SCORE: 19
ADLS_ACUITY_SCORE: 17
ADLS_ACUITY_SCORE: 13
ADLS_ACUITY_SCORE: 19
ADLS_ACUITY_SCORE: 17
ADLS_ACUITY_SCORE: 17
ADLS_ACUITY_SCORE: 11
ADLS_ACUITY_SCORE: 15
ADLS_ACUITY_SCORE: 17
ADLS_ACUITY_SCORE: 15
ADLS_ACUITY_SCORE: 7
ADLS_ACUITY_SCORE: 17
ADLS_ACUITY_SCORE: 17
ADLS_ACUITY_SCORE: 7
ADLS_ACUITY_SCORE: 17
ADLS_ACUITY_SCORE: 13
ADLS_ACUITY_SCORE: 17
ADLS_ACUITY_SCORE: 7
ADLS_ACUITY_SCORE: 17
ADLS_ACUITY_SCORE: 13
ADLS_ACUITY_SCORE: 19
ADLS_ACUITY_SCORE: 19
ADLS_ACUITY_SCORE: 17
ADLS_ACUITY_SCORE: 13
ADLS_ACUITY_SCORE: 13
ADLS_ACUITY_SCORE: 19
ADLS_ACUITY_SCORE: 19
ADLS_ACUITY_SCORE: 17
ADLS_ACUITY_SCORE: 13
ADLS_ACUITY_SCORE: 19
ADLS_ACUITY_SCORE: 19
ADLS_ACUITY_SCORE: 17
ADLS_ACUITY_SCORE: 11
ADLS_ACUITY_SCORE: 17
ADLS_ACUITY_SCORE: 7
ADLS_ACUITY_SCORE: 17
ADLS_ACUITY_SCORE: 17
ADLS_ACUITY_SCORE: 13
ADLS_ACUITY_SCORE: 17
ADLS_ACUITY_SCORE: 7
ADLS_ACUITY_SCORE: 17
ADLS_ACUITY_SCORE: 13
ADLS_ACUITY_SCORE: 17
ADLS_ACUITY_SCORE: 7
ADLS_ACUITY_SCORE: 17
ADLS_ACUITY_SCORE: 17
ADLS_ACUITY_SCORE: 19
ADLS_ACUITY_SCORE: 7
ADLS_ACUITY_SCORE: 17
ADLS_ACUITY_SCORE: 17
ADLS_ACUITY_SCORE: 19
ADLS_ACUITY_SCORE: 17
ADLS_ACUITY_SCORE: 17
ADLS_ACUITY_SCORE: 13
ADLS_ACUITY_SCORE: 17
ADLS_ACUITY_SCORE: 13
ADLS_ACUITY_SCORE: 7
ADLS_ACUITY_SCORE: 17
ADLS_ACUITY_SCORE: 13
ADLS_ACUITY_SCORE: 17
ADLS_ACUITY_SCORE: 19
ADLS_ACUITY_SCORE: 17
ADLS_ACUITY_SCORE: 17
ADLS_ACUITY_SCORE: 13
ADLS_ACUITY_SCORE: 17
ADLS_ACUITY_SCORE: 11
ADLS_ACUITY_SCORE: 17
ADLS_ACUITY_SCORE: 19
ADLS_ACUITY_SCORE: 13
ADLS_ACUITY_SCORE: 13
ADLS_ACUITY_SCORE: 17
ADLS_ACUITY_SCORE: 13
ADLS_ACUITY_SCORE: 17
ADLS_ACUITY_SCORE: 19
ADLS_ACUITY_SCORE: 19
ADLS_ACUITY_SCORE: 17
ADLS_ACUITY_SCORE: 11
ADLS_ACUITY_SCORE: 17
ADLS_ACUITY_SCORE: 17
ADLS_ACUITY_SCORE: 7
ADLS_ACUITY_SCORE: 7
ADLS_ACUITY_SCORE: 19
ADLS_ACUITY_SCORE: 17
ADLS_ACUITY_SCORE: 7
ADLS_ACUITY_SCORE: 17
ADLS_ACUITY_SCORE: 13
ADLS_ACUITY_SCORE: 11
ADLS_ACUITY_SCORE: 7
ADLS_ACUITY_SCORE: 17
ADLS_ACUITY_SCORE: 19
ADLS_ACUITY_SCORE: 17
ADLS_ACUITY_SCORE: 17
ADLS_ACUITY_SCORE: 19
ADLS_ACUITY_SCORE: 17
ADLS_ACUITY_SCORE: 13
ADLS_ACUITY_SCORE: 17
ADLS_ACUITY_SCORE: 13
ADLS_ACUITY_SCORE: 17
ADLS_ACUITY_SCORE: 7
ADLS_ACUITY_SCORE: 11
ADLS_ACUITY_SCORE: 17
ADLS_ACUITY_SCORE: 17
ADLS_ACUITY_SCORE: 13
ADLS_ACUITY_SCORE: 19
ADLS_ACUITY_SCORE: 17
ADLS_ACUITY_SCORE: 13
ADLS_ACUITY_SCORE: 19
ADLS_ACUITY_SCORE: 17
ADLS_ACUITY_SCORE: 7
ADLS_ACUITY_SCORE: 13
ADLS_ACUITY_SCORE: 17
ADLS_ACUITY_SCORE: 17
ADLS_ACUITY_SCORE: 19
ADLS_ACUITY_SCORE: 11
ADLS_ACUITY_SCORE: 17
ADLS_ACUITY_SCORE: 19
ADLS_ACUITY_SCORE: 17
ADLS_ACUITY_SCORE: 13
ADLS_ACUITY_SCORE: 11
ADLS_ACUITY_SCORE: 17
ADLS_ACUITY_SCORE: 11
ADLS_ACUITY_SCORE: 17
ADLS_ACUITY_SCORE: 17
ADLS_ACUITY_SCORE: 19
ADLS_ACUITY_SCORE: 17
ADLS_ACUITY_SCORE: 19
ADLS_ACUITY_SCORE: 17
ADLS_ACUITY_SCORE: 17
ADLS_ACUITY_SCORE: 5
ADLS_ACUITY_SCORE: 13
ADLS_ACUITY_SCORE: 17
ADLS_ACUITY_SCORE: 11
ADLS_ACUITY_SCORE: 17
ADLS_ACUITY_SCORE: 19
ADLS_ACUITY_SCORE: 7
ADLS_ACUITY_SCORE: 17
ADLS_ACUITY_SCORE: 19
ADLS_ACUITY_SCORE: 17
ADLS_ACUITY_SCORE: 19
ADLS_ACUITY_SCORE: 17
ADLS_ACUITY_SCORE: 13
ADLS_ACUITY_SCORE: 7
ADLS_ACUITY_SCORE: 13
ADLS_ACUITY_SCORE: 17
ADLS_ACUITY_SCORE: 17
ADLS_ACUITY_SCORE: 13
ADLS_ACUITY_SCORE: 13
ADLS_ACUITY_SCORE: 17
ADLS_ACUITY_SCORE: 17
ADLS_ACUITY_SCORE: 7
ADLS_ACUITY_SCORE: 17
ADLS_ACUITY_SCORE: 15
ADLS_ACUITY_SCORE: 17
ADLS_ACUITY_SCORE: 17
ADLS_ACUITY_SCORE: 19
ADLS_ACUITY_SCORE: 15
ADLS_ACUITY_SCORE: 13
ADLS_ACUITY_SCORE: 11
ADLS_ACUITY_SCORE: 17
ADLS_ACUITY_SCORE: 19
ADLS_ACUITY_SCORE: 17
ADLS_ACUITY_SCORE: 11
ADLS_ACUITY_SCORE: 17
ADLS_ACUITY_SCORE: 17
ADLS_ACUITY_SCORE: 11
ADLS_ACUITY_SCORE: 17
ADLS_ACUITY_SCORE: 17
ADLS_ACUITY_SCORE: 13
ADLS_ACUITY_SCORE: 13
ADLS_ACUITY_SCORE: 19
ADLS_ACUITY_SCORE: 17
ADLS_ACUITY_SCORE: 17
ADLS_ACUITY_SCORE: 19
ADLS_ACUITY_SCORE: 17
ADLS_ACUITY_SCORE: 13
ADLS_ACUITY_SCORE: 17
ADLS_ACUITY_SCORE: 7
ADLS_ACUITY_SCORE: 17
ADLS_ACUITY_SCORE: 17
ADLS_ACUITY_SCORE: 13
ADLS_ACUITY_SCORE: 13
ADLS_ACUITY_SCORE: 17
ADLS_ACUITY_SCORE: 19
ADLS_ACUITY_SCORE: 17
ADLS_ACUITY_SCORE: 13
ADLS_ACUITY_SCORE: 17
ADLS_ACUITY_SCORE: 11
ADLS_ACUITY_SCORE: 17
ADLS_ACUITY_SCORE: 19
ADLS_ACUITY_SCORE: 17
ADLS_ACUITY_SCORE: 17
ADLS_ACUITY_SCORE: 19
ADLS_ACUITY_SCORE: 19
ADLS_ACUITY_SCORE: 17
ADLS_ACUITY_SCORE: 19
ADLS_ACUITY_SCORE: 17
ADLS_ACUITY_SCORE: 19
ADLS_ACUITY_SCORE: 17
ADLS_ACUITY_SCORE: 17
ADLS_ACUITY_SCORE: 5
ADLS_ACUITY_SCORE: 13
ADLS_ACUITY_SCORE: 17
ADLS_ACUITY_SCORE: 19
ADLS_ACUITY_SCORE: 17
ADLS_ACUITY_SCORE: 11
ADLS_ACUITY_SCORE: 19
ADLS_ACUITY_SCORE: 7
ADLS_ACUITY_SCORE: 19
ADLS_ACUITY_SCORE: 17
ADLS_ACUITY_SCORE: 13
ADLS_ACUITY_SCORE: 17
ADLS_ACUITY_SCORE: 19
ADLS_ACUITY_SCORE: 11
ADLS_ACUITY_SCORE: 7
ADLS_ACUITY_SCORE: 11
ADLS_ACUITY_SCORE: 19
ADLS_ACUITY_SCORE: 17
ADLS_ACUITY_SCORE: 13
ADLS_ACUITY_SCORE: 17
ADLS_ACUITY_SCORE: 11
ADLS_ACUITY_SCORE: 13
ADLS_ACUITY_SCORE: 17
ADLS_ACUITY_SCORE: 13
ADLS_ACUITY_SCORE: 13
ADLS_ACUITY_SCORE: 17
ADLS_ACUITY_SCORE: 11
ADLS_ACUITY_SCORE: 13
ADLS_ACUITY_SCORE: 17
ADLS_ACUITY_SCORE: 19
ADLS_ACUITY_SCORE: 17
ADLS_ACUITY_SCORE: 7
ADLS_ACUITY_SCORE: 17
ADLS_ACUITY_SCORE: 7
ADLS_ACUITY_SCORE: 19
ADLS_ACUITY_SCORE: 17
ADLS_ACUITY_SCORE: 7
ADLS_ACUITY_SCORE: 17
ADLS_ACUITY_SCORE: 17
ADLS_ACUITY_SCORE: 13
ADLS_ACUITY_SCORE: 17
ADLS_ACUITY_SCORE: 17
ADLS_ACUITY_SCORE: 7
ADLS_ACUITY_SCORE: 5
ADLS_ACUITY_SCORE: 13
ADLS_ACUITY_SCORE: 11
ADLS_ACUITY_SCORE: 13
ADLS_ACUITY_SCORE: 17
ADLS_ACUITY_SCORE: 19
ADLS_ACUITY_SCORE: 17
ADLS_ACUITY_SCORE: 13
ADLS_ACUITY_SCORE: 17
ADLS_ACUITY_SCORE: 13
ADLS_ACUITY_SCORE: 17
ADLS_ACUITY_SCORE: 19
ADLS_ACUITY_SCORE: 11
ADLS_ACUITY_SCORE: 19
ADLS_ACUITY_SCORE: 17
ADLS_ACUITY_SCORE: 17
ADLS_ACUITY_SCORE: 13
ADLS_ACUITY_SCORE: 15
ADLS_ACUITY_SCORE: 17
ADLS_ACUITY_SCORE: 17
ADLS_ACUITY_SCORE: 19
ADLS_ACUITY_SCORE: 7
ADLS_ACUITY_SCORE: 17
ADLS_ACUITY_SCORE: 7
ADLS_ACUITY_SCORE: 17
ADLS_ACUITY_SCORE: 19
ADLS_ACUITY_SCORE: 17
ADLS_ACUITY_SCORE: 7
ADLS_ACUITY_SCORE: 17
ADLS_ACUITY_SCORE: 13
ADLS_ACUITY_SCORE: 13
ADLS_ACUITY_SCORE: 19
ADLS_ACUITY_SCORE: 17
ADLS_ACUITY_SCORE: 7
ADLS_ACUITY_SCORE: 13
ADLS_ACUITY_SCORE: 17
ADLS_ACUITY_SCORE: 11
ADLS_ACUITY_SCORE: 17
ADLS_ACUITY_SCORE: 17
ADLS_ACUITY_SCORE: 7
ADLS_ACUITY_SCORE: 17
ADLS_ACUITY_SCORE: 19
ADLS_ACUITY_SCORE: 17
ADLS_ACUITY_SCORE: 19
ADLS_ACUITY_SCORE: 19
ADLS_ACUITY_SCORE: 7
ADLS_ACUITY_SCORE: 17
ADLS_ACUITY_SCORE: 17
ADLS_ACUITY_SCORE: 13
ADLS_ACUITY_SCORE: 19
ADLS_ACUITY_SCORE: 17
ADLS_ACUITY_SCORE: 7
ADLS_ACUITY_SCORE: 17
ADLS_ACUITY_SCORE: 7
ADLS_ACUITY_SCORE: 17
ADLS_ACUITY_SCORE: 7
ADLS_ACUITY_SCORE: 11
ADLS_ACUITY_SCORE: 17
ADLS_ACUITY_SCORE: 19
ADLS_ACUITY_SCORE: 17
ADLS_ACUITY_SCORE: 17
ADLS_ACUITY_SCORE: 13
ADLS_ACUITY_SCORE: 17
ADLS_ACUITY_SCORE: 11
ADLS_ACUITY_SCORE: 19
ADLS_ACUITY_SCORE: 17
ADLS_ACUITY_SCORE: 13
ADLS_ACUITY_SCORE: 5
ADLS_ACUITY_SCORE: 7
ADLS_ACUITY_SCORE: 17

## 2021-01-01 ASSESSMENT — ENCOUNTER SYMPTOMS
COUGH: 0
VOMITING: 0
FATIGUE: 1
PALPITATIONS: 1
DIARRHEA: 0
DIAPHORESIS: 0
FREQUENCY: 0
FEVER: 0
WEAKNESS: 1
NAUSEA: 1
SHORTNESS OF BREATH: 1
HEMATURIA: 0
DYSURIA: 0

## 2021-01-01 ASSESSMENT — MIFFLIN-ST. JEOR: SCORE: 1494.26

## 2021-03-22 NOTE — ED TRIAGE NOTES
Pt complains of  heart palpitations since last Thursday, states she had dental surgery on March 6th.Pt reports generalized weakness since last Thursday.

## 2021-03-22 NOTE — ED PROVIDER NOTES
"  History   Chief Complaint:  Generalized Weakness and Palpitations    HPI   Mavis Whiteside is a 75 year old female, with a history of DM II (not on insulin), who presents to the ED for evaluation of generalized weakness and palpitations. The patient reports she had dental surgery about two weeks ago and was placed on antibiotics for prophylaxis. Since she started taking the medications she knew that she felt \"off\", but did not feel acutely worse shortly after taking them. She states at the end of her course, about four days ago, she began to feel short of breath and some palpitations when she exerted herself. She had not had this in the past. She also endorses some hot flashes and feeling warm intermittently, but had no recorded a fever. She does not have any chest pain or pain with deep inspiration. She denies any cough or diaphoresis. The patient conveys two days ago she then felt more fatigued and weakened, however, when she was packing to go to [place], she felt that she wasn't fully able to load her things into her car, so she stayed home and then came to the emergency department today. The patient has not had any numbness or headache with her symptoms. She denies any vomiting or diarrhea, but has been slightly nauseous. She has not been around any ill contacts that she knows of. She has not had any loss of taste or smell. The patient denies any leg pain or swelling. She was a former smoker. She has no urinary symptoms.     Review of Systems   Constitutional: Positive for fatigue. Negative for diaphoresis and fever.   HENT:        No loss of taste or smell   Respiratory: Positive for shortness of breath. Negative for cough.    Cardiovascular: Positive for palpitations. Negative for chest pain and leg swelling.   Gastrointestinal: Positive for nausea. Negative for diarrhea and vomiting.   Genitourinary: Negative for dysuria, frequency and hematuria.   Neurological: Positive for weakness.   All other systems " "reviewed and are negative.    Allergies:  Penicillins  Prednisone  Sulfa  Tylenol    Medications:    Synthroid    Past Medical History:    DEX  GERD  Mumps  DM II  Hashimoto's thyroiditis    Past Surgical History:    Hysterectomy  Right shoulder arthroplasty  Left cataract removal  Bilateral intraocular lens implantation    Family History:    Lung cancer    Social History:  Smoking status: Former  PCP: Fly Xiao  Presents to the ED alone    Physical Exam     Patient Vitals for the past 24 hrs:   BP Temp Temp src Pulse Resp SpO2 Height Weight   03/22/21 1715 138/79 -- -- 72 15 -- -- --   03/22/21 1709 138/79 -- -- 71 28 -- -- --   03/22/21 1511 130/77 -- -- -- -- -- -- --   03/22/21 1510 -- 97.5  F (36.4  C) Temporal -- -- -- -- --   03/22/21 1509 -- -- -- 69 18 95 % 1.702 m (5' 7\") 96.2 kg (212 lb)       Physical Exam  GENERAL: well developed, pleasant  HEAD: atraumatic  EYES: pupils reactive, extraocular muscles intact, conjunctivae normal  ENT:  mucus membranes moist  NECK:  trachea midline, normal range of motion  RESPIRATORY: no tachypnea, breath sounds clear to auscultation   CVS: normal S1/S2. 2-3/6 systolic murmer, intact distal pulses  ABDOMEN: soft, nontender, nondistention  MUSCULOSKELETAL: no deformities  SKIN: warm and dry, no acute rashes or ulceration  NEURO: GCS 15, cranial nerves intact, alert and oriented x3  PSYCH:  Mood/affect normal    Emergency Department Course   ECG (15:07:57):  Rate 66 bpm. FL interval 184. QRS duration 88. QT/QTc 434/454. P-R-T axes 39 -14 50. Normal sinus rhythm. Possible anterior infarct, age undetermined. Abnormal ECG. Interpreted at 1608 by Tony Dee MD.    Imaging:    XR Chest, portable, 1 view:  No airspace consolidation, pneumothorax, or pleural   effusion. Right shoulder replacement noted.     CT Chest PE with contrast:  1.  No acute abnormality demonstrated in the chest. No evidence of   pulmonary embolism.   2.  Incidental 7 mm pulmonary nodule in the " right upper lobe.     Imaging independently reviewed and agree with radiologist interpretation.    Laboratory:  CBC: WNL (WBC 9.8, HGB 14.7, )    BMP:  (H), o/w WNL (Creatinine 0.90)    Troponin (Collected 1614): <0.015    D-dimer: 0.9 (H)    CRP Inflammation: <2.9    Influenza A/B & SARS-CoV2 (COVID-19) Virus PCR Multiplex: Pending    Emergency Department Course:    Reviewed:  I reviewed the patient's nursing notes, vitals, past available medical records.     Assessments:  1642: I obtained history and examined the patient as noted above.     1908: I rechecked the patient and explained findings. Understands and is amendable to the plan.     Disposition:  The patient was discharged to home.    Impression & Plan    Medical Decision Making:    Patient presents with exertional dyspnea, palpitations and generalized weakness. She is concerned this might be related to her heart more specifically endocarditis. Certainly exertional dyspnea is concerning for possible underlying cardiac disease. Work-up as above is normal. CRP is normal. She does have a murmur but has had this for years. Given the negative work-up seems unlikely to represent endocarditis. Discussed getting a stress test in terms of the exertional dyspnea and the limitations of EKG and troponin in terms of cardiac work-ups. She would like to follow-up with her primary care doctor which I think is appropriate. Also discussed getting a Holter monitor although seemingly unlikely but certainly possible if she has been having palpitations and again she would defer and wait to see her primary. Discussed the pulmonary nodule and need for repeat imaging in the future.    Covid-19  Mavis Whiteside was evaluated during a global COVID-19 pandemic, which necessitated consideration that the patient might be at risk for infection with the SARS-CoV-2 virus that causes COVID-19.   Applicable protocols for evaluation were followed during the patient's care.    COVID-19 was considered as part of the patient's evaluation. The plan for testing is:  a test was obtained during this visit    Diagnosis:    ICD-10-CM    1. Dyspnea on exertion  R06.00      Scribe Disclosure:  IBranden, am serving as a scribe at 4:42 PM on 3/22/2021 to document services personally performed by Tony Dee MD based on my observations and the provider's statements to me.      Tony Dee MD  03/22/21 [id number]

## 2021-11-28 PROBLEM — J96.01 ACUTE RESPIRATORY FAILURE WITH HYPOXIA (H): Status: ACTIVE | Noted: 2021-01-01

## 2021-11-28 PROBLEM — G93.40 ENCEPHALOPATHY: Status: ACTIVE | Noted: 2021-01-01

## 2021-11-28 PROBLEM — J12.82 PNEUMONIA DUE TO 2019 NOVEL CORONAVIRUS: Status: ACTIVE | Noted: 2021-01-01

## 2021-11-28 PROBLEM — U07.1 PNEUMONIA DUE TO 2019 NOVEL CORONAVIRUS: Status: ACTIVE | Noted: 2021-01-01

## 2021-11-28 PROBLEM — J18.9 PNEUMONIA OF RIGHT LOWER LOBE DUE TO INFECTIOUS ORGANISM: Status: ACTIVE | Noted: 2021-01-01

## 2021-11-28 NOTE — ED TRIAGE NOTES
Pt BIBA from home. Patient reports feeling generally ill for the last couple weeks. Patient had a COVID test today that was positive. Temp of 100.4F for EMS and not eating much. Pt's initial oxygen saturation was in upper 80s, arrived on oxygen. Glucose for EMS: 112. Patient not vaccinated. Pt reports diarrhea. Patient slow to answer questions and unable to answer some at all. Unsure of patient's normal baseline for mentation.

## 2021-11-28 NOTE — ED NOTES
Bed: ED29  Expected date: 11/28/21  Expected time: 3:39 PM  Means of arrival: Ambulance  Comments:  [facility] 26f [facility]ott, covid ETA [id number]

## 2021-11-28 NOTE — ED PROVIDER NOTES
"  History   Chief Complaint:  Generalized Weakness and Covid Concern     The history is provided by the patient.      Mavis Whiteside is a 76 year old female with history of type 2 diabetes and Hashimoto's thyroiditis who presents with covid concerns. She had a positive COVID test today at home.  She reports about 2 weeks of symptoms.  She has been experiencing fatigue and slept constantly. She reports a terrible cough but didn't notice any shortness of breath at home.  She has been nauseated but no vomiting.  She has had some diarrhea. She feels foggy like \"COVID took my brain\" and describes a fog where she cannot concentrate and it makes it difficult to answer questions during the history.  She is not vaccinated against COVID. She did not receive any monoclonal antibodies.  She is not normally on oxygen at home.  Per EMS she was in the mid 80s on room air.  On arrival she was in the mid to high 80s on room air and is now on 3 L of oxygen by nasal cannula.    Review of Systems  As noted per HPI.  Remainder of a 10 point review of systems was negative.    Allergies:  Gluten meal  Lactose  Mold  Prednisone  Acetaminophen  Caffeine  Penicillins  Sulfa drugs    Medications:  Synthroid  Claritin    Past Medical History:     Rheumatic aortic stenosis  Pulmonary nodules  Type 2 diabetes  GERD  Hashimoto's thyroiditis  Hypothyroidism    Past Surgical History:    Hysterectomy    Family History:    Mother & Father - lung cancer    Social History:  Presents alone via EMS    Physical Exam     Patient Vitals for the past 24 hrs:   BP Temp Temp src Pulse Resp SpO2   11/28/21 1731 -- 99.8  F (37.7  C) Oral 76 22 92 %   11/28/21 1627 -- -- -- 82 18 94 %   11/28/21 1605 -- -- -- -- 18 --   11/28/21 1603 124/68 -- -- 87 -- --   11/28/21 1602 -- -- -- -- -- (!) 88 %   11/28/21 1601 -- (!) 102.2  F (39  C) Oral -- -- (!) 88 %       Physical Exam  General: Well-nourished, appears to be uncomfortable when I enter the room, warm to " touch  Eyes: PERRL, conjunctivae pink no scleral icterus or conjunctival injection  ENT:  Moist mucus membranes, posterior oropharynx mildly erythematous without exudates or edema  Respiratory:  Normal work of breathing. Speaking in complete sentences. No accessory muscle use.  +Frequent cough  CV: Normal rate, regular pulse  GI:  Abdomen soft and non-distended.  No tenderness, guarding or rebound  Skin: Warm, dry.  No rashes or petechiae  Musculoskeletal: No peripheral edema or calf tenderness  Neuro: Alert and oriented to person/place/time.  Neck supple. Seems easily distracted and confused at times.  Psychiatric: Odd affect    Emergency Department Course   Imaging:  Head CT w/o contrast   Final Result   IMPRESSION:   1.  No acute intracranial hemorrhage, mass effect, or CT evidence for acute infarction.   2.  Mild global brain parenchymal volume loss with presumed sequelae of mild chronic small vessel ischemic disease.      CT Chest Pulmonary Embolism w Contrast   Final Result   IMPRESSION:   1.  Multifocal ground-glass opacities suspicious for COVID-19. There is, however, more focal consolidation in the perihilar aspect of the right lower lobe that may represent superimposed bacterial pneumonia.   2.  No evidence of pulmonary embolism.         XR Chest Port 1 View   Final Result   IMPRESSION: There is abnormal perihilar airspace opacity in the medial right lung concerning for developing right middle lobe pneumonia. Left lung is clear. No pneumothorax or pleural effusion.        Report per radiology    Laboratory:  CBC: WBC: 5.9, HGB: 14.9, PLT: 170  CMP: Glucose 111 (H), Sodium: 132 (L), GFR: 52 (L), Calcium: 8.0 (L), Albumin: 3.0 (L), ALT: 60 (H), AST: 104 (H), Creatinine: 1.05 (H) o/w WNL     iStat Gases (lactate): Lactic 1.1, Bicarb 25, O2 Sat 28 (L), pCO2 46, pH 7.33, pO2 20 (L)    Symptomatic Influenza A/B & COVID PCR: Positive for Covid-19, Negative for Influenza A/B    Troponin (Collected 1617):  <0.015  TNIH (Collected 1617): 20    D-dimer: 2.52 (H)    CRP inflammation: 30.0 (H)    Magnesium: 2.2  Phosphorus: 3.1    Lactase Dehydrogenase: 365 (H)    Ferritin: 2,168 (H)  Procalcitonin: 0.08 (H)    Blood Cultures x2: Pending    Procedures  None    Emergency Department Course:  Reviewed:  I reviewed nursing notes, vitals, past medical history and Care Everywhere    Assessments:  1629 I obtained history and examined the patient as noted above.   1820 I rechecked the patient and explained findings.     Consults:  1803 I spoke with Dr. Camarena, of the hospital service, regarding admission.     Interventions:  1618 ibuprofen, 600 mg, Oral  1819 Rocephin, 2 g, IV  1820 Decadron, 8 mg, IV    Disposition:  The patient was admitted to the hospital under the care of Dr. Camarena.     Impression & Plan   Medical Decision Making:  Mavis Whiteside is a 76 year old female who comes today with hypoxia and encephalopathy.  She is febrile with a cough and is Covid positive.  Unfortunately, she is not vaccinated.  Unfortunately, she did not get tested and has not received monoclonal antibodies.  She is doing okay on oxygen and has not required BiPAP or high flow nasal cannula but she will need to be admitted for ongoing respiratory support and nursing cares.  She was treated with dexamethasone.  CT scan was obtained given the elevated D-dimer and the risk of thrombosis with Covid.  Fortunately, no signs of DVT but it did show what looks like more consolidation in the right lower lobe that would be expected and so we will also treat her with antibiotics for possible superinfection and community-acquired pneumonia.  In terms of her encephalopathy, she has no focal neurologic deficits to suggest a stroke.  Her head CT shows no significant abnormalities that would account for her symptoms.  It may be secondary to hypoxia or infectious encephalopathy.  She will be admitted to the hospital service under the care of Dr. Camarena.  She  was in agreement with the plan.    Covid-19  Mavis Whiteside was evaluated during a global COVID-19 pandemic, which necessitated consideration that the patient might be at risk for infection with the SARS-CoV-2 virus that causes COVID-19.   Applicable protocols for evaluation were followed during the patient's care.   COVID-19 was considered as part of the patient's evaluation. The plan for testing is:  a test was obtained during this visit.    Diagnosis:    ICD-10-CM    1. Pneumonia due to 2019 novel coronavirus  U07.1     J12.82    2. Pneumonia of right lower lobe due to infectious organism  J18.9    3. Encephalopathy  G93.40    4. Acute respiratory failure with hypoxia (H)  J96.01        Scribe Disclosure:  Saeed PATTON, am serving as a scribe at 3:56 PM on 11/28/2021 to document services personally performed by Alda Cueva MD based on my observations and the provider's statements to me.            Alda Cueva MD  11/28/21 3656

## 2021-11-29 NOTE — PROGRESS NOTES
[facility]    Medicine Progress Note - Hospitalist Service       Date of Admission:  11/28/2021    Assessment & Plan         Mavis Whiteside is a 76 year old female admitted on 11/28/2021.  Past history of DM II, hypothyroidism, DEX, moderate aortic stenosis due to rheumatic disease, unvaccinated against COVID who presents with cough and fatigue, found to have COVID-19 pneumonia.     Sepsis and acute hypoxic respiratory failure due to COVID-19 pneumonia  Possible bacterial superinfection  Presents with 2 weeks of multiple COVID symptoms, most notably productive cough with fever (102), tachypnea (22), hypoxia (80's on room air) and CT demonstrating patchy bilateral infiltrates as well as more dense consolidation of right lower lobe.  Procal low at 0.08, no leukocytosis.   On 4L nasal canula.  - continue dexamethasone, remdesivir  - continue ceftriaxone and azithro  - follow covid labs and blood cultures  - wean oxygen as able  - lovenox DVT ppx  -add albuterol      Acute infectious encephalopathy  Reporting forgetfulness over the past 2 weeks, some trouble tracking conversations (was noted to be easily distractible during interview).  Admission CT head negative for acute pathology, neuro exam non-focal and oriented x4.  Likely related to COVID.   - monitor     Hyponatremia  Admission Na 130.   Improving, monitor      DEX  -hold CPAP in setting of COVID     DM II, diet controlled  - low dose ssi  - monitor POC glucose with steroids     Hypothyroidism  - continue PTA levothyroxine      Moderate aortic stenosis due to rheumatic fever     Diet: Combination Diet Regular Diet Adult    DVT Prophylaxis: Enoxaparin (Lovenox) SQ  Ivey Catheter: Not present  Central Lines: None  Code Status: Full Code      Disposition Plan   Expected discharge: 12/05/2021   recommended to prior living arrangement once SIRS/Sepsis treated.     The patient's care was discussed with the Patient.    Vasyl Joe,  MD  Hospitalist Service  [facility]  Securely message with the [employer] Web Console (learn more here)  Text page via [employer] Paging/Directory        Clinically Significant Risk Factors Present on Admission                   ______________________________________________________________________    Interval History   Feels a little short of breath.  Has a non-productive cough.    Data reviewed today: I reviewed all medications, new labs and imaging results over the last 24 hours. I personally reviewed no images or EKG's today.    Physical Exam   Vital Signs: Temp: 98.1  F (36.7  C) Temp src: Oral BP: 128/65 Pulse: 65   Resp: 16 SpO2: 92 % O2 Device: Nasal cannula Oxygen Delivery: 2 LPM  Weight: 226 lbs 13.65 oz  Constitutional: awake, alert, cooperative, no apparent distress  Respiratory: No increased work of breathing, good air exchange, clear to auscultation bilaterally, no crackles or wheezing  Cardiovascular: regular rate and rhythm, normal S1 and S2, no S3 or S4, and no murmur noted  GI: normal bowel sounds, soft, non-distended, non-tender  Skin: no rashes  Neuropsychiatric: General: normal, calm and normal eye contact    Data   Recent Labs   Lab 11/29/21  0824 11/29/21  0751 11/29/21  0237 11/28/21  2256 11/28/21  1617   WBC  --  3.8*  --   --  5.9   HGB  --  16.1*  --   --  14.9   MCV  --  93  --   --  88   PLT  --  156  --   --  170   INR  --   --   --   --  0.97   NA  --  135  --   --  132*   POTASSIUM  --  4.3  --   --  3.9   CHLORIDE  --  107  --   --  100   CO2  --  21  --   --  25   BUN  --  30  --   --  29   CR  --  0.86  --   --  1.05*   ANIONGAP  --  7  --   --  7   LENEA  --  8.5  --   --  8.0*   * 178* 165*   < > 111*   ALBUMIN  --   --   --   --  3.0*   PROTTOTAL  --   --   --   --  7.6   BILITOTAL  --   --   --   --  0.3   ALKPHOS  --   --   --   --  58   ALT  --   --   --   --  60*   AST  --   --   --   --  104*   TROPONIN  --   --   --   --  <0.015    < > = values in  this interval not displayed.     Recent Results (from the past 24 hour(s))   XR Chest Port 1 View    Narrative    EXAM: XR CHEST PORT 1 VIEW  LOCATION: [facility]  DATE/TIME: 11/28/2021 4:45 PM    INDICATION: Dyspnea/SOB.  COMPARISON: 03/22/2021.      Impression    IMPRESSION: There is abnormal perihilar airspace opacity in the medial right lung concerning for developing right middle lobe pneumonia. Left lung is clear. No pneumothorax or pleural effusion.   CT Chest Pulmonary Embolism w Contrast    Narrative    EXAM: CT CHEST PULMONARY EMBOLISM W CONTRAST  LOCATION: [facility]  DATE/TIME: 11/28/2021 4:58 PM    INDICATION: PE suspected, low/intermediate probability, positive D-dimer.    COMPARISON: 3/22/2021.    TECHNIQUE: CT chest pulmonary angiogram during arterial phase injection of IV contrast. Multiplanar reformats and MIP reconstructions were performed. Dose reduction techniques were used.     CONTRAST: 76 mL Isovue 370.    FINDINGS:  ANGIOGRAM CHEST: Pulmonary arteries are normal caliber and negative for pulmonary emboli. Thoracic aorta is negative for dissection.    LUNGS AND PLEURA: Multifocal ground-glass and air-space opacities are noted in the lungs, greatest in the perihilar right lower lobe. No pleural effusion or pneumothorax.    MEDIASTINUM/AXILLAE: Calcified mediastinal and right hilar lymph nodes compatible with previous granulomatous disease. Normal caliber thoracic aorta.    CORONARY ARTERY CALCIFICATION: None.    UPPER ABDOMEN: Normal.    MUSCULOSKELETAL: Right shoulder replacement. No destructive bone lesions.      Impression    IMPRESSION:  1.  Multifocal ground-glass opacities suspicious for COVID-19. There is, however, more focal consolidation in the perihilar aspect of the right lower lobe that may represent superimposed bacterial pneumonia.  2.  No evidence of pulmonary embolism.     Head CT w/o contrast    Narrative    EXAM: CT HEAD W/O  CONTRAST  LOCATION: [facility]  DATE/TIME: 11/28/2021 4:59 PM    INDICATION: Mental status change, unknown cause  COMPARISON: None.  TECHNIQUE: Routine CT Head without IV contrast. Multiplanar reformats. Dose reduction techniques were used.    FINDINGS:  INTRACRANIAL CONTENTS: No intracranial hemorrhage, extraaxial collection, or mass effect.  No CT evidence of acute infarct. Mild presumed chronic small vessel ischemic changes. Mild generalized volume loss. No hydrocephalus.     VISUALIZED ORBITS/SINUSES/MASTOIDS: Prior bilateral cataract surgery. Visualized portions of the orbits are otherwise unremarkable. No paranasal sinus mucosal disease. No middle ear or mastoid effusion.    BONES/SOFT TISSUES: No acute abnormality.        Impression    IMPRESSION:  1.  No acute intracranial hemorrhage, mass effect, or CT evidence for acute infarction.  2.  Mild global brain parenchymal volume loss with presumed sequelae of mild chronic small vessel ischemic disease.     Medications     - MEDICATION INSTRUCTIONS -         remdesivir  100 mg Intravenous Q24H    And     sodium chloride 0.9%  50 mL Intravenous Q24H     albuterol  2 puff Inhalation 4x Daily     azithromycin  250 mg Oral Daily     cefTRIAXone  2 g Intravenous Q24H     dexamethasone  6 mg Oral Daily     enoxaparin ANTICOAGULANT  40 mg Subcutaneous Q24H     insulin aspart  1-3 Units Subcutaneous TID AC     insulin aspart  1-3 Units Subcutaneous At Bedtime     levothyroxine  112 mcg Oral Daily     sodium chloride (PF)  3 mL Intracatheter Q8H

## 2021-11-29 NOTE — PHARMACY-ADMISSION MEDICATION HISTORY
Pharmacy Medication History  Admission medication history interview status for the 11/28/2021  admission is complete. See EPIC admission navigator for prior to admission medications     Location of Interview: Outside patient room but on unit  Medication history sources: Surescripts and Chart Review    Significant changes made to the medication list:  ADDED: levothyroxine    In the past week, patient estimated taking medication this percent of the time: 50-90% due to illness    Additional medication history information:   Per chart review, the only Rx pt is taking is levothyroxine. Unable to verify last dose with pt at this time due to current illness.    Medication reconciliation completed by provider prior to medication history? No    Time spent in this activity: 10 minutes    Prior to Admission medications    Medication Sig Last Dose Taking? Auth Provider   levothyroxine (SYNTHROID/LEVOTHROID) 112 MCG tablet Take 112 mcg by mouth daily  Yes Unknown, Entered By History       The information provided in this note is only as accurate as the sources available at the time of update(s)

## 2021-11-29 NOTE — PLAN OF CARE
A&Ox4 but forgetful, VSS on 4L o2. SBA, voiding adequately with purewick. Special precautions maintained. Has intermittent productive cough. Discharge pending.

## 2021-11-29 NOTE — PLAN OF CARE
Neuro- A&O  Most Recent Vitals- Temp: 97.6  F (36.4  C) Temp src: Oral BP: (!) 149/88 Pulse: 61   Resp: 16 SpO2: 97 % O2 Device: None (Room air)   Tele/Cardiac- NA  Resp- Ra  Activity- SBA  Pain- denies  Drips- NA  Drains/Tubes- external catheter   Skin- WNL  GI/- occasional diarrhea  Aggression Color- Green  COVID status- Positive  Plan- TBD  Misc- Patient arrived from ED with positive Covid result. Patient A&O forgetful up with SBA LS diminish BL with frequent non productive cough. PT has no fever c/o generalized weakness started on remdesivir.    Cee Duran RN

## 2021-11-29 NOTE — H&P
[facility]    History and Physical - Hospitalist Service       Date of Admission:  11/28/2021    Assessment & Plan      Mavis Whiteside is a 76 year old female admitted on 11/28/2021.  Past history of DM II, hypothyroidism, DEX, moderate aortic stenosis due to rheumatic disease, unvaccinated against COVID who presents with cough and fatigue, found to have COVID-19 pneumonia.      Sepsis and acute hypoxic respiratory failure due to COVID-19 pneumonia  Possible bacterial superinfection  Presents with 2 weeks of multiple COVID symptoms, most notably productive cough with fever (102), tachypnea (22), hypoxia (80's on room air) and CT demonstrating patchy bilateral infiltrates as well as more dense consolidation of right lower lobe.  Procal low at 0.08, no leukocytosis.   - continue dexamethasone  - start remdesivir  - continue ceftriaxone and azithro  - follow covid labs and blood cultures  - wean oxygen as able  - lovenox DVT ppx    Acute infectious encephalopathy  Reporting forgetfulness over the past 2 weeks, some trouble tracking conversations (was noted to be easily distractible during interview).  Admission CT head negative for acute pathology, neuro exam non-focal and oriented x4.  Likely related to COVID.   - monitor    Hyponatremia  Admission Na 130.   - will hold on fluids, will be receiving some saline with antibiotics and remdesivir  - BMP in AM    DEX  - hold CPAP in setting of COVID    DM II, diet controlled  - low dose ssi  - monitor POC glucose with steroids    Hypothyroidism  - continue PTA levothyroxine     Moderate aortic stenosis due to rheumatic fever    Hypocalcemia   Admission Ca 8.0       Diet: Regular Diet Adult    DVT Prophylaxis: Enoxaparin (Lovenox) SQ  Ivey Catheter: Not present  Central Lines: None  Code Status:   Full code per patient     Clinically Significant Risk Factors Present on Admission         # Hyponatremia: Na = 132 mmol/L (Ref range: 133 - 144 mmol/L)  on admission, will monitor as appropriate           Disposition Plan   Expected discharge:  3 days recommended to prior living arrangement once weaned from oxygen .     The patient's care was discussed with the Patient.    Maximilian Camarena MD  [facility]  Securely message with the Vocera Web Console (learn more here)  Text page via [email or web address] Paging/Directory        ______________________________________________________________________    Chief Complaint   Forgetfulness    History is obtained from the patient, chart review and discussion with ED provider.     History of Present Illness   Mavis Whiteside is a 76 year old female who presents with primary complaint of forgetfulness.  She reports at baseline she is a very poor sleeper but about 2 weeks ago started sleeping up to 15 hours per day and reports there were 2 days where she essentially slept the entire day through.  She has noted intermittent chills as well as fever to 102.  She experienced a productive cough, though feels that this has now been improving over the past 5 or so days and is no longer productive.  She has also been experiencing myalgias, athralgias and has had diarrhea any time she eats.  She reports a headache today, denies any sore throat.      Her biggest concern was that she has been very forgetful over the past 2 weeks.  She reports ordering pies for Thanksgiving but then forgetting to pick them up because she mixed up her dates.  She has had trouble tracking conversation and has been forgetting what she intended to say.  Her niece and daughter visited her today and were concerned about stroke so sent her to the ED for evaluation.     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  She reports she is unvaccinated due to [employer] objections of embryonic stem  cell use.     Review of Systems    The 10 point Review of Systems is negative other than noted in the HPI or here.     Past Medical History    I have reviewed this patient's medical history and updated it with pertinent information if needed.   Past Medical History:   Diagnosis Date     Hypothyroidism     Hashimoto's thyroiditis     Moderate aortic stenosis     due to rheumatic disease     DEX (obstructive sleep apnea)      Type 2 diabetes mellitus without complication, without long-term current use of insulin (H)     diet controlled       Past Surgical History   I have reviewed this patient's surgical history and updated it with pertinent information if needed.  Past Surgical History:   Procedure Laterality Date     CATARACT EXTRACTION       HYSTERECTOMY       TOTAL SHOULDER ARTHROPLASTY Right        Social History   I have reviewed this patient's social history and updated it with pertinent information if needed.  Social History     Tobacco Use     Smoking status: Former Smoker     Quit date:      Years since quittin.9     Smokeless tobacco: Never Used   Substance Use Topics     Alcohol use: Never     Drug use: Never       Family History   I have reviewed this patient's family history and updated it with pertinent information if needed.  Family History   Problem Relation Age of Onset     Lung Cancer Mother      Lung Cancer Father          Prior to Admission Medications   Prior to Admission Medications   Prescriptions Last Dose Informant Patient Reported? Taking?   levothyroxine (SYNTHROID/LEVOTHROID) 112 MCG tablet   Yes Yes   Sig: Take 112 mcg by mouth daily      Facility-Administered Medications: None     Allergies   Allergies   Allergen Reactions     Gluten Meal GI Disturbance     Lactose GI Disturbance and Other (See Comments)     Dairy Products - brain fog     Mold Cough     Cough, sneeze, pneumonia     Prednisone Nausea and Vomiting     Acetaminophen Other (See Comments) and Rash     pt states she  can take acetaminophen     Caffeine Palpitations and Other (See Comments)     Penicillins Other (See Comments) and Rash     Sulfa Drugs Other (See Comments) and Rash       Physical Exam   Vital Signs: Temp: 99.8  F (37.7  C) Temp src: Oral BP: 124/68 Pulse: 76   Resp: 22 SpO2: 92 % O2 Device: Nasal cannula Oxygen Delivery: 4 LPM  Weight: 0 lbs 0 oz    General Appearance: Well-nourished female no acute distress  Eyes: Pupils equal, round reactive to light, sclera anicteric  HEENT: Mucous membranes moist, no neck lymphadenopathy  Respiratory: Crackles bilaterally in middle and lower lung fields, no wheezing or tachypnea  Cardiovascular: Regular rate and rhythm, normal S1/S2, no murmurs, rubs or gallops  GI: Abdomen soft, nontender, nondistended, normal bowel sounds  Lymph/Hematologic: No peripheral edema  Skin: No rash or bruising  Musculoskeletal: Extremities warm and well-perfused  Neurologic: Alert and appropriate, cranial nerves II through XII intact, oriented x3, 5 of 5 strength in all extremities, sensation to light touch intact, coordination intact, no pronator drift  Psychiatric: Normal affect    Data   Data reviewed today: I reviewed all medications, new labs and imaging results over the last 24 hours. I personally reviewed the chest CT image(s) showing Bilateral patchy infiltrates with area of more dense consolidation of right lower lobe.    Recent Labs   Lab 11/28/21  1617   WBC 5.9   HGB 14.9   MCV 88      *   POTASSIUM 3.9   CHLORIDE 100   CO2 25   BUN 29   CR 1.05*   ANIONGAP 7   LEENA 8.0*   *   ALBUMIN 3.0*   PROTTOTAL 7.6   BILITOTAL 0.3   ALKPHOS 58   ALT 60*   *   TROPONIN <0.015     Recent Results (from the past 24 hour(s))   XR Chest Port 1 View    Narrative    EXAM: XR CHEST PORT 1 VIEW  LOCATION: [facility]  DATE/TIME: 11/28/2021 4:45 PM    INDICATION: Dyspnea/SOB.  COMPARISON: 03/22/2021.      Impression    IMPRESSION: There is abnormal  perihilar airspace opacity in the medial right lung concerning for developing right middle lobe pneumonia. Left lung is clear. No pneumothorax or pleural effusion.   CT Chest Pulmonary Embolism w Contrast    Narrative    EXAM: CT CHEST PULMONARY EMBOLISM W CONTRAST  LOCATION: [facility]  DATE/TIME: 11/28/2021 4:58 PM    INDICATION: PE suspected, low/intermediate probability, positive D-dimer.    COMPARISON: 3/22/2021.    TECHNIQUE: CT chest pulmonary angiogram during arterial phase injection of IV contrast. Multiplanar reformats and MIP reconstructions were performed. Dose reduction techniques were used.     CONTRAST: 76 mL Isovue 370.    FINDINGS:  ANGIOGRAM CHEST: Pulmonary arteries are normal caliber and negative for pulmonary emboli. Thoracic aorta is negative for dissection.    LUNGS AND PLEURA: Multifocal ground-glass and air-space opacities are noted in the lungs, greatest in the perihilar right lower lobe. No pleural effusion or pneumothorax.    MEDIASTINUM/AXILLAE: Calcified mediastinal and right hilar lymph nodes compatible with previous granulomatous disease. Normal caliber thoracic aorta.    CORONARY ARTERY CALCIFICATION: None.    UPPER ABDOMEN: Normal.    MUSCULOSKELETAL: Right shoulder replacement. No destructive bone lesions.      Impression    IMPRESSION:  1.  Multifocal ground-glass opacities suspicious for COVID-19. There is, however, more focal consolidation in the perihilar aspect of the right lower lobe that may represent superimposed bacterial pneumonia.  2.  No evidence of pulmonary embolism.     Head CT w/o contrast    Narrative    EXAM: CT HEAD W/O CONTRAST  LOCATION: [facility]  DATE/TIME: 11/28/2021 4:59 PM    INDICATION: Mental status change, unknown cause  COMPARISON: None.  TECHNIQUE: Routine CT Head without IV contrast. Multiplanar reformats. Dose reduction techniques were used.    FINDINGS:  INTRACRANIAL CONTENTS: No intracranial  hemorrhage, extraaxial collection, or mass effect.  No CT evidence of acute infarct. Mild presumed chronic small vessel ischemic changes. Mild generalized volume loss. No hydrocephalus.     VISUALIZED ORBITS/SINUSES/MASTOIDS: Prior bilateral cataract surgery. Visualized portions of the orbits are otherwise unremarkable. No paranasal sinus mucosal disease. No middle ear or mastoid effusion.    BONES/SOFT TISSUES: No acute abnormality.        Impression    IMPRESSION:  1.  No acute intracranial hemorrhage, mass effect, or CT evidence for acute infarction.  2.  Mild global brain parenchymal volume loss with presumed sequelae of mild chronic small vessel ischemic disease.

## 2021-11-29 NOTE — PROGRESS NOTES
RECEIVING UNIT ED HANDOFF REVIEW    ED Nurse Handoff Report was reviewed by: Fredis Mendez RN on November 28, 2021 at 9:53 PM

## 2021-11-30 NOTE — PROVIDER NOTIFICATION
"Dr. Joe paged \" May we please have orders for IV ABX vs PO for this patient? She is newly on BiPap. TY\"  "

## 2021-11-30 NOTE — PROGRESS NOTES
The patient is transferring to station 33 for IMC, after being placed on BiPap. Fly CINTRON Has received report for the patient.

## 2021-11-30 NOTE — PROGRESS NOTES
"A&Ox4, VSS on 5L NC O2 89% to low 90s. Pleasant but not cooperative with most cares. Continuously removes pulse ox and turns off monitor. \"sick of the beeping noises\", \"unable to get rest\". Turned off IV medication mid administration because IV was beeping. BG checks, refusing insulin. SBA, reg diet - poor appetite, voiding adequately with purewick. Special precautions maintained. Has frequent productive cough. Discharge pending.  "

## 2021-11-30 NOTE — PROGRESS NOTES
[facility]    Medicine Progress Note - Hospitalist Service       Date of Admission:  11/28/2021    Assessment & Plan       Mavis Whiteside is a 76 year old female admitted on 11/28/2021.  Past history of DM II, hypothyroidism, DEX, moderate aortic stenosis due to rheumatic disease, unvaccinated against COVID who presented with cough and fatigue, found to have COVID-19 pneumonia.     Sepsis and acute hypoxic respiratory failure due to COVID-19 pneumonia  Possible bacterial superinfection  Presents with 2 weeks of multiple COVID symptoms, most notably productive cough with fever (102), tachypnea (22), hypoxia (80's on room air) and CT demonstrating patchy bilateral infiltrates as well as more dense consolidation of right lower lobe.  Procal low at 0.08, no leukocytosis.   On 4L nasal canula yesterday but worsened overnight.  She is on BiLevel positive airway pressure and high flow oxygen.  She is febrile but pro-calcitonin is negative.  C-reactive protein 23 and d-dimer is stable.  Lactic acid 1.3.    Continue BiLevel positive airway pressure and oxygen     Continue dexamethasone, remdesivir    Continue ceftriaxone and azithro    Continue albuterol     Follow covid labs and blood cultures    Wean oxygen as able    Lovenox DVT prophylaxis      Acute infectious encephalopathy  Reporting forgetfulness over the past 2 weeks, some trouble tracking conversations (was noted to be easily distractible during interview).  Admission CT head negative for acute pathology, neuro exam non-focal and oriented x4.  Likely related to COVID.     Monitor     Hyponatremia  Admission Na 130, now 134    Improving, monitor      DEX    Now on BiLevel positive airway pressure      DM II, diet controlled    Continue glucometer's and correction scale      Hypothyroidism    Continue PTA levothyroxine but change to intravenous      Moderate aortic stenosis due to rheumatic fever     Diet: NPO for Medical/Clinical Reasons  Except for: Meds    DVT Prophylaxis: Enoxaparin (Lovenox) SQ  Ivey Catheter: Not present  Central Lines: None  Code Status: Full Code      Disposition Plan   Expected discharge: 12/05/2021   recommended to prior living arrangement once SIRS/Sepsis treated.     The patient's care was discussed with the Patient.    Vasyl Joe MD  Hospitalist Service  [facility]  Securely message with the Vocera Web Console (learn more here)  Text page via [employer] Paging/Directory        Clinically Significant Risk Factors Present on Admission                ______________________________________________________________________    Interval History   Comfortable on BiLevel positive airway pressure. History difficult.    Data reviewed today: I reviewed all medications, new labs and imaging results over the last 24 hours. I personally reviewed no images or EKG's today.    Physical Exam   Vital Signs: Temp: (!) 101.3  F (38.5  C) Temp src: Axillary BP: 124/76 Pulse: 70   Resp: 21 SpO2: 96 % O2 Device: BiPAP/CPAP Oxygen Delivery: 60 LPM  Weight: 226 lbs 13.65 oz  Constitutional: awake, alert, cooperative, no apparent distress. BiLevel positive airway pressure mask on.  Respiratory: No increased work of breathing, good air exchange, clear to auscultation bilaterally, no crackles or wheezing  Cardiovascular: regular rate and rhythm, normal S1 and S2, no S3 or S4, and no murmur noted  GI: normal bowel sounds, soft, non-distended, non-tender  Skin: no rashes  Neuropsychiatric: General: normal, calm and normal eye contact    Data   Recent Labs   Lab 11/30/21  1126 11/30/21  0850 11/30/21  0807 11/29/21  0824 11/29/21  0751 11/28/21  2256 11/28/21  1617   WBC  --  6.9  --   --  3.8*  --  5.9   HGB  --  14.3  --   --  16.1*  --  14.9   MCV  --  87  --   --  93  --  88   PLT  --  191  --   --  156  --  170   INR  --   --   --   --   --   --  0.97   NA  --  134  --   --  135  --  132*   POTASSIUM  --  4.3  --    --  4.3  --  3.9   CHLORIDE  --  104  --   --  107  --  100   CO2  --  25  --   --  21  --  25   BUN  --  27  --   --  30  --  29   CR  --  1.07*  --   --  0.86  --  1.05*   ANIONGAP  --  5  --   --  7  --  7   LEENA  --  8.4*  --   --  8.5  --  8.0*   * 113* 99   < > 178*   < > 111*   ALBUMIN  --   --   --   --   --   --  3.0*   PROTTOTAL  --   --   --   --   --   --  7.6   BILITOTAL  --   --   --   --   --   --  0.3   ALKPHOS  --   --   --   --   --   --  58   ALT  --   --   --   --   --   --  60*   AST  --   --   --   --   --   --  104*   TROPONIN  --   --   --   --   --   --  <0.015    < > = values in this interval not displayed.     Recent Results (from the past 24 hour(s))   XR Chest Port 1 View    Narrative    EXAM: XR CHEST PORT 1 VIEW  LOCATION: [facility]  DATE/TIME: 11/30/2021 5:30 AM    INDICATION: Rapid increased O2 needs, known COVID-19.  COMPARISON: 11/28/2021.    FINDINGS: The heart is at the upper limits of normal in size. There is no pulmonary edema. There is increasing infiltrate in the right mid and lower lung. Mild left basilar infiltrate. No pneumothorax. Right shoulder arthroplasty.      Impression    IMPRESSION: Increasing bilateral pneumonia.                 Medications     - MEDICATION INSTRUCTIONS -       - MEDICATION INSTRUCTIONS -       - MEDICATION INSTRUCTIONS -         remdesivir  100 mg Intravenous Q24H    And     sodium chloride 0.9%  50 mL Intravenous Q24H     albuterol  2 puff Inhalation 4x Daily     azithromycin  250 mg Intravenous Q24H     cefTRIAXone  2 g Intravenous Q24H     dexamethasone  6 mg Intravenous Q24H     enoxaparin ANTICOAGULANT  40 mg Subcutaneous Q24H     insulin aspart  1-3 Units Subcutaneous TID AC     insulin aspart  1-3 Units Subcutaneous At Bedtime     [Held by provider] levothyroxine  112 mcg Oral Daily     levothyroxine  84 mcg Intravenous Daily     sodium chloride (PF)  3 mL Intracatheter Q8H

## 2021-11-30 NOTE — PROGRESS NOTES
BRIEF HOUSE OFFICER NOTE:    Acute Hypoxic Respiratory Failure, worsening  I was on the unit for a different RRT when staff were discussing having difficulty maintaining patient's O2 saturations. She was on 4L via NC with O2 saturations consistently 88-89%. The plan was to switch the patient to a face mask but her O2 saturations were even lower. I was notified her O2 saturations were in the 80s on 15L oxymask and slowly recovering. From the doorway the patient does not appear distressed but does look slightly cyanotic.     INTERVENTIONS:  -Initiate HiFlo NC  -ABG to assess level of hypoxia; P/F ratio    ADDENDUM:  Initially improved on HiFlo NC but O2 saturations back to 80s. Transition to bipap. Repeat imaging. Transfer to [facility]    -CXR now  -Bipap 14/8  -Transfer to [facility]    KEYONA Farah CNP  Text Page

## 2021-11-30 NOTE — PROGRESS NOTES
Summary: Admit 11/28 for SOB, cough, fatigue.  Positive for COVID pneumonia.  A&Ox4.  RRT called for inability to sat pt above 85 on 15L 02.  Pt. then placed on high-flow and 02 was 93-94%.  Pt. Was subsequently attempting to get out of bed and Sats dropped to 70s-80's.  Pt. Was subsequently placed on  BiPAP with 02 sat at 94%.  Pt. Is resting comfortably.  When bed available, pt. Will transfer to [facility].  Pt. Is continent but prefers Purewick.  No BM this shift.   Adequate UOP.  SBA.  Regular diet.

## 2021-11-30 NOTE — PROGRESS NOTES
Pt placed on 14/10 80% Bipap after desaturated to low 80's on HFNC. Pt is satting 94% on 80% Bipap. Will continue to monitor.    Pilar Madden, RT

## 2021-12-01 NOTE — PLAN OF CARE
Alert and oriented x4 but seems forgetful. VSS on BIPAP at 14/8 and 80% FiO2. NPO. Purewhick in place. Tele NSR. Blood glucose 103, 122. Did pivot from cart to bed but was very weak. Plan to continue to monitor tonight continue to wean oxygen.

## 2021-12-01 NOTE — PROVIDER NOTIFICATION
"Cayla messaged Dr. Joe:    \"Update: pt proned for about 4 hours, she rolled back over and sats decreased to 81-85%. We proned her again and she slowly went back up to 95%\"  "

## 2021-12-01 NOTE — PROGRESS NOTES
[facility]    Medicine Progress Note - Hospitalist Service       Date of Admission:  11/28/2021    Assessment & Plan       Mavis Whiteside is a 76 year old female admitted on 11/28/2021.  Past history of DM II, hypothyroidism, DEX, moderate aortic stenosis due to rheumatic disease, unvaccinated against COVID who presented with cough and fatigue, found to have COVID-19 pneumonia.     Sepsis and acute hypoxic respiratory failure due to COVID-19 pneumonia  Possible bacterial superinfection  Presents with 2 weeks of multiple COVID symptoms, most notably productive cough with fever (102), tachypnea (22), hypoxia (80's on room air) and CT demonstrating patchy bilateral infiltrates as well as more dense consolidation of right lower lobe.  Procal low at 0.08, no leukocytosis.   Still has fever and overnight was on BiLevel positive airway pressure with 100%FIO2.  We proned her this morning with oxygen saturation rising from 88% to 97%.    Continue BiLevel positive airway pressure and oxygen     Prone positioning     Continue dexamethasone, remdesivir    Continue ceftriaxone and azithromycin      Continue albuterol     Follow Covid labs and blood cultures    Wean oxygen as able    Lovenox DVT prophylaxis     Ideally would transfer her to the intensive care unit but no bed available this morning     Not a tocilizumab candidate per intensivist due to low C-reactive protein      Addendum  Discussed with intensivist- will transfer to intensive care unit later this afternoon when a bed is available.  Orders done.     Acute infectious encephalopathy  Reporting forgetfulness over the past 2 weeks, some trouble tracking conversations (was noted to be easily distractible during interview).  Admission CT head negative for acute pathology, neuro exam non-focal and oriented x4.  Likely related to COVID.     Monitor     Hyponatremia  Admission Na 130, now 134  Resolved      DEX    Now on BiLevel positive airway  pressure      DM II, diet controlled    Continue glucometer's and correction scale      Hypothyroidism    Continue PTA levothyroxine but change to intravenous      Moderate aortic stenosis due to rheumatic fever     Diet: NPO for Medical/Clinical Reasons Except for: Meds    DVT Prophylaxis: Enoxaparin (Lovenox) SQ  Ivey Catheter: Not present  Central Lines: None  Code Status: Full Code      Disposition Plan   Expected discharge: 12/06/2021   recommended to prior living arrangement once SIRS/Sepsis treated.     The patient's care was discussed with the Patient/nurse/intensivist    Vasyl Joe MD  Hospitalist Service  [facility]  Securely message with the Vocera Web Console (learn more here)  Text page via [employer] Paging/Directory        Clinically Significant Risk Factors Present on Admission                ______________________________________________________________________    Interval History   Generally uncomfortable.    Data reviewed today: I reviewed all medications, new labs and imaging results over the last 24 hours. I personally reviewed no images or EKG's today.    Physical Exam   Vital Signs: Temp: 99.4  F (37.4  C) Temp src: Axillary BP: 122/62 Pulse: 77   Resp: 27 SpO2: 96 % O2 Device: BiPAP/CPAP Oxygen Delivery: 60 LPM  Weight: 226 lbs 13.65 oz  Constitutional: awake, alert, cooperative, no apparent distress. BiLevel positive airway pressure mask on.  Respiratory: some increased work of breathing, no wheezing, rales or rhonchi audible  Cardiovascular: regular rate and rhythm, normal S1 and S2, no S3 or S4, and no murmur noted  GI: normal bowel sounds, soft, non-distended, non-tender  Skin: no rashes  Neuropsychiatric: General: normal, calm and normal eye contact    Data   Recent Labs   Lab 12/01/21  1121 12/01/21  0756 12/01/21  0609 11/30/21  1126 11/30/21  0850 11/29/21  0824 11/29/21  0751 11/28/21  2256 11/28/21  1617   WBC  --   --  6.4  --  6.9  --  3.8*  --   5.9   HGB  --   --  14.8  --  14.3  --  16.1*  --  14.9   MCV  --   --  88  --  87  --  93  --  88   PLT  --   --  209  --  191  --  156  --  170   INR  --   --   --   --   --   --   --   --  0.97   NA  --   --  140  --  134  --  135  --  132*   POTASSIUM  --   --  4.1  --  4.3  --  4.3  --  3.9   CHLORIDE  --   --  111*  --  104  --  107  --  100   CO2  --   --  24  --  25  --  21  --  25   BUN  --   --  26  --  27  --  30  --  29   CR  --   --  0.61  --  1.07*  --  0.86  --  1.05*   ANIONGAP  --   --  5  --  5  --  7  --  7   LEENA  --   --  7.7*  --  8.4*  --  8.5  --  8.0*   GLC 90 103* 127*   < > 113*   < > 178*   < > 111*   ALBUMIN  --   --   --   --   --   --   --   --  3.0*   PROTTOTAL  --   --   --   --   --   --   --   --  7.6   BILITOTAL  --   --   --   --   --   --   --   --  0.3   ALKPHOS  --   --   --   --   --   --   --   --  58   ALT  --   --   --   --   --   --   --   --  60*   AST  --   --   --   --   --   --   --   --  104*   TROPONIN  --   --   --   --   --   --   --   --  <0.015    < > = values in this interval not displayed.     No results found for this or any previous visit (from the past 24 hour(s)).  Medications     - MEDICATION INSTRUCTIONS -       - MEDICATION INSTRUCTIONS -       - MEDICATION INSTRUCTIONS -         remdesivir  100 mg Intravenous Q24H    And     sodium chloride 0.9%  50 mL Intravenous Q24H     albuterol  2 puff Inhalation 4x Daily     azithromycin  250 mg Intravenous Q24H     cefTRIAXone  2 g Intravenous Q24H     dexamethasone  6 mg Intravenous Q24H     enoxaparin ANTICOAGULANT  40 mg Subcutaneous Q24H     insulin aspart  1-3 Units Subcutaneous TID AC     insulin aspart  1-3 Units Subcutaneous At Bedtime     [Held by provider] levothyroxine  112 mcg Oral Daily     levothyroxine  84 mcg Intravenous Daily     sodium chloride (PF)  3 mL Intracatheter Q8H

## 2021-12-01 NOTE — PLAN OF CARE
1900h-0700h: Patient AOx4. O2Sat 92% on BiPAP w/ FIO2 of 80%. Blanchable redness on the bridge of the nose; foam dressing on for protection. Clear breath sound in UL, RML & diminished in LL. Dyspnea on exertion. Intermittent dry cough. Currently on NPO; IV NS at 75 ml/hr started. Mouth care done every 2h. Incontinent of bladder, purewick in place w/ adequate output. Normoactive BS x4. Able to shift weight. No edema on BLE, sensation intact & palpable pulses.      0200h: Patient desaturates as low as 85% during oral cares & takes time to go up. Increased FIO2 to 90%. ABG done, PO2 only 53; RT recommends ICU placement. Will inform next shift for this matter. RT will increase EPAP for now.     Dexamethasone completed 11/30.  Remdesivir LD on 12/03  0600h: For CBC, CRP, D-dimer & Fibrinogen.

## 2021-12-02 NOTE — CONSULTS
CLINICAL NUTRITION SERVICES - BRIEF NOTE    Chart reviewed and pt discussed this morning during interdisciplinary rounds. TF assessment completed this morning, but pt was NPO on Bipap without EN access. Noted pt has been intubated this afternoon and OG vs NG placed. Consult received to initiate TF.     12/2: AXR = Enteric tube tip and sideholes project over the stomach (OGT vs NGT)     Labs reviewed. K+ 4.1 (WNL), Mg 2.7 (H), Phos 2.4 (L)  Meds reviwed. Propofol at 29 ml/hr = 696 kcal. MIVF (LR) at 100 ml/hr. Norepi (0.08 mcg/kg/min at 29 ml/hr)    ASSESSED NUTRITION NEEDS PER APPROVED PRACTICE GUIDELINES:  Dosing Weight 96.5 kg for energy (12/2) and 61 kg for protein (IBW)  Estimated Energy Needs: 6051-7527 kcals (14-17 Kcal/Kg)  Justification: obesity guidelines   Estimated Protein Needs:  grams protein (1.5-2 g pro/Kg)  Justification: hypercatabolism with acute illness and obesity guidelines     Interventions:  - Initiate at Vital HP at 15 ml/hr and advance by 10 ml q 8 hours to goal rate 35 ml/hr  - Prosource 2 packets/day = 80 kcal, 22 g protein   - FWF 60 ml q 4 hours     Vital High Protein @ goal of  35ml/hr  (840ml/day)  will provide: 840 kcals, 73 g PRO, 702 ml free H20, 93 g CHO, and 0 g fiber daily.  TOTAL (TF + Prosource + Propofol) = 1616 kcal (17 kcal/kg) and 95 g protein (1.6 g/kg)    RD will continue to follow and monitor per protocol.     Ekta Segovia, SUDHA, LD  ICU RD Pager: [phone]

## 2021-12-02 NOTE — PLAN OF CARE
Neuro: A&Ox4, PERRL, follows commands, opens eyes spontaneously, moves all extremities     CV: tele SR, BP goals met without intervention     Resp: LS diminished, BiPAP settings unchanged overnight 16/10 at 100%     GI: BS hypoactive, constipated - last BM 11/28, NPO     : pure wick in place with adequate urine output      Skin: bridge of nose with blanchable erythema, scattered bruising     Additional: denies pain, max temp 101.2F - resolved with tylenol, no calls from family this shift

## 2021-12-02 NOTE — PROGRESS NOTES
ICU Progress Note    A/P:  76F DEX, DM2, aortic stenosis, hypothyroidism admitted 11/28 for acute hypoxemic respiratory failure 2/2 COVID-19. Transferred to ICU 12/1. Intubated 12/2.     I have personally reviewed the daily labs, imaging studies, cultures and discussed the case with referring physician and consulting physicians.     Neuro  # Sedation for mechanical ventilation  # Paralysis  - fentanyl gtt  - midazolam gtt  - propofol gtt  - BIS 40-60  - RASS -5  - vecuronium gtt    Pulmonary  # Acute hypoxemic respiratory failure  # ARDS  # DEX  - vent settings below  - low tidal volume ventilation  - inhaled epoprostenol   - albuterol     Cardiac  # Aortic stenosis   # Shock - 2/2 sepsis, sedation, obstruction 2/2 positive pressure ventilation  - MAP > 65  - norepi gtt    Renal  # No acute issues  - monitor UOP, Cr, I/O  - electrolyte replacement protocols     GI  # Mildly elevated transaminases  - monitor hepatic panel  - RD c/s for TF     Heme/Onc  # Coagulopathy 2/2 COVID-19  - monitor CBC  - enoxaparin     ID  # COVID-19  # Concern for superimposed bacterial PNA  - dexamethasone 12mg  - remdesivir  - not a candidate for tocilizumab 2/2 low CRP  - CTX, azithro empirically     Endo  # DM2 w/ hyperglycemia  # Hypothyroidism  - monitor BG  - sliding scale insulin  - levothyroxine     PPX  1. DVT: enoxaparin   2. VAP: HOB 30 degrees, chlorhexidine rinse  3. Stress Ulcer: PPI  4. Restraints: Nonviolent soft two point restraints required and necessary for patient safety and continued cares and good effect as patient continues to pull at necessary lines, tubes despite education and distraction. Will readdress daily.   5. Wound care - per unit routine   6. Feeding - TF  7. Family updated via phone.    Interval Hx:  Transferred to ICU on medicine service 12/1 on BiPAP. This AM, worsening hypoxemia requiring intubation.      Unable to obtain ROS 2/2 sedation/intubation.     BP 94/43   Pulse 60   Temp 97.8  F (36.6  C)  (Axillary)   Resp 12   Wt 96.5 kg (212 lb 11.9 oz)   SpO2 (!) 89%   BMI 33.32 kg/m    Gen: lying in bed, supine  Neuro: sedated  HEENT: anicteric  Card: RRR  Pulm: coarse b/l  Abd: soft  MSK: no edema  Skin: no obvious rash     Ventilation Mode: CMV/AC  (Continuous Mandatory Ventilation/ Assist Control)  FiO2 (%): 100 % (18/12)  Rate Set (breaths/minute): 18 breaths/min  Tidal Volume Set (mL): 470 mL  PEEP (cm H2O): 16 cmH2O  Oxygen Concentration (%): 100 %  Resp: 12        Intake/Output Summary (Last 24 hours) at 12/2/2021 1617  Last data filed at 12/2/2021 0600  Gross per 24 hour   Intake 50 ml   Output 825 ml   Net -775 ml       Labs: reviewed    Imaging: reviewed    Billing: Patient is critically ill. Total critical care time today, excluding procedures, was 60 minutes.    Jarred Barillas MD  Pulmonary Disease and Critical Care Medicine   [facility]

## 2021-12-02 NOTE — PROCEDURES
Procedure: R internal jugular TLC placement    Pre-procedure dx: ARDS  Post-procedure dx: ARDS    Indication: frequent lab draws and multiple drips    Description: TLC placed under emergency conditions. The right neck and anterior chest was widely prepped and draped in the usual sterile manner. The right internal jugular was identified using the ultrasound. It was accessed with a needle using a single pass with the needle. The wire was threaded down the syringe/needle. The needle was removed. The skin was nicked with an 11 blade. The track was dilated. The triple lumen catheter was threaded over the wire to 15 cm. The wire was removed. The catheter was sutured in place. All ports julianne and flushed easily. The skin and catheter were cleaned with chloraprep and a tegaderm was applied. Patient tolerated the procedure well.   CXR pending.

## 2021-12-02 NOTE — PROCEDURES
Procedure: right radial arterial line placement    Pre-op dx: ARDS  Post-op dx: ARDS    Indication: frequent ABGs    Description: Under emergent conditions, a right radial arterial line was placed. First, the radial artery was manually occluded with a finger tip at the wrist. The perfusion to the finger tips did not change. Under ultrasound guidance the right arterial line was accessed with a needle. Using a seldinger technique, the catheter was placed into the artery. The pressure tubing was attached and a triphasic waveform was seen. The catheter was sutured in place. The area was cleaned with chloraprep and a tegaderm was applied.

## 2021-12-02 NOTE — PLAN OF CARE
Patient arrived to ICU at 18:15.  Patient on Bipap 16/10 100%.  Patient was desatting to 86-87%, so patient self proned and O2 sat increased to 96%.  Patient tachypneic, but denies SOB or increased WOB.  Other VSS.  NSR. Patient has external catheter.  Patient and daughter (via phone) updated on POC, questions answered.

## 2021-12-02 NOTE — PROCEDURES
"[facility] Procedure Note          Intubation:      Date/Time: 1:15 PM  Performed by: Pb Jose MD    Consent: Verbal consent obtained and the patient states understanding of the procedure being performed.  Risks and benefits: risks, benefits and alternatives were discussed  Patient identity confirmed: verbally with patient and arm band  Time out: Immediately prior to procedure a \"time out\" was called to verify the correct patient, procedure, equipment, support staff and site/side marked as required.    Indication:  Respiratory failure and airway protection.    Description: 20 mg of etomidate followed by 100 mg of succinylcholine were pushed. The patient was laid flat and the BiPap was removed. The glidescope was introduced into the mouth and the cords were visualized. A 7.5 ETT was passed easily through the cords. Correct placement was confirmed using auscultation and colour capnography. Patient tolerated procedure well.     CXR pending    Complications:  None    Patient tolerance: Patient tolerated the procedure well with no immediate complications.      "

## 2021-12-02 NOTE — CONSULTS
"CLINICAL NUTRITION SERVICES  -  ASSESSMENT NOTE      Future/Additional Recommendations:   Pt does not currently have enteral access. RD will follow along daily and place orders once FT placed and appropriate to use.     Once FT placed and verified by AXR:  - Initiate Promote (no fiber) at 15 ml/hr and advance by 10 ml q 8 hours  - FWF 60 ml q 4 hours    Promote @ goal of  65ml/hr  (1560ml/day)  will provide: 1560 kcals, 98 g PRO, 1308 ml free H20, 202 g CHO, and 0 g fiber daily. Meets > 100% DRIs.     If intubated and sedated with propofol, may need to adjust regimen. All enteral products on formulary will be suitable for gluten free and lactose intolerant diet.    Malnutrition:   % Weight Loss:  > 2% in 1 week (severe malnutrition)  % Intake:  </= 50% for >/= 5 days (severe malnutrition)  Subcutaneous Fat Loss:  Deferred with COVID-19 precautions   Muscle Loss:  Deferred with COVID-19 precautions   Fluid Retention: Does not meet criteria - trace generalized     Malnutrition Diagnosis: Severe malnutrition  In Context of:  Acute illness or injury       REASON FOR ASSESSMENT  Mavis Whiteside is a 76 year old female seen by Registered Dietitian for Provider Order - Registered Dietitian to Assess and Order TF per Medical Nutrition Therapy Protocol - \"Patient requests gluten/lactose free if possible\"    Per chart review, PMH significant for DM II, hypothyroidism, DEX and moderate aortic stenosis due to rheumatic disease.  Admitted 2/2 COVID pneumonia.       NUTRITION HISTORY  - Information obtained from chart review. Pt currently with tenuous respiratory status on Bipap.   - Not previously known to clinical nutrition services.   - Per H&P, pt noted she was having diarrhea anytime she ate.   - Food allergies: Gluten and Lactose (\"brain fog\")      CURRENT NUTRITION ORDERS  Diet Order:     NPO     Current Intake/Tolerance:  Pt has been here for 4 days with minimal to no PO intake during this time due to respiratory status. " "      NUTRITION FOCUSED PHYSICAL ASSESSMENT FOR DIAGNOSING MALNUTRITION)  No:  Deferred at this time with COVID-19 precautions           Obtained from Chart/Interdisciplinary Team:  - trace generalized edema   - Last BM on 11/28   - Nutrition Michael: 1 (very poor)    No FT access at this time.     ANTHROPOMETRICS  Height: 5' 7\"  Weight: 212 lbs 11.9 oz  Body mass index is 33.32 kg/m .  Weight Status:  Obesity Grade I BMI 30-34.9  IBW: 61 kg   % IBW: 158%  Weight History: Admission wt on 11/29 was 102.9 kg (226# 13.7 oz). Unclear if this is accurate or not, but pt has potentially lost 14# within the past 4 days likely in part due to dehydration.   Wt Readings from Last 10 Encounters:   12/02/21 96.5 kg (212 lb 11.9 oz)   03/22/21 96.2 kg (212 lb)       LABS  Labs reviewed  - K+ 4.1 (WNL)   - Last Mg and Phos on 11/28 WNL  - CRP 45.7 (H) - trending up   - Hgb A1c 6.3% (11/28)  - -156    MEDICATIONS  Medications reviewed  - IV levothyroxine   - low sliding scale insulin   - MIVF (LR) scheduled to run at 100 ml/hr       ASSESSED NUTRITION NEEDS PER APPROVED PRACTICE GUIDELINES:    Dosing Weight   IF INTUBATED: 96.5 kg for energy (12/2) and 61 kg for protein (IBW)  Estimated Energy Needs: 8385-8325 kcals (14-17 Kcal/Kg)  Justification: obesity guidelines   Estimated Protein Needs:  grams protein (1.5-2 g pro/Kg)  Justification: hypercatabolism with acute illness and obesity guidelines   Estimated Fluid Needs: 1 mL/Kcal or per provider pending fluid status       NUTRITION DIAGNOSIS:  Inadequate protein-energy intake related to NPO status with tenuous respiratory status as evidenced by meeting < 50% energy and protein needs over the past 4 days at least and suspect longer with sx onset PTA       NUTRITION INTERVENTIONS  Recommendations / Nutrition Prescription  Once FT placed and verified by AXR:  - Initiate Promote (no fiber) at 15 ml/hr and advance by 10 ml q 8 hours  - FWF 60 ml q 4 hours    Promote @ goal " of  65ml/hr  (1560ml/day)  will provide: 1560 kcals, 98 g PRO, 1308 ml free H20, 202 g CHO, and 0 g fiber daily. Meets > 100% DRIs.     If intubated and sedated with propofol, may need to adjust regimen. All enteral products on formulary will be suitable for gluten free and lactose intolerant diet.       Implementation  Nutrition education: Not appropriate at this time due to patient condition  Collaboration and Referral of Nutrition care - pt discussed this morning during interdisciplinary rounds       Nutrition Goals  TF to begin in the next 24-48 hours       MONITORING AND EVALUATION:  Progress towards goals will be monitored and evaluated per protocol and Practice Guidelines      Ekta Segovia RD, LD  ICU RD Pager: [phone]

## 2021-12-02 NOTE — PROGRESS NOTES
[facility]    Medicine Progress Note - Hospitalist Service       Date of Admission:  11/28/2021    Assessment & Plan       Mavis Whiteside is a 76 year old female admitted on 11/28/2021.  Past history of DM II, hypothyroidism, DEX, moderate aortic stenosis due to rheumatic disease, unvaccinated against COVID who presented with cough and fatigue, found to have COVID-19 pneumonia.  Her respiratory status worsened and she was requiring BiLevel positive airway pressure, 100% oxygen and proning.  She was transferred to the intensive care unit on 12/1.     Sepsis and acute hypoxic respiratory failure due to COVID-19 pneumonia  Possible bacterial superinfection  Presented with 2 weeks of multiple COVID symptoms, most notably productive cough with fever (102), tachypnea (22), hypoxia (80's on room air) and CT demonstrating patchy bilateral infiltrates as well as more dense consolidation of right lower lobe.  Procal low at 0.08, no leukocytosis. On admission she was started on standard COVID-19 treatment plus antibiotics to cover community acquired pneumonia.  Despite this her oxygen requirement went from 2 lpm by nasal canula to BiLevel positive airway pressure with 100% oxygen.  She was requiring prone positioning to maintain oxygen saturation >90%.  On 12/1 she was transferred to the intensive care unit.  She has been relatively stable overnight.  Currently on BiLevel positive airway pressure 16/10 with 100% oxygen and on her side her oxygen saturation is around 94%.  Supine she dropped to 86%.  She is still having fever.  C-reactive protein and d-dimer are increasing. WBC is stable/normal.  She feels a little short of breath.     Continue BiLevel positive airway pressure and oxygen     Prone positioning as needed     Continue courses of dexamethasone, remdesivir    Continue course of ceftriaxone and azithromycin      Continue albuterol     ABG and chest X-ray today     Follow Covid labs    Wean  oxygen as able    Enoxaparin DVT prophylaxis     Consider tocilizumab    If d-dimer continues to rise consider CT if feasible versus empiric full anticoagulation    Acute septic encephalopathy  Reporting forgetfulness over the past 2 weeks, some trouble tracking conversations (was noted to be easily distractible during interview).  Admission CT head negative for acute pathology, neuro exam non-focal and oriented x4.  Likely related to COVID.   Mental status is good today.    Monitor     Hyponatremia  Admission Na 130, now 134  Resolved     Mild liver transaminase elevation    Repeat panel today     DEX    Now on BiLevel positive airway pressure      DM II, diet controlled    Continue glucometer's and correction scale      Hypothyroidism    Continue PTA levothyroxine but changed to intravenous      Moderate aortic stenosis due to rheumatic fever    FEN    Start maintenance intravenous fluid     Place nasogastric tube for tube feedings     Check electrolytes     Nutrition consultation     Malnutrition:   % Weight Loss:  > 2% in 1 week (severe malnutrition)  % Intake:  </= 50% for >/= 5 days (severe malnutrition)  Subcutaneous Fat Loss:  Deferred with COVID-19 precautions   Muscle Loss:  Deferred with COVID-19 precautions   Fluid Retention: Does not meet criteria - trace generalized   Malnutrition Diagnosis: Severe malnutrition  In Context of:  Acute illness or injury     Diet: NPO for Medical/Clinical Reasons Except for: Meds    DVT Prophylaxis: Enoxaparin (Lovenox) SQ  Ivey Catheter: Not present  Central Lines: None  Code Status: Full Code      Disposition Plan   Expected discharge: 12/08/2021   recommended to prior living arrangement once SIRS/Sepsis treated.     The patient's care was discussed with the Patient/nurse/intensivist    Vasyl Joe MD  Hospitalist Service  [facility]  Securely message with the Vocera Web Console (learn more here)  Text page via [employer]  Paging/Directory        Clinically Significant Risk Factors Present on Admission                ______________________________________________________________________    Interval History   Generally uncomfortable.    Data reviewed today: I reviewed all medications, new labs and imaging results over the last 24 hours. I personally reviewed no images or EKG's today.    Physical Exam   Vital Signs: Temp: 97.4  F (36.3  C) Temp src: Axillary BP: 117/65 Pulse: 54   Resp: 26 SpO2: 91 % O2 Device: BiPAP/CPAP Oxygen Delivery: 60 LPM  Weight: 212 lbs 11.9 oz  Constitutional: awake, alert, cooperative, no apparent distress. BiLevel positive airway pressure mask on.  Respiratory: some increased work of breathing, no wheezing, rales or rhonchi audible  Cardiovascular: regular rate and rhythm, normal S1 and S2, no S3 or S4, and no murmur noted  GI: normal bowel sounds, soft, non-distended, non-tender  Skin: no rashes  Neuropsychiatric: General: normal, calm and normal eye contact    Data   Recent Labs   Lab 12/02/21  0516 12/01/21  2104 12/01/21  1813 12/01/21  0756 12/01/21  0609 11/30/21  1126 11/30/21  0850 11/28/21  2256 11/28/21  1617   WBC 6.6  --   --   --  6.4  --  6.9   < > 5.9   HGB 14.8  --   --   --  14.8  --  14.3   < > 14.9   MCV 89  --   --   --  88  --  87   < > 88     --   --   --  209  --  191   < > 170   INR  --   --   --   --   --   --   --   --  0.97     --   --   --  140  --  134   < > 132*   POTASSIUM 4.1  --   --   --  4.1  --  4.3   < > 3.9   CHLORIDE 112*  --   --   --  111*  --  104   < > 100   CO2 25  --   --   --  24  --  25   < > 25   BUN 22  --   --   --  26  --  27   < > 29   CR 0.63  --   --   --  0.61  --  1.07*   < > 1.05*   ANIONGAP 3  --   --   --  5  --  5   < > 7   LEENA 7.9*  --   --   --  7.7*  --  8.4*   < > 8.0*   * 156* 154*   < > 127*   < > 113*   < > 111*   ALBUMIN  --   --   --   --   --   --   --   --  3.0*   PROTTOTAL  --   --   --   --   --   --   --   --  7.6    BILITOTAL  --   --   --   --   --   --   --   --  0.3   ALKPHOS  --   --   --   --   --   --   --   --  58   ALT  --   --   --   --   --   --   --   --  60*   AST  --   --   --   --   --   --   --   --  104*   TROPONIN  --   --   --   --   --   --   --   --  <0.015    < > = values in this interval not displayed.     No results found for this or any previous visit (from the past 24 hour(s)).  Medications     - MEDICATION INSTRUCTIONS -       - MEDICATION INSTRUCTIONS -       - MEDICATION INSTRUCTIONS -       - MEDICATION INSTRUCTIONS -         remdesivir  100 mg Intravenous Q24H    And     sodium chloride 0.9%  50 mL Intravenous Q24H     albuterol  2 puff Inhalation 4x Daily     azithromycin  250 mg Intravenous Q24H     cefTRIAXone  2 g Intravenous Q24H     dexamethasone  6 mg Intravenous Q24H     enoxaparin ANTICOAGULANT  40 mg Subcutaneous Q24H     insulin aspart  1-3 Units Subcutaneous TID AC     insulin aspart  1-3 Units Subcutaneous At Bedtime     [Held by provider] levothyroxine  112 mcg Oral Daily     levothyroxine  84 mcg Intravenous Daily     sodium chloride (PF)  3 mL Intracatheter Q8H

## 2021-12-02 NOTE — PLAN OF CARE
"A&O x4. Anxious about going to ICU, stating \"I don't want to die\". House NP spoke with pt before transfer.   Tele NSR. Reports no BM for several days. Purewick used for incontinence- adequate output.  Oral cares done as able with tolerating brief removal of bipap. Congested cough. Rectal tylenol given once this morning for temp 100.9    Bipap was at 90% fio2 at 0700, but increased to 100%.   Sats down to 85%, pt proned at 0930, sats increased to 95-97%  Remained proned approx 4hrs, pt rolled over and sats decreased down to 81%  Pt proned again and provider notified.    "

## 2021-12-02 NOTE — PROGRESS NOTES
Intubation Note    Date of intubation: 12/02/21  Time of intubation: 1315  Location of intubation: ICU  Intubated by: MD  Size of ETT: 7.5  Location (cm) at lip: 26    ETT placement confirmed by: bilateral BS and color capnography  Comments: Chest xray confirmed tube need to be taken out by 4cm; now 22 @lips.

## 2021-12-02 NOTE — PROGRESS NOTES
Prone Positioning Note      Date of initial prone positionin2021     Frequency of head turns: Q2      ETT/Skin assessment:    Skin assessment around ETT: Skin intact  Skin barriers used: Cavilon and optifoam gentle above lips    Plan: RT will continue to assess and monitor.    Emilia Martel, RRT  2021

## 2021-12-03 NOTE — PROGRESS NOTES
Pt stayed proned through  The night  With Q2 head turn.  Recent Labs   Lab 12/02/21  1536 12/01/21  0548 11/30/21  0317 11/28/21  1616   PH 7.32* 7.45 7.45 7.33   PCO2 51* 37 34*  --    PO2 78* 53* 70*  --    HCO3 26 25 24  --    O2PER 100 100 95  --    Ventilation Mode: CMV/AC  (Continuous Mandatory Ventilation/ Assist Control)  FiO2 (%): 100 %  Rate Set (breaths/minute): 18 breaths/min  Tidal Volume Set (mL): 470 mL  PEEP (cm H2O): 16 cmH2O  Oxygen Concentration (%): 100 %  Resp: 18    Plan: To keep monitoring pt proned on full vent support.

## 2021-12-03 NOTE — PLAN OF CARE
Pt remains paralyzed/sedate proned.  Fio2 wean to 80% AGN=269 this a.m.    Bradycardic into high 40's.  BIS high 30's, TOF not accurate, Vec titrating down to . 3.  Pt noted to be hypothermic, bear hugger placed and temp now 37.  Peep 16, Full strength Veletri.  Near 600ml Urine via zee overnight.  Tube feeding started at 15/hr now at 25ml/hr.  Right I.J. possibly migrated out but still has a brisk blood return, Dr. Morin aware but should be assessed by teri bella.  Daughter Sandra updated by phone X2.

## 2021-12-03 NOTE — PROGRESS NOTES
ICU Progress Note    A/P:  76F DEX, DM2, aortic stenosis, hypothyroidism admitted 11/28 for acute hypoxemic respiratory failure 2/2 COVID-19. Transferred to ICU 12/1. Intubated 12/2.   Proned and paralyzed.     I have personally reviewed the daily labs, imaging studies, cultures and discussed the case with referring physician and consulting physicians.     Neuro  # Sedation for mechanical ventilation  # Paralysis  - continue fentanyl/versed/propofol for sedation, goal RASS -5  - continue vecuronium gtt, titrate for 1/4 on train of four    Pulmonary  # ARDS secondary to covid 19 pneumonia  # DEX  # PF ratio 112 this am despite maximal interventions, will supinate and recheck a gas this afternoon.   - vent settings:  Ventilation Mode: CMV/AC  (Continuous Mandatory Ventilation/ Assist Control)  FiO2 (%): 80 %  Rate Set (breaths/minute): 18 breaths/min  Tidal Volume Set (mL): 470 mL  PEEP (cm H2O): 16 cmH2O  Oxygen Concentration (%): 80 %  Resp: 17  - low tidal volume, lung protection ventilation  - continued inhaled epoprostenol   - albuterol nebs    Cardiac  # Aortic stenosis   # Shock - 2/2 sepsis, sedation, obstruction 2/2 positive pressure ventilation  - titrate levophed for goal MAP > 64    Renal  # No acute issues  - strict I&O  - electrolyte replacement protocols     GI  # Mildly elevated transaminases  - will add LFTs to am labs  - continue tube feeds    Heme/Onc  # Coagulopathy 2/2 COVID-19  - monitor CBC  - enoxaparin     ID  # COVID-19 pneumonia  # Concern for superimposed bacterial PNA  - dexamethasone 12mg  - remdesivir  - toci  - Rocephin, azithro empirically     Endo  # DM2 w/ hyperglycemia  # Hypothyroidism  - monitor BG  - sliding scale insulin  - levothyroxine     PPX  1. DVT: enoxaparin   2. VAP: HOB 30 degrees, chlorhexidine rinse  3. Stress Ulcer: PPI  4. Restraints: Nonviolent soft two point restraints required and necessary for patient safety and continued cares and good effect as patient  continues to pull at necessary lines, tubes despite education and distraction. Will readdress daily.   5. Wound care - per unit routine   6. Feeding - TF  7. Family: will update via phone after rounds.    Interval Hx:  Intubated yesterday and lines placed. Proned and paralyzed. Full strength veletri    Unable to obtain ROS 2/2 sedation/intubation.     /67 (BP Location: Left arm)   Pulse (!) 47   Temp 98.2  F (36.8  C)   Resp 17   Wt 97.2 kg (214 lb 4.6 oz)   SpO2 97%   BMI 33.56 kg/m    Gen: intubated, sedated, pronated  Neuro: sedated, PERRL 3--> 2mm  HEENT: anicteric, ETT in place, conjunctiva pink  Card: RRR. Visible p waves on monitor  Pulm: coarse, muffled breath sounds on auscultation  Abd: soft, no appreciable masses (limited exam secondary to prone position)  MSK: no edema  Skin: no obvious rash     Ventilation Mode: CMV/AC  (Continuous Mandatory Ventilation/ Assist Control)  FiO2 (%): 80 %  Rate Set (breaths/minute): 18 breaths/min  Tidal Volume Set (mL): 470 mL  PEEP (cm H2O): 16 cmH2O  Oxygen Concentration (%): 80 %  Resp: 17        Intake/Output Summary (Last 24 hours) at 12/2/2021 1617  Last data filed at 12/2/2021 0600  Gross per 24 hour   Intake 50 ml   Output 825 ml   Net -775 ml       Labs: reviewed    Imaging: reviewed    Billing: Patient is critically ill. Total critical care time today, excluding procedures, was 60 minutes.    Jarred Barillas MD  Pulmonary Disease and Critical Care Medicine   [facility]

## 2021-12-03 NOTE — PROVIDER NOTIFICATION
After 0600 head turn, patients Right I.J. dressing noted to have blood under dressing.  Unclear if catheter has migrated out, blood return noted.  Dr. Ribeiro updated with findings along with critical labwork (CRP inf, D dimer, Fib and LD) from this morning.  No new orders at this time.

## 2021-12-03 NOTE — PROGRESS NOTES
CLINICAL NUTRITION SERVICES - BRIEF NOTE    Chart reviewed and pt discussed this morning during interdisciplinary rounds. TF currently running at 25 ml/hr. Goal rate set at 35 ml/hr yesterday with propofol gtt.     Labs reviewed. K+ 4.3 (WNL), Mg 2.5 (H), Phos 2.8 (WNL).   Meds reviewed. Vecuronium, norepi (0.04 mcg/kg/min), fentanyl and versed for sedation --> propofol off yesterday afternoon     ASSESSED NUTRITION NEEDS PER APPROVED PRACTICE GUIDELINES:  Dosing Weight 96.5 kg for energy (12/2) and 61 kg for protein (IBW)  Estimated Energy Needs: 9400-7077 kcals (14-17 Kcal/Kg)  Justification: obesity guidelines   Estimated Protein Needs:  grams protein (1.5-2 g pro/Kg)  Justification: hypercatabolism with acute illness and obesity guidelines     Interventions:  - Increase TF goal rate to 60 ml/hr. Continue increasing TF by 10-15 ml q 8 hours as tolerated to new goal rate.   - Discontinue Prosource as no longer needed.   - Continue Certavite for micronutrient needs.     Vital High Protein @ goal of  60ml/hr  (1440ml/day)  will provide: 1440 kcals (15 kcal/kg), 125 g PRO (2 g/kg), 1203 ml free H20, 159 g CHO, and 0 g fiber daily. Meets < 100% DRIs.     RD will continue to follow and monitor per protocol.     Ekta Segovia RD, LD  ICU RD Pager: [phone]

## 2021-12-03 NOTE — PROVIDER NOTIFICATION
Discussed pt condition and current hemodynamics/medications/drips.  Inquired about an AM ABG, new order for an a.m. ABG received per Dr. Morin.

## 2021-12-03 NOTE — PROGRESS NOTES
Patient now intubated and intensivist managing [facility] service will sign off at this time  discussed with intensivist

## 2021-12-03 NOTE — PROGRESS NOTES
ICU Progress Note    A/P:  76F DEX, DM2, aortic stenosis, hypothyroidism admitted 11/28 for acute hypoxemic respiratory failure 2/2 COVID-19. Transferred to ICU 12/1. Intubated 12/2.     I have personally reviewed the daily labs, imaging studies, cultures and discussed the case with referring physician and consulting physicians.     Neuro  # Sedation for mechanical ventilation  # Paralysis  - fentanyl gtt  - midazolam gtt  - propofol gtt  - BIS 40-60  - RASS -5  - vecuronium gtt    Pulmonary  # Acute hypoxemic respiratory failure  # ARDS  # DEX  - vent settings below  - low tidal volume ventilation  - inhaled epoprostenol   - albuterol  - supine today     Cardiac  # Aortic stenosis   # Shock - 2/2 sepsis, sedation, obstruction 2/2 positive pressure ventilation  - MAP > 65  - norepi gtt    Renal  # No acute issues  - monitor UOP, Cr, I/O  - electrolyte replacement protocols     GI  # Mildly elevated transaminases  - monitor hepatic panel  - RD c/s for TF     Heme/Onc  # Coagulopathy 2/2 COVID-19  - monitor CBC  - enoxaparin     ID  # COVID-19  # Concern for superimposed bacterial PNA  - dexamethasone 12mg  - remdesivir  - CTX, azithro empirically     Endo  # DM2 w/ hyperglycemia  # Hypothyroidism  - monitor BG  - sliding scale insulin  - levothyroxine     PPX  1. DVT: enoxaparin   2. VAP: HOB 30 degrees, chlorhexidine rinse  3. Stress Ulcer: PPI  4. Restraints: Nonviolent soft two point restraints required and necessary for patient safety and continued cares and good effect as patient continues to pull at necessary lines, tubes despite education and distraction. Will readdress daily.   5. Wound care - per unit routine   6. Feeding - TF  7. Family updated via phone.    Interval Hx:  NAEON. P:F 113. Pplat 29.     Unable to obtain ROS 2/2 sedation/intubation.     /67 (BP Location: Left arm)   Pulse (!) 46   Temp 98.2  F (36.8  C)   Resp 17   Wt 97.2 kg (214 lb 4.6 oz)   SpO2 94%   BMI 33.56 kg/m    Gen:  lying in bed, supine  Neuro: sedated  HEENT: anicteric  Card: RRR  Pulm: coarse b/l  Abd: soft  MSK: no edema  Skin: no obvious rash     Ventilation Mode: CMV/AC  (Continuous Mandatory Ventilation/ Assist Control)  FiO2 (%): 100 %  Rate Set (breaths/minute): 18 breaths/min  Tidal Volume Set (mL): 470 mL  PEEP (cm H2O): 16 cmH2O  Oxygen Concentration (%): 80 %  Resp: 17        Intake/Output Summary (Last 24 hours) at 12/2/2021 1617  Last data filed at 12/2/2021 0600  Gross per 24 hour   Intake 50 ml   Output 825 ml   Net -775 ml       Labs: reviewed    Imaging: reviewed    Billing: Patient is critically ill. Total critical care time today, excluding procedures, was 35 minutes.    Jarred Barillas MD  Pulmonary Disease and Critical Care Medicine   [facility]

## 2021-12-03 NOTE — PLAN OF CARE
Patient was unable to maintain optimal SpO2 > 88% despite self semi-proning and max bipap settings.  After speaking with patient, decision was made to intubate, line, paralyze and prone.  Will remain in proned positioning and recheck abg's with am labs.  Currently full vent support, 100 FiO2, Vt 470, R 18, PEEP 16.  Full strength veletri, veceronium, versed, fentanyl,  and Norepi.  Ivey catheter for deep sedation/paralysis with good uop.  OG in stomach.  OK to begin tube feeding tonight.  BGM treated with sliding scale insulin.  Phosphorus replacement TID per OG beginning tonight.  Daughter called and updated with POC.

## 2021-12-04 NOTE — PLAN OF CARE
Neuro: PERRL, brisk. Unresponsive, no response to stimuli in extremities x4. BIS ~25-35.  CV: SB, MAP >65 achieved with pressor support. Moderate edema.  Resp: CMV, LS very diminished. Minimal secretions.  GI: NPO, OGT running at goal. BS normoactive, no BM this shift. Large residuals from OGT.  : zee, borderline UO. BG's in the mid 200s, covered with novolog.  Skin: scab to nose, scattered bruising, blanchable redness to back of neck around ETT tape.  Lines: R radial art line  Access: RIJ, L PIV  Gtts: versed, fentanyl, vec, levophed, veletri, tko  Other: prone since 12/3 2000

## 2021-12-04 NOTE — PROVIDER NOTIFICATION
MD Notification    Notified Person Name: Dr. Gann    Notification Date/Time: 12/3 2140    Purpose of Notification: notified of bradycardia ~40 after proning.    Orders Received: no current plan in place, to discuss further if bradycardia worsens

## 2021-12-04 NOTE — PROGRESS NOTES
[facility] ICU RESPIRATORY NOTE        Date of Admission: 11/28/2021    Date of Intubation (most recent): 12/02/21    Reason for Mechanical Ventilation: Respiratory failure     Number of Days on Mechanical Ventilation: 1    Met Criteria for Spontaneous Breathing Trial: No    Reason for No Spontaneous Breathing Trial: Peep of 16 and full strength veletri     Significant Events Today: Supined today at 11    ABG Results:   Recent Labs   Lab 12/03/21  1319 12/03/21  0436 12/02/21  1536 12/01/21  0548   PH 7.36 7.38 7.32* 7.45   PCO2 49* 47* 51* 37   PO2 91 90 78* 53*   HCO3 28 28 26 25   O2PER 90 80 100 100       Current Vent Settings: Ventilation Mode: CMV/AC  (Continuous Mandatory Ventilation/ Assist Control)  FiO2 (%): 90 %  Rate Set (breaths/minute): 18 breaths/min  Tidal Volume Set (mL): 460 mL  PEEP (cm H2O): 16 cmH2O  Oxygen Concentration (%): 90 %  Peak Inspiratory Pressure (cm H2O) (Drager Tri): 31  Resp: 20      Skin Assessment: intact     Plan: Continue full vent support and wean as tolerated     Flori Coffey, RT

## 2021-12-04 NOTE — PROGRESS NOTES
[facility] ICU RESPIRATORY NOTE           Date of Admission: 11/28/2021     Date of Intubation (most recent): 12/02/21     Reason for Mechanical Ventilation: Respiratory failure      Number of Days on Mechanical Ventilation: 2     Met Criteria for Spontaneous Breathing Trial: No     Reason for No Spontaneous Breathing Trial: Peep of 16 and full strength veletri      Significant Events Today: Prone at 2000 Q2 head turns    /67 (BP Location: Left arm)   Pulse (!) 47   Temp 99  F (37.2  C)   Resp 18   Wt 97.7 kg (215 lb 6.2 oz)   SpO2 96%   BMI 33.73 kg/m      Recent Labs   Lab 12/04/21  0406 12/03/21  1319 12/03/21  0436 12/02/21  1536   PH 7.36 7.36 7.38 7.32*   PCO2 51* 49* 47* 51*   PO2 114* 91 90 78*   HCO3 29* 28 28 26   O2PER 70 90 80 100     Venous Blood Gas  Recent Labs   Lab 12/04/21  0406 12/03/21  1319 12/03/21  0436 12/02/21  1536 11/30/21  0317 11/28/21  1616   HCO3V  --   --   --   --   --  25   O2PER 70 90 80 100   < >  --     < > = values in this interval not displayed.     Ventilation Mode: CMV/AC  (Continuous Mandatory Ventilation/ Assist Control)  FiO2 (%): 70 %  Rate Set (breaths/minute): 18 breaths/min  Tidal Volume Set (mL): 460 mL  PEEP (cm H2O): 16 cmH2O  Oxygen Concentration (%): 70 %  Peak Inspiratory Pressure (cm H2O) (Drager Tri): 31  Resp: 18    PRASHANT KAHN, RT

## 2021-12-04 NOTE — PROGRESS NOTES
[facility] ICU RESPIRATORY NOTE        Date of Admission: 11/28/2021    Date of Intubation (most recent): 12/2/21    Reason for Mechanical Ventilation: Respiratory failure    Number of Days on Mechanical Ventilation: 2    Met Criteria for Spontaneous Breathing Trial: No    Reason for No Spontaneous Breathing Trial: PEEP>8    Significant Events Today: Supined at 12:00 for 4 hours. Proned at 16:00 with Q2H head turns.    ABG Results:   Recent Labs   Lab 12/04/21  1302 12/04/21  0406 12/03/21  1319 12/03/21  0436   PH 7.38 7.36 7.36 7.38   PCO2 51* 51* 49* 47*   PO2 75* 114* 91 90   HCO3 30* 29* 28 28   O2PER 80 70 90 80       Current Vent Settings: Ventilation Mode: CMV/AC  (Continuous Mandatory Ventilation/ Assist Control)  FiO2 (%): 80 %  Rate Set (breaths/minute): 18 breaths/min  Tidal Volume Set (mL): 460 mL  PEEP (cm H2O): 16 cmH2O  Oxygen Concentration (%): 80 %  Peak Inspiratory Pressure (cm H2O) (Drager Tri): 29  Resp: 18      Skin Assessment: Blanchable redness back of neck where skin fold is located. Mepilex applied. All else intact.    Plan: Continue to prone with Q2H head turns and supining. Wean from mechanical ventilation when able.    Philip Young, RT

## 2021-12-04 NOTE — PROGRESS NOTES
ICU Progress Note    ICU Day: 4  Vent Day 4  Hosp Day: 6      HOSPITAL COURSE  77 yo female with PMH of DEX, DM aortic stenosis , hypothyroidism admit 11/28 with fever, dyspnea diagnosed with COVID PNA 11/28 (home test) though 2 week of symptoms prior. Unvaccinated and did not receive monoclonal Ig. In ED sats mid 80 on RA corrected with 3L however escalating and confused.  Initially admitted to floor on BiPAP however failed and intubtated and transferred to ICU 12/1  Requiring paralysis and proning    12/1 - transfer to ICU Bipap 100% 16/10   12/2 - Intubated, lines (CVC, Art line, Ivey, OG), paralyzed and proned   P/F ratio = 78.   12/3 - Sedation and paralytic weaned d/t bradycardia. Supined at 5792-6563; re-proned at 2000.      Interval Hx:  -proned 8pm 12/3   - sugar elevated   - P/F 163 this AM        ASSESSMENT/PLAN:  76F DEX, DM2, aortic stenosis, hypothyroidism admitted 11/28 for acute hypoxemic respiratory failure 2/2 COVID-19. Transferred to ICU 12/1. Intubated 12/2.   Proned and paralyzed.     I have personally reviewed the daily labs, imaging studies, cultures and discussed the case with referring physician and consulting physicians.     Neuro  # Sedation for mechanical ventilation  # Paralysis  # encephalopathy, metabolic  - continue fentanyl/versed/propofol for sedation,  - goal bis ~40  - wean off vecuronium gtt follow synchrony     Pulmonary  # ARDS secondary to covid 19 pneumonia  # DEX  # Acute hypoxemic respiratory failure - oxygenation, mechanics acceptable  Plat 29 and Pdrive 15 this AM  - vent settings:  Ventilation Mode: CMV/AC  (Continuous Mandatory Ventilation/ Assist Control)  FiO2 (%): 60 %  Rate Set (breaths/minute): 18 breaths/min  Tidal Volume Set (mL): 460 mL  PEEP (cm H2O): 16 cmH2O  Oxygen Concentration (%): 60 %  Peak Inspiratory Pressure (cm H2O) (Drager Tri): 31  Resp: 18  - low tidal volume, lung protection ventilation  - continued inhaled epoprostenol   - albuterol nebs  -  supine/prone protocol 8:16, for P/F <150 ; - supine this AM serial ABG and CXR as indicated     Cardiac  # Shock - 2/2 sepsis, sedation, obstruction 2/2 positive pressure ventilation, good markers of perfusion  # bradycardia - related to sedation? Vagal tone, sinus - hemodynamically appropriate  # Aortic stenosis   - wean/titrate levophed for goal MAP > 64, follow markers of perfusion, if jeannine persists consider  - tele  - aim even fluid status to net negative    Renal  # Hypernatremia - insensible losses  # cr, UOP appropriate   # resp acidosis, mild   - maintain hemodynamic  - strict I&O, prn lasix avoid volume overload  - electrolyte replacement protocols     GI  # Mildly elevated transaminases 2/2 COVID vs med  # at risk for malnutrition  - continue tube feeds  - bowel regimen, PPI  - serial LFTS, exam     Heme/Onc  # Coagulopathy 2/2 COVID-19  - monitor CBC  - enoxaparin     ID  # COVID-19 pneumonia, completed remdesivir , 1 dose toci  # Concern for superimposed bacterial PNA  - dexamethasone 12mg  - Rocephin, azithro empirically for 7 day course    Endo  # DM2 w/ hyperglycemia  # Hypothyroidism  - monitor BG  - sliding scale insulin  - add lantus 15  - levothyroxine     PPX  1. DVT: enoxaparin   2. VAP: HOB 30 degrees, chlorhexidine rinse  3. Stress Ulcer: PPI  4. Restraints: Nonviolent soft two point restraints required and necessary for patient safety and continued cares and good effect as patient continues to pull at necessary lines, tubes despite education and distraction. Will readdress daily.   5. Wound care - per unit routine   6. Feeding - TF  7. Family: will update via phone after rounds.    Unable to obtain ROS 2/2 sedation/intubation.     /67 (BP Location: Left arm)   Pulse (!) 47   Temp 98.8  F (37.1  C)   Resp 18   Wt 97.7 kg (215 lb 6.2 oz)   SpO2 94%   BMI 33.73 kg/m    Gen: intubated, sedated, proned  Neuro: sedated, PERRL 3--> 2mm  HEENT: anicteric, ETT in place, conjunctiva  pink  Card: Unable to examine as prone  Pulm: posteriorly coarse, muffled breath sounds on auscultation synchronous with vent   Abd: unable to exam  MSK: no edema  Skin: no obvious rash     Ventilation Mode: CMV/AC  (Continuous Mandatory Ventilation/ Assist Control)  FiO2 (%): 60 %  Rate Set (breaths/minute): 18 breaths/min  Tidal Volume Set (mL): 460 mL  PEEP (cm H2O): 16 cmH2O  Oxygen Concentration (%): 60 %  Peak Inspiratory Pressure (cm H2O) (Drager Tri): 31  Resp: 18      Intake/Output Summary (Last 24 hours) at 12/4/2021 1019  Last data filed at 12/4/2021 0800  Gross per 24 hour   Intake 2423.39 ml   Output 810 ml   Net 1613.39 ml     ROUTINE ICU LABS (Last four results)  CMPRecent Labs   Lab 12/04/21  0807 12/04/21  0409 12/04/21  0406 12/03/21  2355 12/03/21  0835 12/03/21  0435 12/02/21  1629 12/02/21  1048 12/02/21  0920 12/02/21  0516 12/01/21  0756 12/01/21  0609 11/28/21  2256 11/28/21  1617   NA  --   --  145*  --   --  144  --   --   --  140  --  140   < > 132*   POTASSIUM  --   --  4.3  --   --  4.3  --   --   --  4.1  --  4.1   < > 3.9   CHLORIDE  --   --  113*  --   --  113*  --   --   --  112*  --  111*   < > 100   CO2  --   --  29  --   --  28  --   --   --  25  --  24   < > 25   ANIONGAP  --   --  3  --   --  3  --   --   --  3  --  5   < > 7   * 242* 279* 238*   < > 204*   < >  --    < > 152*   < > 127*   < > 111*   BUN  --   --  29  --   --  19  --   --   --  22  --  26   < > 29   CR  --   --  0.68  --   --  0.64  --   --   --  0.63  --  0.61   < > 1.05*   GFRESTIMATED  --   --  86  --   --  88  --   --   --  88  --  89   < > 52*   LEENA  --   --  8.2*  --   --  8.1*  --   --   --  7.9*  --  7.7*   < > 8.0*   MAG  --   --  2.8*  --   --  2.5*  --  2.7*  --   --   --   --   --  2.2   PHOS  --   --  3.0  --   --  2.8  --  2.4*  --   --   --   --   --  3.1   PROTTOTAL  --   --  6.1*  --   --  6.2*  --  7.1  --   --   --   --   --  7.6   ALBUMIN  --   --  1.8*  --   --  2.0*  --  2.3*  --    --   --   --   --  3.0*   BILITOTAL  --   --  0.1*  --   --  0.3  --  0.4  --   --   --   --   --  0.3   ALKPHOS  --   --  49  --   --  50  --  55  --   --   --   --   --  58   AST  --   --  23  --   --  39  --  58*  --   --   --   --   --  104*   ALT  --   --  25  --   --  29  --  37  --   --   --   --   --  60*    < > = values in this interval not displayed.     CBC  Recent Labs   Lab 12/04/21  0406 12/03/21  0435 12/02/21  0516 12/01/21  0609   WBC 7.2 11.1* 6.6 6.4   RBC 4.48 4.80 5.17 5.12   HGB 12.9 13.5 14.8 14.8   HCT 40.6 42.8 45.8 44.9   MCV 91 89 89 88   MCH 28.8 28.1 28.6 28.9   MCHC 31.8 31.5 32.3 33.0   RDW 14.1 13.9 13.9 13.9    275 216 209     INR  Recent Labs   Lab 11/28/21  1617   INR 0.97     Arterial Blood Gas  Recent Labs   Lab 12/04/21  0406 12/03/21  1319 12/03/21  0436 12/02/21  1536   PH 7.36 7.36 7.38 7.32*   PCO2 51* 49* 47* 51*   PO2 114* 91 90 78*   HCO3 29* 28 28 26   O2PER 70 90 80 100     Recent Results (from the past 24 hour(s))   XR Chest Port 1 View    Narrative    XR CHEST PORT 1 VIEW 12/3/2021 12:18 PM    HISTORY: CVC position / ETT position after supining    COMPARISON: 12/2/21        Impression    IMPRESSION: Endotracheal tube tip is about 6.4 cm above the isela,  previously about 5.5 cm above the isela. The right IJ central venous  catheter tip is now at the confluence of innominate veins, previously  in the mid SVC. Stable patchy bilateral infiltrates. Normal heart  size. No pleural effusion or pneumothorax. Right reverse total  shoulder arthroplasty.    NASIR MAI MD         SYSTEM ID:  [id number]       Labs: reviewed  Imaging: reviewed    Billing: Patient is critically ill. Total critical care time today, excluding procedures, was 40 minutes.    Janusz Harley MD  40 min CCT

## 2021-12-04 NOTE — PLAN OF CARE
Patient prone in the first part of day; Supine around noon; To be proned again at 2000, as per ICU MD Guzman. ETT advanced by 2 cm by RT as per MD order; CVC still okay to use per Dr Barillas, despite being somewhat pulled out. Remains well sedated / paralyzed, unresponsive to stimuli; Pupils PERRL. Paralytic, sedation and pain med infusion weaned as much as possible (as patient's condition allows), due to patient's pronounced bradycardia (HR down to 40). TOF 4/4, however MD agrees not to further increase paralytic due to patient's bradycardia, unless patient dissynchronous with the vent / unstable. Continues to require low dose Levophed for BP support, weaned down as able; In Sinus Bradycardia with rare ectopy; Tube feeds to be increased to goal at 2100; Residuals somewhat high (300-400 mL), however patient tolerates without complication, MD notified; One soft BM this morning, bowel sounds hypoactive and GI protocol initiated; Urine output adequate; Skin red but blanchable perianally; Scab on bridge of nose, otherwise intact. Family updated by MD, all questions answered and concerns addressed.

## 2021-12-05 NOTE — PROGRESS NOTES
[facility] ICU RESPIRATORY NOTE        Date of Admission: 11/28/2021    Date of Intubation (most recent): 12/2/2021.    Reason for Mechanical Ventilation: Respiratory Failure.    Number of Days on Mechanical Ventilation: 4 days.    Met Criteria for Spontaneous Breathing Trial: No.    Reason for No Spontaneous Breathing Trial: PEEP >8 cm H2O, FiO2 >50%.    Significant Events Today: Patient proned today at 0800. PEEP decreased from 16 cm H2O to 14 cm H2O. P/F ratio decreased from 185 to 83 so patient was reproned at 1600.     ABG Results:   Recent Labs   Lab 12/05/21  1049 12/05/21  0418 12/04/21  1302 12/04/21  0406   PH 7.39 7.39 7.38 7.36   PCO2 55* 52* 51* 51*   PO2 83 111* 75* 114*   HCO3 33* 31* 30* 29*   O2PER 100 60 80 70       Current Vent Settings: Ventilation Mode: CMV/AC  (Continuous Mandatory Ventilation/ Assist Control)  FiO2 (%): 100 %  Rate Set (breaths/minute): 18 breaths/min  Tidal Volume Set (mL): 460 mL  PEEP (cm H2O): 14 cmH2O  Oxygen Concentration (%): (S) 100 % (with proning)  Peak Inspiratory Pressure (cm H2O) (Drager Tri): 26  Resp: 17      Skin Assessment: Clean/Dry/Intact.    Plan: Plan: Continue to monitor patient on full ventilatory support. Wean patient as tolerated. Assess for skin assessment Q4. RT to continue to monitor. Continue Q2 head turns while patient is proned.    Alexei Lucas, RT

## 2021-12-05 NOTE — PROGRESS NOTES
ICU Progress Note    ICU Day: 5  Vent Day 5  Hosp Day: 7      HOSPITAL COURSE  75 yo female with PMH of DEX, DM aortic stenosis , hypothyroidism admit 11/28 with fever, dyspnea diagnosed with COVID PNA 11/28 (home test) though 2 week of symptoms prior. Unvaccinated and did not receive monoclonal Ig. In ED sats mid 80 on RA corrected with 3L however escalating and confused.  Initially admitted to floor on BiPAP however failed and intubtated and transferred to ICU 12/1  Requiring paralysis and proning    12/1 - transfer to ICU Bipap 100% 16/10   12/2 - Intubated, lines (CVC, Art line, Ivey, OG), paralyzed and proned   P/F ratio = 78.   12/3 - Sedation and paralytic weaned d/t bradycardia. Supined at 2416-3557; re-proned at 2000.  12/4 - prone, supine, sugars elevated, on/off low dose levophed     Interval Hx:  - improved P/F ~ 190 this AM  - supine this AM, sugars still high  - TOF 4/4 and synchronous  BIS 30 on versed 2 and fentanyl 75       ASSESSMENT/PLAN:  76F DEX, DM2, aortic stenosis, hypothyroidism admitted 11/28 for acute hypoxemic respiratory failure 2/2 COVID-19. Transferred to ICU 12/1. Intubated 12/2.   Proned and paralyzed.     I have personally reviewed the daily labs, imaging studies, cultures and discussed the case with referring physician and consulting physicians.     Neuro  # Sedation for mechanical ventilation  # Paralysis  # encephalopathy, metabolic  - continue fentanyl/versed for sedation,  - goal bis ~40, RASS -4 otherwise off bis  - discontinue vecuronium gtt follow synchrony     Pulmonary  # ARDS secondary to covid 19 pneumonia  # DEX  # Acute hypoxemic respiratory failure - oxygenation, mechanics acceptable  Plat 26 and Pdrive 10 this AM on my eval  - low tidal volume, lung protection ventilation, wean PEEP 14  - continued inhaled epoprostenol   - albuterol nebs  - supine/prone protocol 8:16, for P/F <150 ; - supine this AM serial ABG and CXR as indicated   - follow synchrony off paralytic,  if stable , wean veletri next    Cardiac  # Shock - 2/2 sepsis, sedation, obstruction 2/2 positive pressure ventilation, good markers of perfusion, CXR enlarged silhouette -   # bradycardia - related to sedation? Vagal tone, sinus - hemodynamically appropriate  # Aortic stenosis   - wean/titrate levophed for goal MAP > 64, follow markers of perfusion, if jeannine persists consider  - tele  - aim even fluid status to net negative, will diurese this AM  - ECHO     Renal  # Hypernatremia - insensible losses  # cr, UOP appropriate   # resp acidosis, mild   - maintain hemodynamic  - strict I&O, prn lasix avoid volume overload, 40 today  - electrolyte replacement protocols   - increased free water 150 ml q4     GI  # Mildly elevated transaminases 2/2 COVID vs med, resolved  # at risk for malnutrition  - continue tube feeds  - bowel regimen, PPI  - serial LFTS, exam     Heme/Onc  # Coagulopathy 2/2 COVID-19  - monitor CBC  - enoxaparin     ID  # COVID-19 pneumonia, completed remdesivir , 1 dose toci  # Concern for superimposed bacterial PNA ; - Rocephin, azithro empirically for 5 day course  12/3  - dexamethasone 12mg  - follow clinically     Endo  # DM2 w/ hyperglycemia  # Hypothyroidism  - monitor BG  - sliding scale insulin  - increase lantus 22  - levothyroxine supplementation    PPX  1. DVT: enoxaparin   2. VAP: HOB 30 degrees, chlorhexidine rinse  3. Stress Ulcer: PPI  4. Restraints: Nonviolent soft two point restraints required and necessary for patient safety and continued cares and good effect as patient continues to pull at necessary lines, tubes despite education and distraction. Will readdress daily.   5. Wound care - per unit routine   6. Feeding - TF  7. Family: pending       /66 (BP Location: Left arm)   Pulse (!) 49   Temp 99  F (37.2  C)   Resp 18   Wt 98.8 kg (217 lb 13 oz)   SpO2 93%   BMI 34.11 kg/m    Gen: intubated, sedated, supine  Neuro: sedated, PERRL 3--> 2mm  HEENT: anicteric, ETT in  place, conjunctiva pink  Card: jeannine regular, S1S2 muffled heart sounds  Pulm: anteriorly clear , muffled breath sounds on auscultation synchronous with vent   Abd: soft nontender  No mass  MSK: trace edema, warm   Skin: no obvious rash     Ventilation Mode: CMV/AC  (Continuous Mandatory Ventilation/ Assist Control)  FiO2 (%): 100 % (Patient desaturated to mid 80s with head turn. )  Rate Set (breaths/minute): 18 breaths/min  Tidal Volume Set (mL): 460 mL  PEEP (cm H2O): 16 cmH2O  Oxygen Concentration (%): 60 %  Peak Inspiratory Pressure (cm H2O) (DragStarbak Tri): 29  Resp: 18        Intake/Output Summary (Last 24 hours) at 12/5/2021 0913  Last data filed at 12/5/2021 0600  Gross per 24 hour   Intake 2353.95 ml   Output 1165 ml   Net 1188.95 ml     ROUTINE ICU LABS (Last four results)  CMP  Recent Labs   Lab 12/05/21  0836 12/05/21  0419 12/05/21  0417 12/05/21  0003 12/04/21  0409 12/04/21  0406 12/03/21  0835 12/03/21  0435 12/02/21  1629 12/02/21  1048 12/02/21  0920 12/02/21  0516 11/28/21  2256 11/28/21  1617   NA  --   --  148*  --   --  145*  --  144  --   --   --  140   < > 132*   POTASSIUM  --   --  4.9  --   --  4.3  --  4.3  --   --   --  4.1   < > 3.9   CHLORIDE  --   --  116*  --   --  113*  --  113*  --   --   --  112*   < > 100   CO2  --   --  32  --   --  29  --  28  --   --   --  25   < > 25   ANIONGAP  --   --  <1*  --   --  3  --  3  --   --   --  3   < > 7   * 288* 297* 282*   < > 279*   < > 204*   < >  --    < > 152*   < > 111*   BUN  --   --  36*  --   --  29  --  19  --   --   --  22   < > 29   CR  --   --  0.57  --   --  0.68  --  0.64  --   --   --  0.63   < > 1.05*   GFRESTIMATED  --   --  >90  --   --  86  --  88  --   --   --  88   < > 52*   LEENA  --   --  8.4*  --   --  8.2*  --  8.1*  --   --   --  7.9*   < > 8.0*   MAG  --   --   --   --   --  2.8*  --  2.5*  --  2.7*  --   --   --  2.2   PHOS  --   --  2.6  --   --  3.0  --  2.8  --  2.4*  --   --   --  3.1   PROTTOTAL  --   --   5.9*  --   --  6.1*  --  6.2*  --  7.1  --   --   --  7.6   ALBUMIN  --   --  1.8*  --   --  1.8*  --  2.0*  --  2.3*  --   --   --  3.0*   BILITOTAL  --   --  0.2  --   --  0.1*  --  0.3  --  0.4  --   --   --  0.3   ALKPHOS  --   --  45  --   --  49  --  50  --  55  --   --   --  58   AST  --   --  21  --   --  23  --  39  --  58*  --   --   --  104*   ALT  --   --  25  --   --  25  --  29  --  37  --   --   --  60*    < > = values in this interval not displayed.     CBC  Recent Labs   Lab 12/05/21 0417 12/04/21  0406 12/03/21  0435 12/02/21  0516   WBC 7.5 7.2 11.1* 6.6   RBC 4.27 4.48 4.80 5.17   HGB 12.2 12.9 13.5 14.8   HCT 39.3 40.6 42.8 45.8   MCV 92 91 89 89   MCH 28.6 28.8 28.1 28.6   MCHC 31.0* 31.8 31.5 32.3   RDW 14.4 14.1 13.9 13.9    306 275 216     INR  Recent Labs   Lab 11/28/21  1617   INR 0.97     Arterial Blood Gas  Recent Labs   Lab 12/05/21  0418 12/04/21  1302 12/04/21  0406 12/03/21  1319   PH 7.39 7.38 7.36 7.36   PCO2 52* 51* 51* 49*   PO2 111* 75* 114* 91   HCO3 31* 30* 29* 28   O2PER 60 80 70 90     Recent Results (from the past 24 hour(s))   XR Chest Port 1 View    Narrative    EXAM: XR CHEST PORT 1 VIEW  LOCATION: [facility]  DATE/TIME: 12/4/2021 2:32 PM    INDICATION: Endotracheal tube positioning.  COMPARISON: 12/03/2021.      Impression    IMPRESSION: Endotracheal tube tip is approximately 5 cm above the isela. Enteric tube, tip not seen. A right IJ central venous catheter is unchanged with tip likely in the right brachiocephalic vein. Hazy opacity throughout both lungs have overall   improved slightly compared to 12/03/2021. No pneumothorax. Right shoulder arthroplasty.    XR Abdomen Port 1 View    Narrative    EXAM: XR ABDOMEN PORTABLE 1 VIEWS  LOCATION: [facility]  DATE/TIME: 12/04/2021, 2:45 PM    INDICATION: Feeding tube position.  COMPARISON: None.      Impression    IMPRESSION: An enteric tube is noted within the  stomach, tip above the superior extent of this image. The visualized bowel gas pattern is within normal limits.      XR Abdomen Port 1 View    Narrative    EXAM: XR ABDOMEN PORTABLE 1 VIEWS  LOCATION: [facility]  DATE/TIME: 12/04/2021, 2:46 PM    INDICATION: OG tube position.  COMPARISON: Abdominal radiograph performed earlier today.      Impression    IMPRESSION: An enteric tube is in place, with tip near the gastric fundus. Visualized bowel gas pattern is within normal limits. Hazy opacities at both lung bases are suspicious for pneumonia.      XR Chest Port 1 View    Narrative    EXAM: XR CHEST PORTABLE 1 VIEW  LOCATION: [facility]  DATE/TIME: 12/05/2021, 5:55 AM    INDICATION: COVID. Respiratory failure. ETT placement.  COMPARISON: 12/04/2021.      Impression    IMPRESSION: Endotracheal tube tip approximately 4.5 cm above the isela. Enteric tube tip below the diaphragm. Right IJ catheter tip near the junction of the right internal jugular and right subclavian veins. Cardiac enlargement. Pulmonary vascularity   within normal limits. Hazy diffuse bilateral mid and lower lung infiltrates are stable to slightly improved from the prior study. Aortic calcification. Right shoulder arthroplasty. Remainder unremarkable.         Labs: reviewed  Imaging: reviewed    Billing: Patient is critically ill. Total critical care time today, excluding procedures, was 35 minutes.    Janusz Harley MD

## 2021-12-05 NOTE — PLAN OF CARE
Neuro: PERRL, brisk. Unresponsive, no response to stimuli in extremities x4. BIS ~25-40.  CV: SB, MAP >65 achieved with pressor support. Moderate edema.  Resp: CMV, LS very diminished. Minimal secretions.  GI: NPO, OGT running at goal. No BM this shift. Large residuals from OGT.  : zee, moderate UO. BG's in the high 200s, covered with novolog.  Skin: scab to nose, scattered bruising, blanchable redness to back of neck around ETT tape.  Lines: R radial art line  Access: RIJ, L PIV  Gtts: versed, fentanyl, vec, levophed, veletri, tko  Other: prone since 12/4 1600

## 2021-12-05 NOTE — PLAN OF CARE
0700 - 1900 RN Shift Summary    - Patient prone [id number]; Supine 9087-7693; Prone from 1600.  - P/F ratio significantly worse after lying supine, reproned per MD order.  - Lung sounds clear and diminished, minimal secretions out of ETT.  - Sedated / paralyzed; Tolerates cares & mechanical ventilation well. Pupils PERRL.  - Pronounced bradycardia, down to 38 in the PM, BP not affected; MD notified.  - Off of Levophed at 1820. - Generalized edema somewhat increased.  - No BM, stool softeners given; bowel sounds remain hypoactive. Blood glucose high, in the 200s; Lantus started per MD. Feeding tube repositioned and confirmed on X-Ray; approved for use by ICU MD Barillas.  - Adequate urine output 50 - 75 mL per hour.   - No changes in patient's skin.  - Daughter updated over the phone; Family did video chat with patient for one hour in the afternoon; All questions answered and emotional support provided.

## 2021-12-06 NOTE — PROGRESS NOTES
ICU Progress Note    ICU Day: 5  Vent Day 5  Hosp Day: 8      HOSPITAL COURSE  75 yo female with PMH of DEX, DM aortic stenosis , hypothyroidism admit 11/28 with fever, dyspnea diagnosed with COVID PNA 11/28 (home test) though 2 week of symptoms prior. Unvaccinated and did not receive monoclonal Ig. In ED sats mid 80 on RA corrected with 3L however escalating and confused.  Initially admitted to floor on BiPAP however failed and intubtated and transferred to ICU 12/1  Requiring paralysis and proning    12/1 - transfer to ICU Bipap 100% 16/10   12/2 - Intubated, lines (CVC, Art line, Ivey, OG), paralyzed and proned   P/F ratio = 78.   12/3 - Sedation and paralytic weaned d/t bradycardia. Supined at [id number]; re-proned at 2000.  12/4 - prone, supine, sugars elevated, on/off low dose levophed   12/6- pronated for ~18 hr/day all weekend, on low dose levophed. Paralytic weaned off yesterday am      ASSESSMENT/PLAN:  76F DEX, DM2, aortic stenosis, hypothyroidism admitted 11/28 for acute hypoxemic respiratory failure 2/2 COVID-19. Transferred to ICU 12/1. Intubated 12/2.   Proned and paralyzed.     I have personally reviewed the daily labs, imaging studies, cultures and discussed the case with referring physician and consulting physicians.     Neuro  # Sedation for mechanical ventilation  # encephalopathy, metabolic  - continue fentanyl/versed for sedation,  - titrate sedation for a RASS of -4    Pulmonary  # ARDS secondary to covid 19 pneumonia  # DEX  # Acute hypoxemic respiratory failure - oxygenation, mechanics acceptable    - low tidal volume, lung protection ventilation, wean PEEP 14  - continued inhaled epoprostenol   - albuterol nebs  - supinated this am, will check ABG around noon   - wean veletri once able to tolerate supination for 24 hours    Cardiac  # Shock - 2/2 sepsis, sedation, obstruction 2/2 positive pressure ventilation, good markers of perfusion, CXR enlarged silhouette -   # bradycardia  # Aortic  stenosis   - wean/titrate levophed for goal MAP > 64, follow markers of perfusion, if jeannine persists consider changing to epinephrine  - aim even fluid status to net negative, will diurese this AM    Renal  # Hypernatremia - mild, stable  # cr, UOP appropriate   - strict I&O,   - lasix 40 mg IV x1  - electrolyte replacement protocols   - increased free water 150 ml q4     GI  # Mildly elevated transaminases 2/2 COVID vs med, resolved  # at risk for malnutrition  - continue tube feeds  - bowel regimen, PPI     Heme/Onc  # Coagulopathy 2/2 COVID-19  - monitor CBC  - enoxaparin     ID  # COVID-19 pneumonia, completed remdesivir , 1 dose toci  - dexamethasone 12mg  - follow clinically     Endo  # DM2 w/ hyperglycemia secondary to steroids  # Hypothyroidism  - monitor BG  - sliding scale insulin  - increase lantus to 32  - levothyroxine supplementation    PPX  1. DVT: enoxaparin   2. VAP: HOB 30 degrees, chlorhexidine rinse  3. Stress Ulcer: PPI  4. Restraints: Nonviolent soft two point restraints required and necessary for patient safety and continued cares and good effect as patient continues to pull at necessary lines, tubes despite education and distraction. Will readdress daily.   5. Wound care - per unit routine   6. Feeding - TF  7. Family: pending     Pb Jose MD   SICU Fellow      Interval Hx:  Patient supinated this am. Pronation improved PF ratio by ~100 yesterday.     /61 (BP Location: Left arm)   Pulse (!) 47   Temp 98.4  F (36.9  C)   Resp 18   Wt 98.8 kg (217 lb 13 oz)   SpO2 91%   BMI 34.11 kg/m       Gen: intubated, sedated, supine, appears older than stated age  Neuro: sedated, PERRL 3--> 2mm  HEENT: anicteric, ETT in place, conjunctiva pink  Card: jeannine regular, visible p waves on the monitor  Pulm: anteriorly clear , muffled breath sounds on auscultation synchronous with vent   Abd: soft nontender  No mass  MSK: trace edema, warm   Skin: no obvious rash     Ventilation Mode: CMV/AC   (Continuous Mandatory Ventilation/ Assist Control)  FiO2 (%): 80 %  Rate Set (breaths/minute): 18 breaths/min  Tidal Volume Set (mL): 460 mL  PEEP (cm H2O): 14 cmH2O  Oxygen Concentration (%): 50 %  Peak Inspiratory Pressure (cm H2O) (Drager Tri): 26  Resp: 18        Intake/Output Summary (Last 24 hours) at 12/5/2021 0913  Last data filed at 12/5/2021 0600  Gross per 24 hour   Intake 2353.95 ml   Output 1165 ml   Net 1188.95 ml     ROUTINE ICU LABS (Last four results)  CMP  Recent Labs   Lab 12/06/21  0904 12/06/21  0419 12/06/21  0417 12/06/21  0016 12/05/21  0419 12/05/21  0417 12/04/21  0409 12/04/21  0406 12/03/21  0835 12/03/21  0435 12/02/21  1629 12/02/21  1048   NA  --  147*  --   --   --  148*  --  145*  --  144  --   --    POTASSIUM  --  4.8  --   --   --  4.9  --  4.3  --  4.3  --   --    CHLORIDE  --  112*  --   --   --  116*  --  113*  --  113*  --   --    CO2  --  33*  --   --   --  32  --  29  --  28  --   --    ANIONGAP  --  2*  --   --   --  <1*  --  3  --  3  --   --    * 289* 266* 272*   < > 297*   < > 279*   < > 204*   < >  --    BUN  --  39*  --   --   --  36*  --  29  --  19  --   --    CR  --  0.62  --   --   --  0.57  --  0.68  --  0.64  --   --    GFRESTIMATED  --  88  --   --   --  >90  --  86  --  88  --   --    LEENA  --  8.3*  --   --   --  8.4*  --  8.2*  --  8.1*  --   --    MAG  --  2.5*  --   --   --   --   --  2.8*  --  2.5*  --  2.7*   PHOS  --  2.9  --   --   --  2.6  --  3.0  --  2.8  --  2.4*   PROTTOTAL  --  5.7*  --   --   --  5.9*  --  6.1*  --  6.2*  --  7.1   ALBUMIN  --  1.9*  --   --   --  1.8*  --  1.8*  --  2.0*  --  2.3*   BILITOTAL  --  0.4  --   --   --  0.2  --  0.1*  --  0.3  --  0.4   ALKPHOS  --  40  --   --   --  45  --  49  --  50  --  55   AST  --  30  --   --   --  21  --  23  --  39  --  58*   ALT  --  38  --   --   --  25  --  25  --  29  --  37    < > = values in this interval not displayed.     CBC  Recent Labs   Lab 12/06/21  0419 12/05/21  0417  21  0406 21  0435   WBC 8.0 7.5 7.2 11.1*   RBC 4.19 4.27 4.48 4.80   HGB 12.0 12.2 12.9 13.5   HCT 38.9 39.3 40.6 42.8   MCV 93 92 91 89   MCH 28.6 28.6 28.8 28.1   MCHC 30.8* 31.0* 31.8 31.5   RDW 14.2 14.4 14.1 13.9    324 306 275     INR  No lab results found in last 7 days.  Arterial Blood Gas  Recent Labs   Lab 21  0419 21  1049 21  0418 21  1302   PH 7.42 7.39 7.39 7.38   PCO2 55* 55* 52* 51*   PO2 99 83 111* 75*   HCO3 35* 33* 31* 30*   O2PER 60 100 60 80     Recent Results (from the past 24 hour(s))   Echocardiogram Complete   Result Value    LVEF  60-65%    [facility]    [id number]  [id number]  [id number]  [id number]^ELIZABETH^[id number]     [facility]  Echocardiography Laboratory  [address] [place], [place] [address]     Name: EUGENIE BYERS  MRN: [id number]  : 1945  Study Date: 2021 12:47 PM  Age: 76 yrs  Gender: Female  Patient Location: [facility]  Reason For Study: Shock  Ordering Physician: ELIZABETH SOLANO  Referring Physician: ELIZABETH SOLANO  Performed By: Oh Escalante     BSA: 2.1 m2  Height: 67 in  Weight: 217 lb  HR: 45  BP: 126/51 mmHg  ______________________________________________________________________________  Procedure  Complete Echo Adult.  ______________________________________________________________________________  Interpretation Summary     The left ventricle is normal in size.  The visual ejection fraction is 60-65%.  Grade I or early diastolic dysfunction.  No regional wall motion abnormalities noted.  The right ventricle is normal in size and function.  There is sclerosis, calcification, and restriction of the aortic valve opening  compatible with moderate aortic stenosis.  ______________________________________________________________________________  Left Ventricle  The left ventricle is normal in size. There is normal left ventricular wall  thickness. The visual ejection fraction is  60-65%. Grade I or early diastolic  dysfunction. No regional wall motion abnormalities noted.     Right Ventricle  The right ventricle is normal in size and function.     Atria  Normal left atrial size. The right atrium is mild to moderately dilated. There  is no color Doppler evidence of an atrial shunt.     Mitral Valve  The mitral valve leaflets are mildly thickened. There is trace mitral  regurgitation.     Tricuspid Valve  There is trace tricuspid regurgitation. Right ventricular systolic pressure  could not be approximated due to inadequate tricuspid regurgitation.     Aortic Valve  There is trace to mild aortic regurgitation. The mean AoV pressure gradient is  24.1 mmHg. There is sclerosis, calcification, and restriction of the aortic  valve opening compatible with moderate aortic stenosis.     Pulmonic Valve  There is trace pulmonic valvular regurgitation.     Vessels  The aortic root is normal size. The ascending aorta is Borderline dilated.     Pericardium  There is no pericardial effusion.     Rhythm  The rhythm was sinus bradycardia.  ______________________________________________________________________________  MMode/2D Measurements & Calculations  IVSd: 1.1 cm     LVIDd: 4.8 cm  LVIDs: 2.8 cm  LVPWd: 1.1 cm  FS: 40.9 %  LV mass(C)d: 183.4 grams  LV mass(C)dI: 87.6 grams/m2  Ao root diam: 3.4 cm  asc Aorta Diam: 3.6 cm  LVOT diam: 2.2 cm  LVOT area: 3.7 cm2  RWT: 0.45     Doppler Measurements & Calculations  MV E max slade: 62.6 cm/sec  MV A max slade: 79.1 cm/sec  MV E/A: 0.79  MV dec slope: 221.5 cm/sec2  Ao V2 max: 326.8 cm/sec  Ao max P.0 mmHg  Ao V2 mean: 233.2 cm/sec  Ao mean P.1 mmHg  Ao V2 VTI: 78.0 cm  MITESH(I,D): 1.4 cm2  MITESH(V,D): 1.3 cm2  LV V1 max P.6 mmHg  LV V1 max: 117.9 cm/sec  LV V1 VTI: 30.0 cm  SV(LVOT): 111.4 ml  SI(LVOT): 53.2 ml/m2  PA V2 max: 137.1 cm/sec  PA max P.5 mmHg  PA acc time: 0.17 sec  AV Slade Ratio (DI): 0.36     MITESH Index (cm2/m2): 0.68  Lateral E/e':  8.0     ______________________________________________________________________________  Report approved by: Cezar Gonsalez 12/05/2021 03:20 PM             Labs: reviewed  Imaging: reviewed    Pb Jose MD

## 2021-12-06 NOTE — PLAN OF CARE
0700 - 1900 RN Shift Summary     No significant change compared to previous two days.    NEURO: Sedated on Midazolam and Fentanyl; Vecuronium off. Coughs with stimulation, otherwise calm and tolerates mechanical ventilation well. Pupils +2, PERRL.  CV: Sinus bradycardia; rare PVCs. Required low dose Levophed for much of then day, off since 1700. Echocardiogram completed.  RESPIRATORY: Supine at 0800; Peep lowered to 14 by MD. P/F ratio significantly decreased after supine for two hours; Reproned at 1600; FiO2 at change of shift 70%.  GI: Active bowel sounds; no BM at this time; Miralax added to bowel regimen. Blood glucose continues to be high, in the upper 200s, despite additional Lantus dose.  : Diuresed with Lasix, responded well; 1L of additional urine output in the 4 hours following Lasix.  Skin: No change;  Intact, blanchable redness on buttocks and posterior neck.  Family: Patient's suitcase and bag collected by family that came to hospital door.

## 2021-12-06 NOTE — PLAN OF CARE
Neuro: PERRL, brisk. Occasionally opens eyes to pain, no response to stimuli in extremities x4.  CV: SB, MAP >65 achieved with pressor support. Moderate edema.  Resp: CMV, LS very diminished. Minimal secretions.  GI: NPO, OGT running at goal. No BM this shift. Large residuals from OGT.  : zee, moderate UO. BG's in the high 200s, covered with novolog.  Skin: scab to nose, scattered bruising, blanchable redness to back of neck around ETT tape.  Lines: R radial art line  Access: RIJ, L PIV  Gtts: versed, fentanyl, levophed, veletri, tko  Other: prone since 12/5 1600

## 2021-12-06 NOTE — PROGRESS NOTES
CLINICAL NUTRITION SERVICES - REASSESSMENT NOTE      RECOMMENDATIONS FOR MD/PROVIDER TO ORDER: Lantus adjustments per MD    Malnutrition: (12/2)  % Weight Loss:  > 2% in 1 week (severe malnutrition)  % Intake:  </= 50% for >/= 5 days (severe malnutrition)  Subcutaneous Fat Loss:  Deferred with COVID-19 precautions   Muscle Loss:  Deferred with COVID-19 precautions   Fluid Retention: Does not meet criteria - trace generalized      Malnutrition Diagnosis: Severe malnutrition  In Context of:  Acute illness or injury       EVALUATION OF PROGRESS TOWARD GOALS   Diet:  NPO on vent     Nutrition Support:  Patient began TF on 12/2, advanced to goal on 12/4, and continues as follows ~    Nutrition Support Enteral:  Type of Feeding Tube: NG  Enteral Frequency:  Continuous  Enteral Regimen: Vital HP at 60 mL/hr  Total Enteral Provisions: 1440 kcal (15 kcal/kg), 125 g protein (2 g/kg), 1203 mL H2O, 159 g CHO, 0 g fiber   Free Water Flush: 250 mL every 4 hours       Intake/Tolerance:    Na 147 (H) - Flushes increased today   K and Phos normal  Mg 2.5 (H)  -304 with Medium sliding scale insulin and Lantus 32 units at HS --> Plan for increase after discussion in rounds to keep BG < 200 per MD   Last BM 12/3 - Patient getting PRN meds       ASSESSED NUTRITION NEEDS:  Dosing Weight   DW: 96.5 kg for energy (12/2) and 61 kg for protein (IBW)  Estimated Energy Needs: 1763-7522 kcals (14-17 Kcal/Kg)  Justification: obesity guidelines   Estimated Protein Needs:  grams protein (1.5-2 g pro/Kg)  Justification: hypercatabolism with acute illness and obesity guidelines       NEW FINDINGS:   12/2: Proned   12/3: Supine 1200 --> Proned 2000   12/4: Proned 1600   12/4:  FT tip near gastric fundus   12/5:  Supine --> Re-proned 1600   12/6:  Supine 0830   Per rounds today, plan to prone again this afternoon     Norepi and Vec off yesterday     Patient receiving Certavite daily as TF regimen meeting < 100% DRIs    Previous Goals  (12/2):   TF to begin in the next 24-48 hours   Evaluation: Met    Previous Nutrition Diagnosis (12/2):   Inadequate protein-energy intake related to NPO status with tenuous respiratory status as evidenced by meeting < 50% energy and protein needs over the past 4 days at least and suspect longer with sx onset PTA   Evaluation: Resolved       CURRENT NUTRITION DIAGNOSIS  Altered nutrition-related lab value (glucoses) related to stress, DM as evidenced by -300 range     INTERVENTIONS  Recommendations / Nutrition Prescription  Continue goal TF regimen as above   Lantus adjustments per MD     Implementation  Collaboration and Referral of Nutrition care:  Patient discussed today during interdisciplinary bedside rounds     Goals  TF will continue to meet % needs   BGM < 200     MONITORING AND EVALUATION:  Progress towards goals will be monitored and evaluated per protocol and Practice Guidelines    Loretta Ritchie RD, LD, CNSC   Clinical Dietitian - [facility]

## 2021-12-07 NOTE — PROGRESS NOTES
ICU Progress Note    ICU Day: 5  Vent Day 5  Hosp Day: 9      HOSPITAL COURSE  77 yo female with PMH of DEX, DM aortic stenosis , hypothyroidism admit 11/28 with fever, dyspnea diagnosed with COVID PNA 11/28 (home test) though 2 week of symptoms prior. Unvaccinated and did not receive monoclonal Ig. In ED sats mid 80 on RA corrected with 3L however escalating and confused.  Initially admitted to floor on BiPAP however failed and intubtated and transferred to ICU 12/1  Requiring paralysis and proning    12/1 - transfer to ICU Bipap 100% 16/10   12/2 - Intubated, lines (CVC, Art line, Ivey, OG), paralyzed and proned   P/F ratio = 78.   12/3 - Sedation and paralytic weaned d/t bradycardia. Supined at [id number]; re-proned at 2000.  12/4 - prone, supine, sugars elevated, on/off low dose levophed   12/6- pronated for ~18 hr/day all weekend, on low dose levophed. Paralytic weaned off yesterday am  12/7- Very high FSBGs despite changes to sliding scale insulin and lantus yesterday, supinated 6 hours yesterday and pronated again.       ASSESSMENT/PLAN:  76F DEX, DM2, aortic stenosis, hypothyroidism admitted 11/28 for acute hypoxemic respiratory failure 2/2 COVID-19. Transferred to ICU 12/1. Intubated 12/2.   Proned and paralyzed.     I have personally reviewed the daily labs, imaging studies, cultures and discussed the case with referring physician and consulting physicians.     Neuro  # Sedation for mechanical ventilation  # encephalopathy, metabolic  - continue fentanyl/versed for sedation,  - titrate sedation for a RASS of -4    Pulmonary  # ARDS secondary to covid 19 pneumonia  # DEX  # Acute hypoxemic respiratory failure - oxygenation, mechanics acceptable    - low tidal volume, lung protection ventilation  - continued inhaled epoprostenol   - albuterol nebs  - supinate this am, check ABG early afternoon    Cardiac  # Shock - 2/2 sepsis, sedation, obstruction 2/2 positive pressure ventilation, good markers of  perfusion, CXR enlarged silhouette -   # bradycardia  # Aortic stenosis   - wean/titrate levophed for goal MAP > 64, follow markers of perfusion, if jeannine persists consider changing to epinephrine  - aim even fluid status to net negative, will diurese this AM    Renal  # Hypernatremia - mild, stable  # cr, UOP appropriate   - strict I&O,   - lasix 40 mg IV x1  - Diamox 500 mg IV x1  - electrolyte replacement protocols   - increased free water 250 ml q4     GI  # Mildly elevated transaminases 2/2 COVID vs med, resolved  # at risk for malnutrition  - continue tube feeds  - bowel regimen, PPI     Heme/Onc  # Coagulopathy 2/2 COVID-19  - monitor CBC  - enoxaparin     ID  # COVID-19 pneumonia, completed remdesivir , 1 dose toci  - dexamethasone 12mg  - follow clinically     Endo  # DM2 w/ hyperglycemia secondary to steroids  # Hypothyroidism  - monitor BG  - resume insulin gtt  - levothyroxine supplementation    PPX  1. DVT: enoxaparin   2. VAP: HOB 30 degrees, chlorhexidine rinse  3. Stress Ulcer: PPI  4. Restraints: Nonviolent soft two point restraints required and necessary for patient safety and continued cares and good effect as patient continues to pull at necessary lines, tubes despite education and distraction. Will readdress daily.   5. Wound care - per unit routine   6. Feeding - TF  7. Family:  Will call after rounds    Pb Jose MD   SICU Fellow      Interval Hx:  Patient remains on very low dose levophed. FSBGs remain very high despite increasing Lantus and sliding scale insulin. Supinated for 6 hours yesterday    /66 (BP Location: Left arm)   Pulse (!) 42   Temp 98.1  F (36.7  C)   Resp 18   Wt 89.4 kg (197 lb 1.5 oz)   SpO2 94%   BMI 30.87 kg/m       Gen: intubated, sedated, prone, appears older than stated age  Neuro: sedated, PERRL 3--> 2mm  HEENT: anicteric, ETT in place, conjunctiva pink  Card: jeannine sinus, visible p waves on tele  Pulm: anteriorly clear , muffled breath sounds on  auscultation synchronous with vent   Abd: soft, nontender, no palpable masses  MSK: trace edema, warm   Skin: no obvious rash     Ventilation Mode: CMV/AC  (Continuous Mandatory Ventilation/ Assist Control)  FiO2 (%): 50 %  Rate Set (breaths/minute): 18 breaths/min  Tidal Volume Set (mL): 460 mL  PEEP (cm H2O): 14 cmH2O  Oxygen Concentration (%): 55 %  Peak Inspiratory Pressure (cm H2O) (Drager Tri): 26  Resp: 18        Intake/Output Summary (Last 24 hours) at 12/5/2021 0913  Last data filed at 12/5/2021 0600  Gross per 24 hour   Intake 2353.95 ml   Output 1165 ml   Net 1188.95 ml     ROUTINE ICU LABS (Last four results)  CMP  Recent Labs   Lab 12/07/21  0852 12/07/21  0420 12/07/21  0023 12/06/21  0904 12/06/21  0419 12/05/21  0419 12/05/21  0417 12/04/21  0409 12/04/21  0406 12/03/21  0835 12/03/21  0435 12/02/21  1629 12/02/21  1048   NA  --  148*  --   --  147*  --  148*  --  145*  --  144  --   --    POTASSIUM  --  4.7  --   --  4.8  --  4.9  --  4.3  --  4.3  --   --    CHLORIDE  --  110*  --   --  112*  --  116*  --  113*  --  113*  --   --    CO2  --  35*  --   --  33*  --  32  --  29  --  28  --   --    ANIONGAP  --  3  --   --  2*  --  <1*  --  3  --  3  --   --    * 266*  238* 281*   < > 289*   < > 297*   < > 279*   < > 204*   < >  --    BUN  --  46*  --   --  39*  --  36*  --  29  --  19  --   --    CR  --  0.59  --   --  0.62  --  0.57  --  0.68  --  0.64  --   --    GFRESTIMATED  --  90  --   --  88  --  >90  --  86  --  88  --   --    LEENA  --  8.4*  --   --  8.3*  --  8.4*  --  8.2*  --  8.1*  --   --    MAG  --   --   --   --  2.5*  --   --   --  2.8*  --  2.5*  --  2.7*   PHOS  --  2.9  --   --  2.9  --  2.6  --  3.0  --  2.8  --  2.4*   PROTTOTAL  --   --   --   --  5.7*  --  5.9*  --  6.1*  --  6.2*  --  7.1   ALBUMIN  --   --   --   --  1.9*  --  1.8*  --  1.8*  --  2.0*  --  2.3*   BILITOTAL  --   --   --   --  0.4  --  0.2  --  0.1*  --  0.3  --  0.4   ALKPHOS  --   --   --   --  40   --  45  --  49  --  50  --  55   AST  --   --   --   --  30  --  21  --  23  --  39  --  58*   ALT  --   --   --   --  38  --  25  --  25  --  29  --  37    < > = values in this interval not displayed.     CBC  Recent Labs   Lab 12/07/21 0420 12/06/21 0419 12/05/21 0417 12/04/21  0406   WBC 8.8 8.0 7.5 7.2   RBC 4.13 4.19 4.27 4.48   HGB 11.7 12.0 12.2 12.9   HCT 38.4 38.9 39.3 40.6   MCV 93 93 92 91   MCH 28.3 28.6 28.6 28.8   MCHC 30.5* 30.8* 31.0* 31.8   RDW 14.1 14.2 14.4 14.1    317 324 306     INR  No lab results found in last 7 days.  Arterial Blood Gas  Recent Labs   Lab 12/07/21 0420 12/06/21  0936 12/06/21 0419 12/05/21  1049   PH 7.44 7.45 7.42 7.39   PCO2 56* 52* 55* 55*   PO2 102 69* 99 83   HCO3 38* 36* 35* 33*   O2PER 55 80 60 100     No results found for this or any previous visit (from the past 24 hour(s)).    Labs: reviewed  Imaging: reviewed    Pb Jose MD

## 2021-12-07 NOTE — PROGRESS NOTES
Date of Admission: 11/28/2021     Date of Intubation (most recent):12/2/2021     Reason for Mechanical Ventilation:Respiratory failure     Number of Days on Mechanical Ventilation:6     Met Criteria for Spontaneous Breathing Trial:No     Reason for No Spontaneous Breathing Trial:Patient is on a PEEP of 14 and full strength Veletri is running.  Recent Labs   Lab 12/06/21  0936 12/06/21  0419 12/05/21  1049 12/05/21  0418   PH 7.45 7.42 7.39 7.39   PCO2 52* 55* 55* 52*   PO2 69* 99 83 111*   HCO3 36* 35* 33* 31*   O2PER 80 60 100 60   Ventilation Mode: CMV/AC  (Continuous Mandatory Ventilation/ Assist Control)  FiO2 (%): 60 %  Rate Set (breaths/minute): 18 breaths/min  Tidal Volume Set (mL): 460 mL  PEEP (cm H2O): 14 cmH2O  Oxygen Concentration (%): 55 %  Peak Inspiratory Pressure (cm H2O) (Drager Tri): 26  Resp: 18    Plan: to monitor pt on full vent support Proned.

## 2021-12-07 NOTE — PLAN OF CARE
0700 - 2300 RN Shift Summary      NEURO: Sedated on Midazolam and Fentanyl; Opens eye and weak cough with stimulation, tolerates mechanical ventilation well. Pupils +2, PERRL; No withdrawal pain, does not follow commands.  CV: Sinus bradycardia; rare PVCs. Requires very low dose Levophed at change of shift.  RESPIRATORY: Supine at 0800 P/F ratio once again significantly decreased after supine for one hour; Reproned at 1400;  GI: Active bowel sounds; no BM at this time, stool softeners given; bood glucose in the upper 200s, lower 300s. Lantus dose increased, sliding scale now high resistance per MD order. Free water increased to 150 due to high sodium.   : Again diuresed with Lasix, responded well, great urine output.  Skin: No change;  Intact, blanchable redness on buttocks and posterior neck.  Family: Updated by MD.

## 2021-12-07 NOTE — PROGRESS NOTES
[facility] ICU RESPIRATORY NOTE        Date of Admission: 11/28/2021    Date of Intubation (most recent):12/2/2021    Reason for Mechanical Ventilation:Respiratory failure    Number of Days on Mechanical Ventilation:5    Met Criteria for Spontaneous Breathing Trial:No    Reason for No Spontaneous Breathing Trial:Patient is on a PEEP of 14 and full strength Veletri is running.      Prone Positioning Note    Frequency of head turns:Q2H  Was patient placed supine today:Yes. Pt was supined at 0845 and then re-proned at 1500.    ETT/Skin assessment:    Was ETT repositioned and re-taped today:Yes    Skin assessment around ETT:Intact  Skin barriers used:Cavilon and mepilex in place    Plan:Plan for pt to be in the prone position overnight with Q2H head turns.        ABG Results:   Recent Labs   Lab 12/06/21  0936 12/06/21  0419 12/05/21  1049 12/05/21  0418   PH 7.45 7.42 7.39 7.39   PCO2 52* 55* 55* 52*   PO2 69* 99 83 111*   HCO3 36* 35* 33* 31*   O2PER 80 60 100 60       Current Vent Settings: Ventilation Mode: CMV/AC  (Continuous Mandatory Ventilation/ Assist Control)  FiO2 (%): 60 %  Rate Set (breaths/minute): 18 breaths/min  Tidal Volume Set (mL): 460 mL  PEEP (cm H2O): 14 cmH2O  Oxygen Concentration (%): 60 %  Peak Inspiratory Pressure (cm H2O) (Drager Tri): 26  Resp: 18    Plan:Will cont full vent support for now and will assess for weaning readiness daily.    Olive Whitten, RT

## 2021-12-07 NOTE — PROGRESS NOTES
"SPIRITUAL HEALTH SERVICES Progress Note  ICU    Referral Source: Initial Outreach to Family    I shared a reflective phone call with pt Mavis's daughter, Sandra today integrating elements of illness and personal narratives.     Illness Narrative - Sandra welcomed the call. She was tearful at times, sharing how difficult this time has been. She shares she also got ill with a breakthrough case when caring for her mom.     Distress - Sandra expressed her grief and distress over her mom's illness. She shares it has been hard for her family that her mom did not get vaccinated and \"wasn't a big fan of masks.\" She share she has a brother in [place] and one in [facility] who is flying in so family can be together during this time.     Coping - Sandra names their family is very close and are mutually supporting one another. She shares they have set up the ipads for visits which has been of great comfort. She shares their [employer] molly is deeply important to them and asked for someone to visit her mom for prayer.     Meaning-Making - Sandra spent some time engaging in life review about Mavis. She shares her mom is \"very busy\" and lives a very active life in the community and socially. She shares she ran an [employer] for many years and their family's [place] roots and [employer] molly are very important to them.    I spent time with Sandra offering emotional and spiritual support through reflective listening that affirmed emotions, experiences and meaning.    Later in the day, per family's request I visited Mavis for prayer at bedside and brought a comfort blanket.     Plan -  will continue to follow.     Claudine Marx  Associate       "

## 2021-12-07 NOTE — PROGRESS NOTES
[facility] ICU RESPIRATORY NOTE        Date of Admission: 11/28/2021    Date of Intubation (most recent): 12/2/2021.    Reason for Mechanical Ventilation: Respiratory Failure.    Number of Days on Mechanical Ventilation: 6 days.    Met Criteria for Spontaneous Breathing Trial: No    Reason for No Spontaneous Breathing Trial: PEEP>8 cm H2O, PEEP >50%.     Significant Events Today: Patient supined at 1000. ABG re-drawn and sent to lab. Results indicated patient is okay to stay supine at this time.     ABG Results:   Recent Labs   Lab 12/07/21  1301 12/07/21  0420 12/06/21  0936 12/06/21  0419   PH 7.45 7.44 7.45 7.42   PCO2 54* 56* 52* 55*   PO2 102 102 69* 99   HCO3 38* 38* 36* 35*   O2PER 60 55 80 60       Current Vent Settings: Ventilation Mode: CMV/AC  (Continuous Mandatory Ventilation/ Assist Control)  FiO2 (%): 60 %  Rate Set (breaths/minute): 18 breaths/min  Tidal Volume Set (mL): 460 mL  PEEP (cm H2O): 14 cmH2O  Oxygen Concentration (%): 100 % (with head turn)  Peak Inspiratory Pressure (cm H2O) (Drager Tri): 26  Resp: 18      Skin Assessment: Clean/Dry/intact.    Plan: Continue to monitor patient on full ventilatory support. Assess for weaning as tolerated. Continue Q2 head turns while patient is proned. Continue Q4 skin assessment checks. RT to continue to follow.    Alexei Lucas, RT

## 2021-12-08 NOTE — PROGRESS NOTES
ICU Progress Note    ICU Day: 5  Vent Day 5  Hosp Day: 10      HOSPITAL COURSE  77 yo female with PMH of DEX, DM aortic stenosis , hypothyroidism admit 11/28 with fever, dyspnea diagnosed with COVID PNA 11/28 (home test) though 2 week of symptoms prior. Unvaccinated and did not receive monoclonal Ig. In ED sats mid 80 on RA corrected with 3L however escalating and confused.  Initially admitted to floor on BiPAP however failed and intubtated and transferred to ICU 12/1  Requiring paralysis and proning    12/1 - transfer to ICU Bipap 100% 16/10   12/2 - Intubated, lines (CVC, Art line, Ivey, OG), paralyzed and proned   P/F ratio = 78.   12/3 - Sedation and paralytic weaned d/t bradycardia. Supined at 4904-7519; re-proned at 2000.  12/4 - prone, supine, sugars elevated, on/off low dose levophed   12/6- pronated for ~18 hr/day all weekend, on low dose levophed. Paralytic weaned off yesterday am  12/7- Very high FSBGs despite changes to sliding scale insulin and lantus yesterday, supinated 6 hours yesterday and pronated again.   12/8- supinated for 24 hours, PF ratio >150      ASSESSMENT/PLAN:  76F DEX, DM2, aortic stenosis, hypothyroidism admitted 11/28 for acute hypoxemic respiratory failure 2/2 COVID-19. Transferred to ICU 12/1. Intubated 12/2.   Proned and paralyzed. Decadron and Toci    I have personally reviewed the daily labs, imaging studies, cultures and discussed the case with referring physician and consulting physicians.     Neuro  # Sedation for mechanical ventilation  # encephalopathy, metabolic  - continue fentanyl/versed for sedation,  - titrate sedation for a RASS of -4    Pulmonary  # ARDS secondary to covid 19 pneumonia  # DEX  # Acute hypoxemic respiratory failure - oxygenation, mechanics acceptable    Ventilation Mode: CMV/AC  (Continuous Mandatory Ventilation/ Assist Control)  FiO2 (%): 60 %  Rate Set (breaths/minute): 18 breaths/min  Tidal Volume Set (mL): 460 mL  PEEP (cm H2O): 14 cmH2O  Oxygen  Concentration (%): 65 %  Resp: 24    - low tidal volume, lung protection ventilation  - continued inhaled epoprostenol   - albuterol nebs  - decrease veletri    Cardiac  # Shock - 2/2 sepsis, sedation, obstruction 2/2 positive pressure ventilation, good markers of perfusion, CXR enlarged silhouette -   # bradycardia  # Aortic stenosis   - wean/titrate levophed for goal MAP > 64, follow markers of perfusion, if jeannine persists consider changing to epinephrine  - aim even fluid status to net negative, will diurese this AM    Renal  # Hypernatremia - mild, improving  # cr, UOP appropriate   - strict I&O,   - lasix 40 mg IV x1  - Diamox 500 mg IV x1  - electrolyte replacement protocols   - increased free water 250 ml q4     GI  # Mildly elevated transaminases 2/2 COVID vs med, resolved  # at risk for malnutrition  - continue tube feeds  - bowel regimen, PPI     Heme/Onc  # Coagulopathy 2/2 COVID-19  - monitor CBC  - enoxaparin     ID  # COVID-19 pneumonia, completed remdesivir , 1 dose toci  - dexamethasone 12mg  - follow clinically     Endo  # DM2 w/ hyperglycemia secondary to steroids  # Hypothyroidism  - monitor BG  - resume insulin gtt  - levothyroxine supplementation    PPX  1. DVT: enoxaparin   2. VAP: HOB 30 degrees, chlorhexidine rinse  3. Stress Ulcer: PPI  4. Restraints: Nonviolent soft two point restraints required and necessary for patient safety and continued cares and good effect as patient continues to pull at necessary lines, tubes despite education and distraction. Will readdress daily.   5. Wound care - per unit routine   6. Feeding - TF  7. Family:  Will call after rounds    Pb Jose MD   SICU Fellow      Interval Hx:  Patient remains on very low dose levophed. FSBGs remain very high despite increasing Lantus and sliding scale insulin. Supinated for 6 hours yesterday    /54 (BP Location: Left arm)   Pulse 56   Temp 98.6  F (37  C)   Resp 24   Wt 84.8 kg (186 lb 15.2 oz)   SpO2 94%    BMI 29.28 kg/m       Gen: intubated, sedated, prone, appears older than stated age  Neuro: sedated, PERRL 3--> 2mm  HEENT: anicteric, ETT in place, conjunctiva pink  Card: jeannine sinus, visible p waves on tele  Pulm: anteriorly clear , muffled breath sounds on auscultation synchronous with vent   Abd: soft, nontender, no palpable masses  MSK: trace edema, warm   Skin: no obvious rash     Ventilation Mode: CMV/AC  (Continuous Mandatory Ventilation/ Assist Control)  FiO2 (%): 60 %  Rate Set (breaths/minute): 18 breaths/min  Tidal Volume Set (mL): 460 mL  PEEP (cm H2O): 14 cmH2O  Oxygen Concentration (%): 65 %  Resp: 24        Intake/Output Summary (Last 24 hours) at 12/5/2021 0913  Last data filed at 12/5/2021 0600  Gross per 24 hour   Intake 2353.95 ml   Output 1165 ml   Net 1188.95 ml     ROUTINE ICU LABS (Last four results)  CMP  Recent Labs   Lab 12/08/21  0800 12/08/21  0548 12/08/21  0433 12/08/21  0422 12/07/21  0852 12/07/21  0420 12/06/21  0904 12/06/21  0419 12/05/21  0419 12/05/21  0417 12/04/21  0409 12/04/21  0406 12/03/21  0835 12/03/21  0435 12/02/21  1629 12/02/21  1048   NA  --   --  145*  --   --  148*  --  147*  --  148*  --  145*  --  144  --   --    POTASSIUM  --   --  4.6  --   --  4.7  --  4.8  --  4.9  --  4.3  --  4.3  --   --    CHLORIDE  --   --  110*  --   --  110*  --  112*  --  116*  --  113*  --  113*  --   --    CO2  --   --  33*  --   --  35*  --  33*  --  32  --  29  --  28  --   --    ANIONGAP  --   --  2*  --   --  3  --  2*  --  <1*  --  3  --  3  --   --    * 136* 170* 165*   < > 266*  238*   < > 289*   < > 297*   < > 279*   < > 204*   < >  --    BUN  --   --  49*  --   --  46*  --  39*  --  36*  --  29  --  19  --   --    CR  --   --  0.65  --   --  0.59  --  0.62  --  0.57  --  0.68  --  0.64  --   --    GFRESTIMATED  --   --  87  --   --  90  --  88  --  >90  --  86  --  88  --   --    LEENA  --   --  8.4*  --   --  8.4*  --  8.3*  --  8.4*  --  8.2*  --  8.1*  --   --     MAG  --   --   --   --   --   --   --  2.5*  --   --   --  2.8*  --  2.5*  --  2.7*   PHOS  --   --  3.9  --   --  2.9  --  2.9  --  2.6  --  3.0  --  2.8  --  2.4*   PROTTOTAL  --   --   --   --   --   --   --  5.7*  --  5.9*  --  6.1*  --  6.2*  --  7.1   ALBUMIN  --   --   --   --   --   --   --  1.9*  --  1.8*  --  1.8*  --  2.0*  --  2.3*   BILITOTAL  --   --   --   --   --   --   --  0.4  --  0.2  --  0.1*  --  0.3  --  0.4   ALKPHOS  --   --   --   --   --   --   --  40  --  45  --  49  --  50  --  55   AST  --   --   --   --   --   --   --  30  --  21  --  23  --  39  --  58*   ALT  --   --   --   --   --   --   --  38  --  25  --  25  --  29  --  37    < > = values in this interval not displayed.     CBC  Recent Labs   Lab 12/08/21 0434 12/07/21 0420 12/06/21 0419 12/05/21 0417   WBC 11.0 8.8 8.0 7.5   RBC 4.26 4.13 4.19 4.27   HGB 12.4 11.7 12.0 12.2   HCT 39.8 38.4 38.9 39.3   MCV 93 93 93 92   MCH 29.1 28.3 28.6 28.6   MCHC 31.2* 30.5* 30.8* 31.0*   RDW 13.9 14.1 14.2 14.4    318 317 324     INR  No lab results found in last 7 days.  Arterial Blood Gas  Recent Labs   Lab 12/08/21  0435 12/07/21  1301 12/07/21  0420 12/06/21  0936   PH 7.41 7.45 7.44 7.45   PCO2 52* 54* 56* 52*   PO2 112* 102 102 69*   HCO3 33* 38* 38* 36*   O2PER 65 60 55 80     No results found for this or any previous visit (from the past 24 hour(s)).    Labs: reviewed  Imaging: reviewed    Pb Jose MD   SICU Fellow

## 2021-12-08 NOTE — PROGRESS NOTES
[facility] ICU RESPIRATORY NOTE           Date of Admission: 11/28/2021     Date of Intubation (most recent): 12/2/2021.     Reason for Mechanical Ventilation: Respiratory Failure.     Number of Days on Mechanical Ventilation: 7 days.     Met Criteria for Spontaneous Breathing Trial: No     Reason for No Spontaneous Breathing Trial: PEEP>8 cm H2O, PEEP >50%.      Significant Events Today: none.  Recent Labs   Lab 12/07/21  1301 12/07/21  0420 12/06/21  0936 12/06/21  0419   PH 7.45 7.44 7.45 7.42   PCO2 54* 56* 52* 55*   PO2 102 102 69* 99   HCO3 38* 38* 36* 35*   O2PER 60 55 80 60   Ventilation Mode: CMV/AC  (Continuous Mandatory Ventilation/ Assist Control)  FiO2 (%): 65 %  Rate Set (breaths/minute): 18 breaths/min  Tidal Volume Set (mL): 460 mL  PEEP (cm H2O): 14 cmH2O  Oxygen Concentration (%): 60 %  Resp: 18    Plan: to monitor pt on full vent support.

## 2021-12-08 NOTE — PLAN OF CARE
Shift 1170-9013: VSS. Neuro: NIKOLAS orientation, does not follow, does not withdraw. Pulm: Lungs: diminished throughout, mechanically ventilated. GI/: BS hypo, no BM; Ivey in place: WDL output. Diet: continuous enteral feedings.  Access: A-line in place, CVC, PIV; versed gtt, fentanyl gtt, levo gtt, NS for TKO. No pain per CPOT.

## 2021-12-08 NOTE — PLAN OF CARE
Pt supined at 1000. Repeat ABG 3 hours after being supine shows no change. Patient will be left supine overnight and another ABG will be checked in the AM.     N:Patient opens eyes to vigorous stimulus/pain but otherwise unresponive. PEERLA.  CV: SB 40-50's. Low dose levophed to keep MAP>65. Afebrile.  LS: 60% PEEP 14. LS coarse. Small amount secretions.   GI/: TF at goal-increased FWF today for hypernatremia. Ivey with adequate output. No BM today-consider supp.  IV: Levo, versed, fentanyl, and insulin infusing through CVC. R radial daron. L PIV SL.   Family updated by MD today.

## 2021-12-09 NOTE — PLAN OF CARE
Shift 2975-2100: VSS. Neuro: NIKOLAS orientation, does not follow, does not withdraw. Pulm: Lungs: diminished throughout, mechanically ventilated. GI/: BS hypo, no BM; Ivey in place: WDL output. Diet: continuous enteral feedings.  Access: A-line in place, CVC, PIV; versed gtt, fentanyl gtt, levo gtt, NS for TKO. No pain per CPOT.

## 2021-12-09 NOTE — PROGRESS NOTES
CLINICAL NUTRITION SERVICES - REASSESSMENT NOTE      Recommendations Ordered by Registered Dietitian (RD): Change TF regimen to Osmolite 1.5 at 60 mL/hr x 22 hours per day (1320 mL/day) to provide:  1980 kcal (30 kcal/kg), 83 g protein (1.2 g/kg), 269 g CHO, 0 g fiber, 1006 mL H2O  Discontinue the Certavite as no longer needed    Malnutrition: (12/2)  % Weight Loss:  > 2% in 1 week (severe malnutrition)  % Intake:  </= 50% for >/= 5 days (severe malnutrition)  Subcutaneous Fat Loss:  Deferred with COVID-19 precautions   Muscle Loss:  Deferred with COVID-19 precautions   Fluid Retention: Does not meet criteria - trace generalized      Malnutrition Diagnosis: Severe malnutrition  In Context of:  Acute illness or injury        EVALUATION OF PROGRESS TOWARD GOALS   Diet:  NPO on vent     Nutrition Support:  Patient continues on goal TF regimen as follows ~    Nutrition Support Enteral:  Type of Feeding Tube: NG  Enteral Frequency:  Continuous  Enteral Regimen: Vital HP at 60 mL/hr  Total Enteral Provisions: 1440 kcal (15 kcal/kg and 85% needs), 125 g protein (2 g/kg and 125% needs), 1203 mL H2O, 159 g CHO, 0 g fiber   Free Water Flush: 100 mL every 4 hours (reduced today)      Intake/Tolerance:    K and Phos normal  Mg 2.9 (H)  BGM < 180 - Medium sliding scale insulin   BM x 1 today and x 3 yesterday - Getting Miralax, Dulcolax given yesterday (was previously constipated)  I/O 3635/2890, wt 85.3 kg (down 17.6 kg from admit) - Likely hypermetabolism with COVID diagnosis, patient has also received several doses of Lasix since admit --> Now with BMI of 29.45 kg/m^2 (140% IBW)      ASSESSED NUTRITION NEEDS:  Dosing Weight   DW: 67 kg (adjusted)  Energy Needs: 4473-8469 kcals (25-30 Kcal/Kg) - overweight on vent    Protein Needs:  grams protein (1.2-1.5 g pro/Kg) - hypercatabolism with critical illness     NEW FINDINGS:   12/6:  Supine 0830 --> Prone at 1400   12/7:  Supine     Patient receiving Levothyroxine via FT  --> Need to hold TF 1 hour before and 1 hour after dose     Patient is on a Norepi drip     Previous Goals (12/6):   TF will continue to meet % needs - Not met.   BGM < 200 - Met.     Previous Nutrition Diagnosis (12/6):   Altered nutrition-related lab value (glucoses) related to stress, DM as evidenced by -300 range   Evaluation: Resolved       CURRENT NUTRITION DIAGNOSIS  Inadequate energy intake related to TF at 60 mL/hr, estimated needs adjusted due to weight drop as evidenced by meeting 85% re-estimated energy needs     INTERVENTIONS  Recommendations / Nutrition Prescription  Change TF regimen to Osmolite 1.5 at 60 mL/hr x 22 hours per day (1320 mL/day) to provide:  1980 kcal (30 kcal/kg), 83 g protein (1.2 g/kg), 269 g CHO, 0 g fiber, 1006 mL H2O  Discontinue the Certavite as no longer needed     Implementation  EN Composition, Multivitamin/Mineral:  Above TF orders written + Certavite discontinued   Collaboration and Referral of Nutrition care:  Patient discussed today during interdisciplinary bedside rounds     Goals  TF will meet % needs    MONITORING AND EVALUATION:  Progress towards goals will be monitored and evaluated per protocol and Practice Guidelines    Loretta Ritchie, RD, LD, CNSC   Clinical Dietitian - [facility]

## 2021-12-09 NOTE — PROGRESS NOTES
[facility] ICU RESPIRATORY NOTE        Date of Admission: 11/28/2021    Date of Intubation (most recent): 12/2/21    Reason for Mechanical Ventilation: Respiratory Failure    Number of Days on Mechanical Ventilation: 8    Met Criteria for Spontaneous Breathing Trial: no    Reason for No Spontaneous Breathing Trial: PEEP>8    Significant Events Today: none    ABG Results:   Recent Labs   Lab 12/09/21  0417 12/08/21  0435 12/07/21  1301 12/07/21  0420   PH 7.41 7.41 7.45 7.44   PCO2 54* 52* 54* 56*   PO2 94 112* 102 102   HCO3 34* 33* 38* 38*   O2PER 60 65 60 55       Current Vent Settings: Ventilation Mode: CMV/AC  (Continuous Mandatory Ventilation/ Assist Control)  FiO2 (%): 55 %  Rate Set (breaths/minute): 18 breaths/min  Tidal Volume Set (mL): 460 mL  PEEP (cm H2O): 14 cmH2O  Oxygen Concentration (%): 55 %  Resp: (!) 37      Skin Assessment: intact    Plan: Continue on full ventilatory support. Wean as able.    Philip Young, RT

## 2021-12-09 NOTE — PROGRESS NOTES
Date of Admission: 11/28/2021     Date of Intubation (most recent):12/2/2021     Reason for Mechanical Ventilation:Respiratory failure     Number of Days on Mechanical Ventilation:7     Met Criteria for Spontaneous Breathing Trial:No     Reason for No Spontaneous Breathing Trial:Patient is on a PEEP of 14 and full strength Veletri is running.  Recent Labs   Lab 12/08/21  0435 12/07/21  1301 12/07/21  0420 12/06/21  0936   PH 7.41 7.45 7.44 7.45   PCO2 52* 54* 56* 52*   PO2 112* 102 102 69*   HCO3 33* 38* 38* 36*   O2PER 65 60 55 80   Ventilation Mode: CMV/AC  (Continuous Mandatory Ventilation/ Assist Control)  FiO2 (%): 60 %  Rate Set (breaths/minute): 18 breaths/min  Tidal Volume Set (mL): 460 mL  PEEP (cm H2O): 14 cmH2O  Oxygen Concentration (%): 60 %  Resp: 14    Plan to monitor pt on full vent support.

## 2021-12-09 NOTE — PROGRESS NOTES
ICU Progress Note    ICU Day: 5  Vent Day 5  Hosp Day: 11      HOSPITAL COURSE  77 yo female with PMH of DEX, DM aortic stenosis , hypothyroidism admit 11/28 with fever, dyspnea diagnosed with COVID PNA 11/28 (home test) though 2 week of symptoms prior. Unvaccinated and did not receive monoclonal Ig. In ED sats mid 80 on RA corrected with 3L however escalating and confused.  Initially admitted to floor on BiPAP however failed and intubtated and transferred to ICU 12/1  Requiring paralysis and proning    12/1 - transfer to ICU Bipap 100% 16/10   12/2 - Intubated, lines (CVC, Art line, Ivey, OG), paralyzed and proned   P/F ratio = 78.   12/3 - Sedation and paralytic weaned d/t bradycardia. Supined at 4758-0707; re-proned at 2000.  12/4 - prone, supine, sugars elevated, on/off low dose levophed   12/6- pronated for ~18 hr/day all weekend, on low dose levophed. Paralytic weaned off yesterday am  12/7- Very high FSBGs despite changes to sliding scale insulin and lantus yesterday, supinated 6 hours yesterday and pronated again.   12/8- supinated for 24 hours, PF ratio >150      ASSESSMENT/PLAN:  76F DEX, DM2, aortic stenosis, hypothyroidism admitted 11/28 for acute hypoxemic respiratory failure 2/2 COVID-19. Transferred to ICU 12/1. Intubated 12/2.   Proned and paralyzed. Decadron and Toci    I have personally reviewed the daily labs, imaging studies, cultures and discussed the case with referring physician and consulting physicians.     Neuro  # Sedation for mechanical ventilation  # encephalopathy, metabolic  - continue fentanyl/versed for sedation,  - titrate sedation for a RASS of -4    Pulmonary  # ARDS secondary to covid 19 pneumonia  # DEX  # Acute hypoxemic respiratory failure - oxygenation, mechanics acceptable    Ventilation Mode: CMV/AC  (Continuous Mandatory Ventilation/ Assist Control)  FiO2 (%): 60 %  Rate Set (breaths/minute): 18 breaths/min  Tidal Volume Set (mL): 460 mL  PEEP (cm H2O): 14 cmH2O  Oxygen  Concentration (%): 60 %  Resp: (!) 37    - low tidal volume, lung protection ventilation  - continued inhaled epoprostenol   - albuterol nebs  - remain on half strength veletri  - wean FiO2 as toleratd    Cardiac  # Shock - 2/2 sepsis, sedation, obstruction 2/2 positive pressure ventilation, good markers of perfusion, CXR enlarged silhouette -   # bradycardia  # Aortic stenosis   - wean/titrate levophed for goal MAP > 64, follow markers of perfusion, if jeannine persists will consider changing to epinephrine  - aim even fluid status to net negative, continue to diurese this AM    Renal  # Hypernatremia - resolved  # cr, UOP appropriate   - strict I&O,   - lasix 40 mg IV x1  - electrolyte replacement protocols   - decrease free water 100 ml q4     GI  # Mildly elevated transaminases 2/2 COVID vs med, resolved  # at risk for malnutrition  - continue tube feeds  - bowel regimen, PPI     Heme/Onc  # Coagulopathy 2/2 COVID-19  - monitor CBC  - enoxaparin     ID  # COVID-19 pneumonia, completed remdesivir , 1 dose toci  - dexamethasone 12mg, finished  - follow clinically     Endo  # DM2 w/ hyperglycemia secondary to steroids, improving now that steroids are finished  # Hypothyroidism  - monitor BG  - transition to SSI  - levothyroxine supplementation    PPX  1. DVT: enoxaparin   2. VAP: HOB 30 degrees, chlorhexidine rinse  3. Stress Ulcer: PPI  4. Restraints: Nonviolent soft two point restraints required and necessary for patient safety and continued cares and good effect as patient continues to pull at necessary lines, tubes despite education and distraction. Will readdress daily.   5. Wound care - per unit routine   6. Feeding - TF  7. Family:  Will call after rounds    Pb Jose MD   SICU Fellow      Interval Hx:  Patient remains on very low dose levophed. Remained pronated yesterday, veletri halved.     /54 (BP Location: Left arm)   Pulse 51   Temp 97.7  F (36.5  C) (Bladder)   Resp (!) 37   Wt 85.3 kg  (188 lb 0.8 oz)   SpO2 96%   BMI 29.45 kg/m       Gen: intubated, sedated, prone, appears older than stated age  Neuro: sedated, PERRL 3--> 2mm  HEENT: anicteric, ETT in place, conjunctiva pink  Card: jeannine sinus, visible p waves on tele  Pulm: anteriorly clear , muffled breath sounds on auscultation synchronous with vent   Abd: soft, nontender, no palpable masses  MSK: trace edema, warm   Skin: no obvious rash     Ventilation Mode: CMV/AC  (Continuous Mandatory Ventilation/ Assist Control)  FiO2 (%): 60 %  Rate Set (breaths/minute): 18 breaths/min  Tidal Volume Set (mL): 460 mL  PEEP (cm H2O): 14 cmH2O  Oxygen Concentration (%): 60 %  Resp: (!) 37        Intake/Output Summary (Last 24 hours) at 12/5/2021 0913  Last data filed at 12/5/2021 0600  Gross per 24 hour   Intake 2353.95 ml   Output 1165 ml   Net 1188.95 ml     ROUTINE ICU LABS (Last four results)  CMP  Recent Labs   Lab 12/09/21  0806 12/09/21  0609 12/09/21  0416 12/09/21  0414 12/08/21  0548 12/08/21  0433 12/07/21  0852 12/07/21  0420 12/06/21  0904 12/06/21  0419 12/05/21  0419 12/05/21  0417 12/04/21  0409 12/04/21  0406 12/03/21  0835 12/03/21  0435   NA  --   --  144  --   --  145*  --  148*  --  147*  --  148*  --  145*  --  144   POTASSIUM  --   --  4.1  --   --  4.6  --  4.7  --  4.8  --  4.9  --  4.3  --  4.3   CHLORIDE  --   --  109  --   --  110*  --  110*  --  112*  --  116*  --  113*  --  113*   CO2  --   --  35*  --   --  33*  --  35*  --  33*  --  32  --  29  --  28   ANIONGAP  --   --  <1*  --   --  2*  --  3  --  2*  --  <1*  --  3  --  3   * 135* 75 80   < > 170*   < > 266*  238*   < > 289*   < > 297*   < > 279*   < > 204*   BUN  --   --  54*  --   --  49*  --  46*  --  39*  --  36*  --  29  --  19   CR  --   --  0.69  --   --  0.65  --  0.59  --  0.62  --  0.57  --  0.68  --  0.64   GFRESTIMATED  --   --  85  --   --  87  --  90  --  88  --  >90  --  86  --  88   LEENA  --   --  8.1*  --   --  8.4*  --  8.4*  --  8.3*  --  8.4*   --  8.2*  --  8.1*   MAG  --   --  2.9*  --   --   --   --   --   --  2.5*  --   --   --  2.8*  --  2.5*   PHOS  --   --  3.8  --   --  3.9  --  2.9  --  2.9  --  2.6  --  3.0  --  2.8   PROTTOTAL  --   --   --   --   --   --   --   --   --  5.7*  --  5.9*  --  6.1*  --  6.2*   ALBUMIN  --   --   --   --   --   --   --   --   --  1.9*  --  1.8*  --  1.8*  --  2.0*   BILITOTAL  --   --   --   --   --   --   --   --   --  0.4  --  0.2  --  0.1*  --  0.3   ALKPHOS  --   --   --   --   --   --   --   --   --  40  --  45  --  49  --  50   AST  --   --   --   --   --   --   --   --   --  30  --  21  --  23  --  39   ALT  --   --   --   --   --   --   --   --   --  38  --  25  --  25  --  29    < > = values in this interval not displayed.     CBC  Recent Labs   Lab 12/09/21 0416 12/08/21 0434 12/07/21  0420 12/06/21  0419   WBC 11.4* 11.0 8.8 8.0   RBC 4.13 4.26 4.13 4.19   HGB 12.0 12.4 11.7 12.0   HCT 38.7 39.8 38.4 38.9   MCV 94 93 93 93   MCH 29.1 29.1 28.3 28.6   MCHC 31.0* 31.2* 30.5* 30.8*   RDW 14.1 13.9 14.1 14.2    330 318 317     INR  No lab results found in last 7 days.  Arterial Blood Gas  Recent Labs   Lab 12/09/21 0417 12/08/21 0435 12/07/21  1301 12/07/21  0420   PH 7.41 7.41 7.45 7.44   PCO2 54* 52* 54* 56*   PO2 94 112* 102 102   HCO3 34* 33* 38* 38*   O2PER 60 65 60 55     No results found for this or any previous visit (from the past 24 hour(s)).    Labs: reviewed  Imaging: reviewed    Pb Jose MD   SICU Fellow

## 2021-12-10 NOTE — PLAN OF CARE
Pt remains intubated and sedated. RASS -4. SB. Tmax 38.0. MAP>65 w/ levophed. CMV 50% fio2/18RR/14peep/460 volume. Scant secretions. TF@ goal. 100 h2o q4h. No BM. Ivey with adequate output. No skin issues. Kaur Azar RN 12/09/21 6:45 PM

## 2021-12-10 NOTE — PROGRESS NOTES
[facility] ICU RESPIRATORY NOTE        Date of Admission: 11/28/2021    Date of Intubation (most recent): 12/2/21    Reason for Mechanical Ventilation: respiratory failure     Number of Days on Mechanical Ventilation: 9    Met Criteria for Spontaneous Breathing Trial: No    Reason for No Spontaneous Breathing Trial: Peep of 14     Significant Events Today: none     ABG Results:   Recent Labs   Lab 12/10/21  0401 12/09/21  0417 12/08/21  0435 12/07/21  1301   PH 7.40 7.41 7.41 7.45   PCO2 57* 54* 52* 54*   PO2 84 94 112* 102   HCO3 35* 34* 33* 38*   O2PER 50 60 65 60       Current Vent Settings: Ventilation Mode: CMV/AC  (Continuous Mandatory Ventilation/ Assist Control)  FiO2 (%): 80 %  Rate Set (breaths/minute): 18 breaths/min  Tidal Volume Set (mL): 460 mL  PEEP (cm H2O): 14 cmH2O  Oxygen Concentration (%): 80 %  Resp: 21      Skin Assessment: Intact     Plan: Continue full vent support and wean as tolerated     Flori Coffey, RT

## 2021-12-10 NOTE — PROGRESS NOTES
ICU Progress Note    ICU Day: 5  Vent Day 5  Hosp Day: 12      HOSPITAL COURSE  75 yo female with PMH of DEX, DM aortic stenosis , hypothyroidism admit 11/28 with fever, dyspnea diagnosed with COVID PNA 11/28 (home test) though 2 week of symptoms prior. Unvaccinated and did not receive monoclonal Ig. In ED sats mid 80 on RA corrected with 3L however escalating and confused.  Initially admitted to floor on BiPAP however failed and intubtated and transferred to ICU 12/1  Requiring paralysis and proning    12/1 - transfer to ICU Bipap 100% 16/10   12/2 - Intubated, lines (CVC, Art line, Ivey, OG), paralyzed and proned   P/F ratio = 78.   12/3 - Sedation and paralytic weaned d/t bradycardia. Supined at [id number]; re-proned at 2000.  12/4 - prone, supine, sugars elevated, on/off low dose levophed   12/6- pronated for ~18 hr/day all weekend, on low dose levophed. Paralytic weaned off yesterday am  12/7- Very high FSBGs despite changes to sliding scale insulin and lantus yesterday, supinated 6 hours yesterday and pronated again.   12/8- supinated for 24 hours, PF ratio >150  12/9- veletri cut in half    ASSESSMENT/PLAN:  76F DEX, DM2, aortic stenosis, hypothyroidism admitted 11/28 for acute hypoxemic respiratory failure 2/2 COVID-19. Transferred to ICU 12/1. Intubated 12/2.   Proned and paralyzed. Decadron and Toci    I have personally reviewed the daily labs, imaging studies, cultures and discussed the case with referring physician and consulting physicians.     Neuro  # Sedation for mechanical ventilation  # encephalopathy, metabolic  - continue fentanyl/versed for sedation,  - titrate sedation for a RASS of -3 to -4    Pulmonary  # ARDS secondary to covid 19 pneumonia  # DEX  # Acute hypoxemic respiratory failure - oxygenation, mechanics acceptable    Ventilation Mode: CMV/AC  (Continuous Mandatory Ventilation/ Assist Control)  FiO2 (%): 50 %  Rate Set (breaths/minute): 18 breaths/min  Tidal Volume Set (mL): 460  mL  PEEP (cm H2O): 14 cmH2O  Oxygen Concentration (%): 50 %  Resp: 17    - low tidal volume, lung protection ventilation  - stop inhaled epoprostenol   - albuterol nebs  - wean FiO2 as toleratd    Cardiac  # Shock - 2/2 sepsis, sedation, obstruction 2/2 positive pressure ventilation, good markers of perfusion, CXR enlarged silhouette -   # bradycardia  # Aortic stenosis   - wean/titrate levophed for goal MAP > 64, follow markers of perfusion, if jeannine persists will consider changing to epinephrine  - aim even fluid status to net negative, continue to diurese this AM    Renal  Fluid overload, net positive 4.0 L since admission  # cr, UOP appropriate   - strict I&O,   - lasix 40 mg IV x1  - electrolyte replacement protocols   - decrease free water 60 ml q4     GI  # at risk for malnutrition  - continue tube feeds  - bowel regimen, PPI     Heme/Onc  # Coagulopathy 2/2 COVID-19  - monitor CBC  - intermediate dose lovenox    ID  # COVID-19 pneumonia, completed remdesivir , 1 dose toci  - dexamethasone 12mg, finished  - follow clinically     Endo  # DM2 w/ hyperglycemia secondary to steroids, improving now that steroids are finished  # Hypothyroidism  - monitor BG  - increase sliding scale insulin to high  - continue PTA levothyroxine supplementation    PPX  1. DVT: enoxaparin   2. VAP: HOB 30 degrees, chlorhexidine rinse  3. Stress Ulcer: PPI  4. Restraints: Nonviolent soft two point restraints required and necessary for patient safety and continued cares and good effect as patient continues to pull at necessary lines, tubes despite education and distraction. Will readdress daily.   5. Wound care - per unit routine   6. Feeding - TF  7. Family:  Will call after rounds    Pb Jose MD   SICU Fellow      Interval Hx:  Patient intermittently on and off levophed    /54 (BP Location: Left arm)   Pulse 66   Temp 97.5  F (36.4  C)   Resp 17   Wt 84.8 kg (186 lb 15.2 oz)   SpO2 92%   BMI 29.28 kg/m       Gen:  intubated, sedated, prone, appears older than stated age, NAD  Neuro: sedated, PERRL 3--> 2mm, does not respond to painful stimuli  HEENT: anicteric, ETT in place, conjunctiva pink  Card: RRR, no m/r/g, sinus jeannine on tele  Pulm: anteriorly clear , muffled breath sounds on auscultation synchronous with vent   Abd: soft, nontender, no palpable masses  MSK: trace edema, warm   Skin: no obvious rash     Ventilation Mode: CMV/AC  (Continuous Mandatory Ventilation/ Assist Control)  FiO2 (%): 50 %  Rate Set (breaths/minute): 18 breaths/min  Tidal Volume Set (mL): 460 mL  PEEP (cm H2O): 14 cmH2O  Oxygen Concentration (%): 50 %  Resp: 17        Intake/Output Summary (Last 24 hours) at 12/5/2021 0913  Last data filed at 12/5/2021 0600  Gross per 24 hour   Intake 2353.95 ml   Output 1165 ml   Net 1188.95 ml     ROUTINE ICU LABS (Last four results)  CMP  Recent Labs   Lab 12/10/21  0843 12/10/21  0400 12/10/21  0021 12/09/21  0609 12/09/21  0416 12/08/21  0548 12/08/21  0433 12/07/21  0852 12/07/21  0420 12/06/21  0904 12/06/21  0419 12/05/21  0419 12/05/21  0417 12/04/21  0409 12/04/21  0406   NA  --  143  --   --  144  --  145*  --  148*  --  147*  --  148*  --  145*   POTASSIUM  --  3.9  --   --  4.1  --  4.6  --  4.7  --  4.8  --  4.9  --  4.3   CHLORIDE  --  106  --   --  109  --  110*  --  110*  --  112*  --  116*  --  113*   CO2  --  34*  --   --  35*  --  33*  --  35*  --  33*  --  32  --  29   ANIONGAP  --  3  --   --  <1*  --  2*  --  3  --  2*  --  <1*  --  3   * 274*  251* 274*   < > 75   < > 170*   < > 266*  238*   < > 289*   < > 297*   < > 279*   BUN  --  53*  --   --  54*  --  49*  --  46*  --  39*  --  36*  --  29   CR  --  0.62  --   --  0.69  --  0.65  --  0.59  --  0.62  --  0.57  --  0.68   GFRESTIMATED  --  88  --   --  85  --  87  --  90  --  88  --  >90  --  86   LEENA  --  7.6*  --   --  8.1*  --  8.4*  --  8.4*  --  8.3*  --  8.4*  --  8.2*   MAG  --   --   --   --  2.9*  --   --   --   --   --   2.5*  --   --   --  2.8*   PHOS  --   --   --   --  3.8  --  3.9  --  2.9  --  2.9  --  2.6  --  3.0   PROTTOTAL  --   --   --   --   --   --   --   --   --   --  5.7*  --  5.9*  --  6.1*   ALBUMIN  --   --   --   --   --   --   --   --   --   --  1.9*  --  1.8*  --  1.8*   BILITOTAL  --   --   --   --   --   --   --   --   --   --  0.4  --  0.2  --  0.1*   ALKPHOS  --   --   --   --   --   --   --   --   --   --  40  --  45  --  49   AST  --   --   --   --   --   --   --   --   --   --  30  --  21  --  23   ALT  --   --   --   --   --   --   --   --   --   --  38  --  25  --  25    < > = values in this interval not displayed.     CBC  Recent Labs   Lab 12/10/21  0400 12/09/21  0416 12/08/21  0434 12/07/21  0420   WBC 10.7 11.4* 11.0 8.8   RBC 4.07 4.13 4.26 4.13   HGB 11.7 12.0 12.4 11.7   HCT 38.4 38.7 39.8 38.4   MCV 94 94 93 93   MCH 28.7 29.1 29.1 28.3   MCHC 30.5* 31.0* 31.2* 30.5*   RDW 14.1 14.1 13.9 14.1    281 330 318     INR  No lab results found in last 7 days.  Arterial Blood Gas  Recent Labs   Lab 12/10/21  0401 12/09/21  0417 12/08/21  0435 12/07/21  1301   PH 7.40 7.41 7.41 7.45   PCO2 57* 54* 52* 54*   PO2 84 94 112* 102   HCO3 35* 34* 33* 38*   O2PER 50 60 65 60     No results found for this or any previous visit (from the past 24 hour(s)).    Labs: reviewed  Imaging: reviewed    Pb Jose MD   SICU Fellow

## 2021-12-10 NOTE — PROGRESS NOTES
Neuro: RASS -3 to -4, some waking with cares. Does not follow  CV: SR, BP stable, Levophed titrated to meet MAP goal 65>  Resp: No changes to vent settings overnight. Saturations in the low to mid 90s  GI/: Small BM, Tube feed to goal. Patient had two moderate size loose stools overnight. Ivey patent draining adequate urine.  Skin: No serious skin issues noted

## 2021-12-10 NOTE — PROGRESS NOTES
[facility] ICU RESPIRATORY NOTE           Date of Admission: 11/28/2021     Date of Intubation (most recent): 12/2/21     Reason for Mechanical Ventilation: Respiratory Failure     Number of Days on Mechanical Ventilation: 9     Met Criteria for Spontaneous Breathing Trial: no     Reason for No Spontaneous Breathing Trial: PEEP>8     Significant Events Today: none     ABG Results:   Recent Labs   Lab 12/10/21  0401 12/09/21  0417 12/08/21  0435 12/07/21  1301   PH 7.40 7.41 7.41 7.45   PCO2 57* 54* 52* 54*   PO2 84 94 112* 102   HCO3 35* 34* 33* 38*   O2PER 50 60 65 60     Ventilation Mode: CMV/AC  (Continuous Mandatory Ventilation/ Assist Control)  FiO2 (%): 50 %  Rate Set (breaths/minute): 18 breaths/min  Tidal Volume Set (mL): 460 mL  PEEP (cm H2O): 14 cmH2O  Oxygen Concentration (%): 50 %  Resp: 17    FIDEL Montano, RRT

## 2021-12-11 NOTE — PROGRESS NOTES
ICU Progress Note    ICU Day: 5  Vent Day 5  Hosp Day: 13      HOSPITAL COURSE  77 yo female with PMH of DEX, DM aortic stenosis , hypothyroidism admit 11/28 with fever, dyspnea diagnosed with COVID PNA 11/28 (home test) though 2 week of symptoms prior. Unvaccinated and did not receive monoclonal Ig. In ED sats mid 80 on RA corrected with 3L however escalating and confused.  Initially admitted to floor on BiPAP however failed and intubated and transferred to ICU 12/1. Initially erquired paralysis and proning.     12/1 - transfer to ICU Bipap 100% 16/10   12/2 - Intubated, lines (CVC, Art line, Ivey, OG), paralyzed and proned   P/F ratio = 78.   12/3 - Sedation and paralytic weaned d/t bradycardia. Supined at [id number]; re-proned at 2000.  12/4 - prone, supine, sugars elevated, on/off low dose levophed   12/6- pronated for ~18 hr/day all weekend, on low dose levophed. Paralytic weaned off yesterday am  12/7- Very high FSBGs despite changes to sliding scale insulin and lantus yesterday, supinated 6 hours yesterday and pronated again.   12/8- supinated for 24 hours, PF ratio >150  12/9- veletri cut in half  12/10 - veletri off   12/11 - prone for P/F < 150     ASSESSMENT/PLAN:  76F DEX, DM2, aortic stenosis, hypothyroidism admitted 11/28 for acute hypoxemic respiratory failure 2/2 COVID-19. Transferred to ICU 12/1 and intubated 12/2. She initially required proning and paralysis. She is s/p decadron and toci.     I have personally reviewed the daily labs, imaging studies, cultures and discussed the case with referring physician and consulting physicians.     Changes made today:   - diurese with 40 mg IV lasix BID, check lytes this afternoon  - lantus increased to 12 U daily   - prone for P/F < 150    Neuro  # Sedation for mechanical ventilation  # encephalopathy, metabolic  - continue fentanyl/versed for sedation,  - titrate sedation for a RASS of -3 to -4    Pulmonary  # ARDS secondary to covid 19 pneumonia  # DEX  #  Acute hypoxemic respiratory failure - oxygenation, mechanics acceptable    Ventilation Mode: CMV/AC  (Continuous Mandatory Ventilation/ Assist Control)  FiO2 (%): 70 %  Rate Set (breaths/minute): 18 breaths/min  Tidal Volume Set (mL): 460 mL  PEEP (cm H2O): 14 cmH2O  Oxygen Concentration (%): 70 %  Resp: 14  - low tidal volume, lung protection ventilation; prone for P/F < 150   - inhaled epoprostenol stopped 12/10  - albuterol nebs  - wean FiO2 as tolerated    Cardiac  # Shock - 2/2 sepsis, sedation, obstruction 2/2 positive pressure ventilation, good markers of perfusion, CXR enlarged silhouette   # bradycardia, resolved  # Aortic stenosis   - wean/titrate levophed for goal MAP > 64, follow markers of perfusion, if jeannine returned would consider changing to epinephrine  - aim even fluid status to net negative, continue to diurese this AM    Renal  Fluid overload, net positive 4.5 L since admission  # Cr, UOP appropriate   #Hypernatremia   - strict I&O, avoid nephrotoxins   - lasix 40 mg IV x2 and check electrolytes in the afternoon   - electrolyte replacement protocols   - increase free water to 75 ml q4, Na check at 2 pm     GI  # at risk for malnutrition  - continue tube feeds  - bowel regimen (docusate/miralax), PPI     Heme/Onc  # Coagulopathy 2/2 COVID-19  - monitor CBC  - intermediate dose lovenox per COVID protocol     ID  # COVID-19 pneumonia, completed remdesivir , 1 dose toci  - dexamethasone 12mg, finished  - follow clinically and monitor for signs of new infection     Endo  # DM2 w/ hyperglycemia secondary to steroids, improving now that steroids are finished  # Hypothyroidism  - monitor BG  - increase sliding scale insulin to high  - slight increase in lantus to 12 U today   - continue PTA levothyroxine supplementation    PPX  1. DVT: enoxaparin   2. VAP: HOB 30 degrees, chlorhexidine rinse  3. Stress Ulcer: PPI  4. Restraints: Nonviolent soft two point restraints required and necessary for patient  safety and continued cares and good effect as patient continues to pull at necessary lines, tubes despite education and distraction. Will readdress daily.   5. Wound care - per unit routine   6. Feeding - TF  7. Family:  Will call after rounds      Critical care time: 35 mins (excluding procedures)    Tiffanie Marroquin MD  Pulmonary, Allergy, Critical Care, and Sleep Medicine   [facility]   Pager: [phone]        Interval Hx:  NAOE. She remains on very low dose NE - 0.2 mcg/kg/min.     /71   Pulse 66   Temp 97.5  F (36.4  C)   Resp 14   Wt 83.9 kg (184 lb 15.5 oz)   SpO2 92%   BMI 28.97 kg/m       Gen: intubated, sedated, prone, appears older than stated age, NAD  Neuro: sedated, PERRL,  does not respond to painful stimuli  HEENT: anicteric, ETT in place, conjunctiva pink  Card: RRR, no m/r/g  Pulm: anteriorly clear, good air movement b/l, no rhonchi, wheezes   Abd: soft, nontender, no palpable masses  MSK: + edema, warm   Skin: no obvious rash     Ventilation Mode: CMV/AC  (Continuous Mandatory Ventilation/ Assist Control)  FiO2 (%): 70 %  Rate Set (breaths/minute): 18 breaths/min  Tidal Volume Set (mL): 460 mL  PEEP (cm H2O): 14 cmH2O  Oxygen Concentration (%): 70 %  Resp: 14        Intake/Output Summary (Last 24 hours) at 12/11/2021 1205  Last data filed at 12/11/2021 1000  Gross per 24 hour   Intake 2154 ml   Output 1435 ml   Net 719 ml       ROUTINE ICU LABS (Last four results)  CMP  Recent Labs   Lab 12/11/21  0755 12/11/21  0429 12/11/21  0424 12/11/21  0019 12/10/21  0843 12/10/21  0400 12/09/21  0609 12/09/21  0416 12/08/21  0548 12/08/21  0433 12/07/21  0852 12/07/21  0420 12/06/21  0904 12/06/21  0419 12/05/21  0419 12/05/21  0417   NA  --   --  145*  --   --  143  --  144  --  145*  --  148*  --  147*  --  148*   POTASSIUM  --   --  4.5  --   --  3.9  --  4.1  --  4.6  --  4.7  --  4.8  --  4.9   CHLORIDE  --   --  107  --   --  106  --  109  --  110*  --  110*  --  112*  --  116*    CO2  --   --  36*  --   --  34*  --  35*  --  33*  --  35*  --  33*  --  32   ANIONGAP  --   --  2*  --   --  3  --  <1*  --  2*  --  3  --  2*  --  <1*   * 207* 221* 244*   < > 274*  251*   < > 75   < > 170*   < > 266*  238*   < > 289*   < > 297*   BUN  --   --  42*  --   --  53*  --  54*  --  49*  --  46*  --  39*  --  36*   CR  --   --  0.59  --   --  0.62  --  0.69  --  0.65  --  0.59  --  0.62  --  0.57   GFRESTIMATED  --   --  90  --   --  88  --  85  --  87  --  90  --  88  --  >90   LEENA  --   --  8.0*  --   --  7.6*  --  8.1*  --  8.4*  --  8.4*  --  8.3*  --  8.4*   MAG  --   --  2.9*  --   --   --   --  2.9*  --   --   --   --   --  2.5*  --   --    PHOS  --   --   --   --   --   --   --  3.8  --  3.9  --  2.9  --  2.9  --  2.6   PROTTOTAL  --   --   --   --   --   --   --   --   --   --   --   --   --  5.7*  --  5.9*   ALBUMIN  --   --   --   --   --   --   --   --   --   --   --   --   --  1.9*  --  1.8*   BILITOTAL  --   --   --   --   --   --   --   --   --   --   --   --   --  0.4  --  0.2   ALKPHOS  --   --   --   --   --   --   --   --   --   --   --   --   --  40  --  45   AST  --   --   --   --   --   --   --   --   --   --   --   --   --  30  --  21   ALT  --   --   --   --   --   --   --   --   --   --   --   --   --  38  --  25    < > = values in this interval not displayed.     CBC  Recent Labs   Lab 12/11/21  0424 12/10/21  0400 12/09/21  0416 12/08/21  0434   WBC 11.8* 10.7 11.4* 11.0   RBC 4.12 4.07 4.13 4.26   HGB 11.7 11.7 12.0 12.4   HCT 39.0 38.4 38.7 39.8   MCV 95 94 94 93   MCH 28.4 28.7 29.1 29.1   MCHC 30.0* 30.5* 31.0* 31.2*   RDW 14.2 14.1 14.1 13.9    252 281 330     INR  No lab results found in last 7 days.  Arterial Blood Gas  Recent Labs   Lab 12/11/21  0425 12/10/21  0401 12/09/21  0417 12/08/21  0435   PH 7.43 7.40 7.41 7.41   PCO2 56* 57* 54* 52*   PO2 91 84 94 112*   HCO3 37* 35* 34* 33*   O2PER 70 50 60 65     No results found for this or any previous  visit (from the past 24 hour(s)).    Labs: reviewed  Imaging: reviewed    Tiffanie Marroquin MD  Pulmonary, Allergy, Critical Care, and Sleep Medicine   [facility]   Pager: [phone]

## 2021-12-11 NOTE — PROGRESS NOTES
[facility] ICU RESPIRATORY NOTE        Date of Admission: 11/28/2021    Date of Intubation (most recent): 12/2/2021.    Reason for Mechanical Ventilation: Respiratory Failure.    Number of Days on Mechanical Ventilation: 10 days.    Met Criteria for Spontaneous Breathing Trial: No.    Reason for No Spontaneous Breathing Trial: PEEP>8 cmH2O, FiO2 > 50%.     Significant Events Today: Patient proned at 1415 after P/F ratio 130.     ABG Results:   Recent Labs   Lab 12/11/21  1553 12/11/21  0425 12/10/21  0401 12/09/21  0417   PH 7.41 7.43 7.40 7.41   PCO2 61* 56* 57* 54*   PO2 135* 91 84 94   HCO3 39* 37* 35* 34*   O2PER 70 70 50 60       Current Vent Settings: Ventilation Mode: CMV/AC  (Continuous Mandatory Ventilation/ Assist Control)  FiO2 (%): 70 %  Rate Set (breaths/minute): 18 breaths/min  Tidal Volume Set (mL): 460 mL  PEEP (cm H2O): 14 cmH2O  Oxygen Concentration (%): 70 %  Resp: 15      Skin Assessment: Clean/Dry/Intact.    Plan: Continue to monitor patient and assess for weaning from mechanical ventilation. Continue Q2 head turns while proned. Continue Q4 delivery of respiratory medications. RT to continue to follow.    Alexei Lucas, RT

## 2021-12-11 NOTE — PROGRESS NOTES
Brief:     DaughterSandra, updated via telephone.     Tiffanie Marroquin MD  Pulmonary, Allergy, Critical Care, and Sleep Medicine   [facility]   Pager: [phone]

## 2021-12-11 NOTE — PROGRESS NOTES
[facility] ICU RESPIRATORY NOTE           Date of Admission: 11/28/2021     Date of Intubation (most recent): 12/2/21     Reason for Mechanical Ventilation: respiratory failure      Number of Days on Mechanical Ventilation: 10     Met Criteria for Spontaneous Breathing Trial: No     Reason for No Spontaneous Breathing Trial: Peep of 14      Significant Events Today: none   Recent Labs   Lab 12/11/21  0425 12/10/21  0401 12/09/21  0417 12/08/21  0435   PH 7.43 7.40 7.41 7.41   PCO2 56* 57* 54* 52*   PO2 91 84 94 112*   HCO3 37* 35* 34* 33*   O2PER 70 50 60 65   Ventilation Mode: CMV/AC  (Continuous Mandatory Ventilation/ Assist Control)  FiO2 (%): 80 %  Rate Set (breaths/minute): 18 breaths/min  Tidal Volume Set (mL): 460 mL  PEEP (cm H2O): 14 cmH2O  Oxygen Concentration (%): 70 %  Resp: 20    Plan: To monitor pt on full vent support.

## 2021-12-11 NOTE — PROGRESS NOTES
Patient off flolan. Continues with versed, levo, and fentanyl gtts. Tolerating tube feeding. Few small BMs today. Good urine output. Thick secretions via ETT. Daughter updated over the phone by MD and RN.

## 2021-12-11 NOTE — PLAN OF CARE
Neuro: Opens eyes to pain, but does not follow. W/D from pain.  CV: Tele SR. On 0.02 of levo.   Resp: Attempting to wean down on Fi02, currently on 70%. PF ratio this morning was 115, Dr. Fatima notified, who will have the rounding morning MD decide on action. Breaths above set vent rate.   GI: No BM.   : Good urine output per zee.   Skin: No changes.   Lines: ART line functional.   Access: Triple lumen internal jugular .   Gtts: Levo at 0.02, Fentanyl at 75, versed at 2  Other:

## 2021-12-12 NOTE — PLAN OF CARE
Neuro: RASS -4. Pupils equal and reactive.   CV: Tele SR. Map above 65 with levo at 0.02.   Resp: Rate changed to 20, MD aware. Fi02 down to 50%. Overbreathing ventilator, versed turned up to 3 with improvement.   GI: TF residual 325. No BM overnight.   : Good urine output per zee catheter.   Skin: No changes.   Lines: Triple lumen internal jugular line.   Access: Right radial art line.   Gtts: Versed at 3, Fentanyl at 100, Levo at 0.02.   Other: Prone since 2 pm yesterday. Doing head turns every 2 hours with RT.

## 2021-12-12 NOTE — PROGRESS NOTES
Critical Care Progress Note      12/12/2021    Name: Mavis Whiteside MRN#: [id number]   Age: 76 year old YOB: 1945     Hsptl Day# 14  ICU DAY #    MV DAY #             Problem List:   Active Problems:    Encephalopathy    Acute respiratory failure with hypoxia (H)    Pneumonia of right lower lobe due to infectious organism    Pneumonia due to 2019 novel coronavirus           Summary/Hospital Course:     75 yo female with PMH of DEX, DM aortic stenosis , hypothyroidism admit 11/28 with fever, dyspnea diagnosed with COVID PNA 11/28 (home test) though 2 week of symptoms prior. Unvaccinated and did not receive monoclonal Ig. In ED sats mid 80 on RA corrected with 3L however escalating and confused.     Initially required paralysis and proning on 12/11 for P/F <150        Assessment and plan :     Mavis Whiteside IS a 76 year old female admitted on 11/28/2021 for acute hypoxermic respiratory failure secondary to Covid-19. She is s/p Toci and decadron  I have personally reviewed the daily labs, imaging studies, cultures and discussed the case with referring physician and consulting physicians.     My assessment and plan by system for this patient is as follows:    Neurology/Psychiatry:   1. Sedated and intubated: RASS -3 to -4  2. Analgesia:fentanyl;  3. Anxiety: on midazolam   Plan  Continue current sedation protocol       Cardiovascular:   Septic shock   1.Hemodynamics -On Norepinephrine  2.Rhythm - bradycardia resolved  3. Ischemia - none  4. Aortic stenosis moderate, AoV pressure gradient is  24.1 mmHg   Echo 12/5  The left ventricle is normal in size.  The visual ejection fraction is 60-65%.  Grade I or early diastolic dysfunction.  No regional wall motion abnormalities noted.  The right ventricle is normal in size and function.  There is sclerosis, calcification, and restriction of the aortic valve opening  compatible with moderate aortic stenosis    Plan   MAP goal at least 65 mm Hg  Titrate and wean  pressors as tolerated    Pulmonary/Ventilator Management:   ARDS secondary to Covid 19 pneumonia  Acute hypoxemic respiratory failure  1. Airway intubation  2. Oxygenation/ventilation/mechanics  CMV/AC  (Continuous Mandatory Ventilation/ Assist Control)  FiO2 (%): 70 %  Rate Set (breaths/minute): 18 breaths/min  Tidal Volume Set (mL): 460 mL  PEEP (cm H2O): 14 cmH2O  Oxygen Concentration (%): 70 %  Resp: 15  On albuterol   Results for EUGENIE BYERS (MRN [id number]) as of 12/12/2021 09:18   Ref. Range 12/12/2021 05:07   pH Arterial Latest Ref Range: 7.35 - 7.45  7.45   pCO2 Arterial Latest Ref Range: 35 - 45 mm Hg 60 (H)   PO2 Arterial Latest Ref Range: 80 - 105 mm Hg 91   Bicarbonate Arterial Latest Ref Range: 21 - 28 mmol/L 41 (H)   Base Excess Art Latest Ref Range: -9.0 - 1.8 mmol/L 14.3 (H)   FIO2 Unknown 55     P/F 165  On low tidal volume ventilatoion   Inhaled epoprostenol   Albuterol    Plan  -  Wean FiO as tolerated                            GI and Nutrition :   1. Drip feeding: Osmolite 1.5 with goal rate of 60ml/hour  2. Enteral feedings  Plan  -Continue feeds  - on pantoprazole    Renal/Fluids/Electrolytes:   1. Electrolyte abnormality: Hypernatremia,   2 Acid/base status: compensated  4. Volume status: fluid overload on diuresis  Plan  - Continue diuresis with furosemide 40 mg every 8 hours  - monitor function and electrolytes as needed with replacement per ICU protocols.  - generally avoid nephrotoxic agents such as NSAID, IV contrast unless specifically required  - adjust medications as needed for renal clearance  - follow I/O's as appropriate.  - increase free water to 100ml q4 hours     Infectious Disease:   1. Not on antibiotics  2. Got one dose of toci and dexamethasone completed   Plan  - Continue to monitor    Endocrine:   DM type II today glucose is 318  1. On Lantus 12 units at bedtime  2. Stress induced hyperglycemia  3. levothyroxin  Plan  - ICU insulin protocol, goal sugar <180  - off  steroids   - increase lantus to 15 units    Hematology/Oncology:   1. WBC 15.6  2. Anemia, no signs, symptoms of active blood loss  3.   Plan  - Continue to monitor    IV/Access:   1. Venous access - central line  2. Arterial access - Yes    Plan  - central access required and necessary      ICU Prophylaxis:   1. DVT:  LMWH/mechanical  2. VAP: HOB 30 degrees, chlorhexidine rinse  3. Stress Ulcer: PPI/  4. Restraints: Nonviolent soft two point restraints required and necessary for patient safety and continued cares and good effect as patient continues to pull at necessary lines, tubes despite education and distraction. Will readdress daily.   5. Wound care - per unit routine   6. Feeding - feeds  7. Family Update:Yes  8. Disposition ICU      Key goals for next 24 hours:   1. Continue diuresis  2. Monitor sodium (on free water)  3. Wean pressors as tolerated          Key Medications:       acetylcysteine  2 mL Nebulization Q4H     chlorhexidine  15 mL Mouth/Throat Q12H     docusate  100 mg Oral or Feeding Tube BID     enoxaparin ANTICOAGULANT  0.5 mg/kg Subcutaneous BID     insulin aspart  1-12 Units Subcutaneous Q4H     insulin glargine  12 Units Subcutaneous At Bedtime     levothyroxine  112 mcg Oral or Feeding Tube Daily     pantoprazole  40 mg Per Feeding Tube QAM AC    Or     pantoprazole (PROTONIX) IV  40 mg Intravenous QAM AC     polyethylene glycol  17 g Oral Daily     sodium chloride (PF)  3 mL Intracatheter Q8H       dextrose       fentaNYL 100 mcg/hr (12/12/21 0800)     - MEDICATION INSTRUCTIONS -       midazolam 4 mg/hr (12/12/21 0800)     - MEDICATION INSTRUCTIONS -       norepinephrine 0.02 mcg/kg/min (12/12/21 0800)     - MEDICATION INSTRUCTIONS -       - MEDICATION INSTRUCTIONS -       sodium chloride 10 mL/hr at 12/12/21 0808               Physical Examination:   Temp:  [97  F (36.1  C)-98.6  F (37  C)] 98.6  F (37  C)  Pulse:  [66-97] 80  Resp:  [11-28] 20  BP: (117-122)/(65-81) 122/81  MAP:   [55 mmHg-108 mmHg] 67 mmHg  Arterial Line BP: ()/(37-75) 107/45  FiO2 (%):  [50 %-70 %] 50 %  SpO2:  [91 %-99 %] 94 %    Intake/Output Summary (Last 24 hours) at 12/12/2021 0859  Last data filed at 12/12/2021 0600  Gross per 24 hour   Intake 1965.22 ml   Output 1860 ml   Net 105.22 ml     Wt Readings from Last 4 Encounters:   12/12/21 84.5 kg (186 lb 4.6 oz)   03/22/21 96.2 kg (212 lb)     Arterial Line BP: ()/(37-75) 107/45  MAP:  [55 mmHg-108 mmHg] 67 mmHg  BP - Mean:  [84-94] 94  Ventilation Mode: CMV/AC  (Continuous Mandatory Ventilation/ Assist Control)  FiO2 (%): 50 %  Rate Set (breaths/minute): 20 breaths/min  Tidal Volume Set (mL): 460 mL  PEEP (cm H2O): 14 cmH2O  Oxygen Concentration (%): 50 %  Resp: 20    Recent Labs   Lab 12/12/21  0507 12/11/21  1553 12/11/21  0425 12/10/21  0401   PH 7.45 7.41 7.43 7.40   PCO2 60* 61* 56* 57*   PO2 91 135* 91 84   HCO3 41* 39* 37* 35*   O2PER 55 70 70 50       GEN: no acute distress   HEENT: head ncat, sclera anicteric, OP patent, trachea midline   PULM: unlabored synchronous with vent, clear anteriorly    CV/COR: RRR S1S2 no gallop,  No rub, no murmur  ABD: soft nontender, hypoactive bowel sounds, no mass  EXT:  Edema   warm  NEURO: grossly intact  SKIN: no obvious rash  LINES: clean, dry intact         Data:   All data and imaging reviewed     ROUTINE ICU LABS (Last four results)  CMP  Recent Labs   Lab 12/12/21  0751 12/12/21  0506 12/12/21  0415 12/12/21  0011 12/11/21  1552 12/11/21  1429 12/11/21  0429 12/11/21  0424 12/10/21  0843 12/10/21  0400 12/09/21  0609 12/09/21  0416 12/08/21  0548 12/08/21  0433 12/07/21  0852 12/07/21  0420 12/06/21  0904 12/06/21  0419   NA  --  145*  --   --   --  148*  --  145*  --  143  --  144  --  145*  --  148*  --  147*   POTASSIUM  --  3.9  --   --   --  4.4  --  4.5  --  3.9  --  4.1  --  4.6  --  4.7  --  4.8   CHLORIDE  --  106  --   --   --   --   --  107  --  106  --  109  --  110*  --  110*  --  112*   CO2  --   39*  --   --   --   --   --  36*  --  34*  --  35*  --  33*  --  35*  --  33*   ANIONGAP  --  <1*  --   --   --   --   --  2*  --  3  --  <1*  --  2*  --  3  --  2*   * 279* 269* 280*   < >  --    < > 221*   < > 274*  251*   < > 75   < > 170*   < > 266*  238*   < > 289*   BUN  --  39*  --   --   --   --   --  42*  --  53*  --  54*  --  49*  --  46*  --  39*   CR  --  0.56  --   --   --   --   --  0.59  --  0.62  --  0.69  --  0.65  --  0.59  --  0.62   GFRESTIMATED  --  >90  --   --   --   --   --  90  --  88  --  85  --  87  --  90  --  88   LEENA  --  8.2*  --   --   --   --   --  8.0*  --  7.6*  --  8.1*  --  8.4*  --  8.4*  --  8.3*   MAG  --   --   --   --   --  2.8*  --  2.9*  --   --   --  2.9*  --   --   --   --   --  2.5*   PHOS  --   --   --   --   --   --   --   --   --   --   --  3.8  --  3.9  --  2.9  --  2.9   PROTTOTAL  --   --   --   --   --   --   --   --   --   --   --   --   --   --   --   --   --  5.7*   ALBUMIN  --   --   --   --   --   --   --   --   --   --   --   --   --   --   --   --   --  1.9*   BILITOTAL  --   --   --   --   --   --   --   --   --   --   --   --   --   --   --   --   --  0.4   ALKPHOS  --   --   --   --   --   --   --   --   --   --   --   --   --   --   --   --   --  40   AST  --   --   --   --   --   --   --   --   --   --   --   --   --   --   --   --   --  30   ALT  --   --   --   --   --   --   --   --   --   --   --   --   --   --   --   --   --  38    < > = values in this interval not displayed.     CBC  Recent Labs   Lab 12/12/21  0506 12/11/21  0424 12/10/21  0400 12/09/21  0416   WBC 15.6* 11.8* 10.7 11.4*   RBC 4.08 4.12 4.07 4.13   HGB 11.8 11.7 11.7 12.0   HCT 38.9 39.0 38.4 38.7   MCV 95 95 94 94   MCH 28.9 28.4 28.7 29.1   MCHC 30.3* 30.0* 30.5* 31.0*   RDW 14.6 14.2 14.1 14.1    219 252 281     INRNo lab results found in last 7 days.  Arterial Blood Gas  Recent Labs   Lab 12/12/21  0507 12/11/21  1553 12/11/21  0425 12/10/21  0401   PH 7.45  7.41 7.43 7.40   PCO2 60* 61* 56* 57*   PO2 91 135* 91 84   HCO3 41* 39* 37* 35*   O2PER 55 70 70 50       All cultures:  No results for input(s): CULT in the last 168 hours.  No results found for this or any previous visit (from the past 24 hour(s)).      Billing: This patient is critically ill: Yes. Total critical care time today 34 min.

## 2021-12-12 NOTE — PROGRESS NOTES
Patient proned aprox 1420. Soft loose stools today. Diuresing with lasix. Versed, fentanyl, and norepi drips. Daughter updated by MD. Thick secretions via ett. Lungs dim, coarse.

## 2021-12-13 NOTE — PLAN OF CARE
Neuro: Rass -4. Pupils equal and reactive.   CV: Tele SR. Levo at 0.02 to keep maps above 65.   Resp: Full vent support. 50% Fi02. PF ratio 164 this morning.   GI: 2 small loose BMs. Tolerating TF.   : Good urine output per zee catheter.   Skin: No changes.   Lines: Art line.   Access: Triple lumen internal jugular line.   Gtts: Versed, fentanyl, levophed. Versed up to 5 due to patient opening eyes and more wakeful while proned.   Other: Head turns q2h with RT.

## 2021-12-13 NOTE — PROGRESS NOTES
CLINICAL NUTRITION SERVICES - REASSESSMENT NOTE      RECOMMENDATIONS FOR MD/PROVIDER TO ORDER: Lantus adjustments per MD discretion    Malnutrition: (12/2)  % Weight Loss:  > 2% in 1 week (severe malnutrition)  % Intake:  </= 50% for >/= 5 days (severe malnutrition)  Subcutaneous Fat Loss:  Deferred with COVID-19 precautions   Muscle Loss:  Deferred with COVID-19 precautions   Fluid Retention: Does not meet criteria - trace generalized      Malnutrition Diagnosis: Severe malnutrition  In Context of:  Acute illness or injury        EVALUATION OF PROGRESS TOWARD GOALS   Diet:  NPO on vent     Nutrition Support:  Patient continues on goal TF regimen as follows ~    Nutrition Support Enteral:  Type of Feeding Tube: NG  Enteral Frequency:  Continuous  Enteral Regimen: Osmolite 1.5 at 60 mL/hr x 22 hours per day (holding TF 1 hour before and 1 hour after Levothyroxine administration)  Total Enteral Provisions: 1980 kcal (30 kcal/kg), 83 g protein (1.2 g/kg), 269 g CHO, 0 g fiber, 1006 mL H2O  Free Water Flush: 200 mL every 4 hours     Intake/Tolerance:    Na 146 (H) - Flushes increased today   K and Phos normal  Mg 2.8 (H)  -233 with High sliding scale insulin and Lantus 25 units in HS  BM x 1 today and x 3 yesterday   I/O 2613/2520, wt 82.2 kg (down slightly) - IV Lasix       ASSESSED NUTRITION NEEDS:  DW: 67 kg (adjusted)  Energy Needs: 1256-9103 kcals (25-30 Kcal/Kg) - overweight on vent    Protein Needs:  grams protein (1.2-1.5 g pro/Kg) - hypercatabolism with critical illness       NEW FINDINGS:   12/11:  Prone   12/12:  Supine --> Re-proned   1213:  Plan to supine     Noted per rounds plan for care conference tomorrow at 1300    Patient receiving Levothyroxine down FT -- holding TF 1 hour before and 1 hour after administration     Previous Goals (12/9):   TF will meet % needs  Evaluation: Met    Previous Nutrition Diagnosis (12/9):   Inadequate energy intake related to TF at 60 mL/hr, estimated  needs adjusted due to weight drop as evidenced by meeting 85% re-estimated energy needs   Evaluation: Resolved       CURRENT NUTRITION DIAGNOSIS  Altered nutrition-related lab value (high glucoses) related to stress, DM as evidenced by BGM > 180     INTERVENTIONS  Recommendations / Nutrition Prescription  Continue Osmolite 1.5 at 60 mL/hr x 22 hours per day as above   Lantus adjustments per MD discretion     Implementation  Collaboration and Referral of Nutrition care:  Patient discussed today during interdisciplinary bedside rounds     Goals  BGM < 180    MONITORING AND EVALUATION:  Progress towards goals will be monitored and evaluated per protocol and Practice Guidelines    Loretta Ritchie RD, LD, CNSC   Clinical Dietitian - [facility]

## 2021-12-13 NOTE — PROGRESS NOTES
Critical access hospital ICU RESPIRATORY NOTE        Date of Admission: 11/28/2021    Date of Intubation (most recent): 12/2/2021.    Reason for Mechanical Ventilation: Respiratory Failure.    Number of Days on Mechanical Ventilation: 11 days.    Met Criteria for Spontaneous Breathing Trial: No.    Reason for No Spontaneous Breathing Trial: Patient too unstable, PEEP of 14 cm H2O, FiO2 of 70%.    Significant Events Today: Patient placed in supine position at 0900. Patient placed Prone at 1730 after P/F ratio of 132.     ABG Results:   Recent Labs   Lab 12/12/21  1224 12/12/21  0507 12/11/21  1553 12/11/21  0425   PH 7.46* 7.45 7.41 7.43   PCO2 59* 60* 61* 56*   PO2 93 91 135* 91   HCO3 42* 41* 39* 37*   O2PER 70 55 70 70       Current Vent Settings: Ventilation Mode: CMV/AC  (Continuous Mandatory Ventilation/ Assist Control)  FiO2 (%): 70 %  Rate Set (breaths/minute): 20 breaths/min  Tidal Volume Set (mL): 460 mL  PEEP (cm H2O): 14 cmH2O  Oxygen Concentration (%): 70 %  Resp: 19      Skin Assessment: Clean/Dry/Intact.    Plan: Continue to monitor for weaning from mechanical ventilation. Continue Q4 skin assessment checks. Continue Q2 head turns while proned. Continue delivery of respiratory medications Q4. RT to continue to follow.    Alexei Lucas, RT

## 2021-12-13 NOTE — PROGRESS NOTES
[facility] ICU RESPIRATORY NOTE        Date of Admission: 11/28/2021    Date of Intubation (most recent):12/2/21    Reason for Mechanical Ventilation:Respiratory failure    Number of Days on Mechanical Ventilation:12    Met Criteria for Spontaneous Breathing Trial:No    Reason for No Spontaneous Breathing Trial:Pt is on a PEEP of 14    Significant Events Today:Full strength Veletri was re-started per MD order. Pt was supined at 1115.    ABG Results:   Recent Labs   Lab 12/13/21  0418 12/12/21  1224 12/12/21  0507 12/11/21  1553   PH 7.47* 7.46* 7.45 7.41   PCO2 60* 59* 60* 61*   PO2 82 93 91 135*   HCO3 44* 42* 41* 39*   O2PER 50 70 55 70       Current Vent Settings: Ventilation Mode: CMV/AC  (Continuous Mandatory Ventilation/ Assist Control)  FiO2 (%): 60 %  Rate Set (breaths/minute): 20 breaths/min  Tidal Volume Set (mL): 460 mL  PEEP (cm H2O): 14 cmH2O  Oxygen Concentration (%): 50 %  Resp: 20      Skin Assessment:Pt has a ulceration on the left upper lip. Tape was removed from around ETT once pt was supined. ETT damon in place with cavilon and mepilex.    Plan:Will cont full vent support for now and will assess for weaning readiness daily.    Olive Whitten, RT

## 2021-12-13 NOTE — PROGRESS NOTES
Care Management Follow Up    Length of Stay (days): 15    Expected Discharge Date: 12/17/2021     Concerns to be Addressed:       Patient plan of care discussed at interdisciplinary rounds: Yes    Anticipated Discharge Disposition:  tbd     Family called earlier to say they are available tomorrow 12/14 for a care conference  Writer confirmed with sahil Figueroa. He and 2 siblings will be at the [facility] tomorrow for 1300 Care conference   Dr Jose aware.  CV conference room scheduled per Diane @  [phone]

## 2021-12-13 NOTE — PROGRESS NOTES
ICU Progress Note    ICU Day: 5  Vent Day 5  Hosp Day: 15      HOSPITAL COURSE  75 yo female with PMH of DEX, DM aortic stenosis , hypothyroidism admit 11/28 with fever, dyspnea diagnosed with COVID PNA 11/28 (home test) though 2 week of symptoms prior. Unvaccinated and did not receive monoclonal Ig. In ED sats mid 80 on RA corrected with 3L however escalating and confused.  Initially admitted to floor on BiPAP however failed and intubtated and transferred to ICU 12/1  Requiring paralysis and proning    12/1 - transfer to ICU Bipap 100% 16/10   12/2 - Intubated, lines (CVC, Art line, Ivey, OG), paralyzed and proned   P/F ratio = 78.   12/3 - Sedation and paralytic weaned d/t bradycardia. Supined at 2090-0813; re-proned at 2000.  12/4 - prone, supine, sugars elevated, on/off low dose levophed   12/6- pronated for ~18 hr/day all weekend, on low dose levophed. Paralytic weaned off yesterday am  12/7- Very high FSBGs despite changes to sliding scale insulin and lantus yesterday, supinated 6 hours yesterday and pronated again.   12/8- supinated for 24 hours, PF ratio >150  12/10- pronated over the weekend. No appreciable improvement in PF ratio    ASSESSMENT/PLAN:  76F DEX, DM2, aortic stenosis, hypothyroidism admitted 11/28 for acute hypoxemic respiratory failure 2/2 COVID-19. Transferred to ICU 12/1. Intubated 12/2.   Proned and paralyzed. Decadron and Toci    I have personally reviewed the daily labs, imaging studies, cultures and discussed the case with referring physician and consulting physicians.     Neuro  # Sedation for mechanical ventilation  # encephalopathy, metabolic  - continue fentanyl/versed for sedation,  - titrate sedation for a RASS of -3 to -4    Pulmonary  # ARDS secondary to covid 19 pneumonia  # DEX  # Acute hypoxemic respiratory failure - oxygenation, mechanics acceptable    Ventilation Mode: CMV/AC  (Continuous Mandatory Ventilation/ Assist Control)  FiO2 (%): 50 %  Rate Set (breaths/minute): 20  breaths/min  Tidal Volume Set (mL): 460 mL  PEEP (cm H2O): 14 cmH2O  Oxygen Concentration (%): 50 %  Resp: 20    - low tidal volume, lung protection ventilation  - restart epoprostenol   - albuterol nebs  - wean FiO2 as toleratd    Cardiac  # Shock - 2/2 sepsis, sedation, obstruction 2/2 positive pressure ventilation, good markers of perfusion, CXR enlarged silhouette -   # bradycardia, improved  # Aortic stenosis   - wean/titrate levophed for goal MAP > 64, follow markers of perfusion, if jeannine persists will consider changing to epinephrine  - aim even fluid status to net negative, continue to diurese this AM    Renal  Fluid overload, net positive 4.0 L since admission  # cr, UOP appropriate   - strict I&O,   - continue lasix  - electrolyte replacement protocols   - increase free water to 200 ml q4     GI  # at risk for malnutrition  - continue tube feeds  - bowel regimen, PPI     Heme/Onc  # Coagulopathy 2/2 COVID-19  - monitor CBC  - intermediate dose lovenox    ID  # COVID-19 pneumonia, completed remdesivir , 1 dose toci  - dexamethasone 12mg, finished  - follow clinically     Endo  # DM2 w/ hyperglycemia secondary to steroids, improving now that steroids are finished  # Hypothyroidism  - monitor BG  - increase Lantus to 25 U QPM  - continue PTA levothyroxine supplementation    PPX  1. DVT: enoxaparin   2. VAP: HOB 30 degrees, chlorhexidine rinse  3. Stress Ulcer: PPI  4. Restraints: Nonviolent soft two point restraints required and necessary for patient safety and continued cares and good effect as patient continues to pull at necessary lines, tubes despite education and distraction. Will readdress daily.   5. Wound care - per unit routine   6. Feeding - TF  7. Family:  Will call after rounds    Pb Jose MD   SICU Fellow      Interval Hx:  Patient intermittently on and off levophed, pronating daily, with minimal improvement in her PaO2    /64   Pulse 72   Temp 97.5  F (36.4  C)   Resp 20   Wt  82.2 kg (181 lb 3.5 oz)   SpO2 94%   BMI 28.38 kg/m       Gen: intubated, sedated, prone, appears older than stated age, NAD  Neuro: sedated, does not respond to painful stimuli  HEENT: anicteric, ETT in place, conjunctiva pink  Card: RRR, no m/r/g, NSR on tele  Pulm: anteriorly clear , muffled breath sounds on auscultation synchronous with vent   Abd: soft, nontender, no palpable masses (limited exam in prone position)  MSK: trace edema, warm   Skin: no obvious rash     Ventilation Mode: CMV/AC  (Continuous Mandatory Ventilation/ Assist Control)  FiO2 (%): 50 %  Rate Set (breaths/minute): 20 breaths/min  Tidal Volume Set (mL): 460 mL  PEEP (cm H2O): 14 cmH2O  Oxygen Concentration (%): 50 %  Resp: 20        Intake/Output Summary (Last 24 hours) at 12/5/2021 0913  Last data filed at 12/5/2021 0600  Gross per 24 hour   Intake 2353.95 ml   Output 1165 ml   Net 1188.95 ml     ROUTINE ICU LABS (Last four results)  CMP  Recent Labs   Lab 12/13/21  0920 12/13/21  0427 12/13/21  0416 12/12/21  2338 12/12/21  0751 12/12/21  0506 12/11/21  1552 12/11/21  1429 12/11/21  0429 12/11/21  0424 12/10/21  0843 12/10/21  0400 12/09/21  0609 12/09/21  0416 12/08/21  0548 12/08/21  0433 12/07/21  0852 12/07/21  0420   NA  --   --  146*  --   --  145*  --  148*  --  145*  --  143  --  144  --  145*  --  148*   POTASSIUM  --   --  3.6  --   --  3.9  --  4.4  --  4.5  --  3.9  --  4.1  --  4.6  --  4.7   CHLORIDE  --   --  104  --   --  106  --   --   --  107  --  106  --  109  --  110*  --  110*   CO2  --   --  39*  --   --  39*  --   --   --  36*  --  34*  --  35*  --  33*  --  35*   ANIONGAP  --   --  3  --   --  <1*  --   --   --  2*  --  3  --  <1*  --  2*  --  3   * 204* 223* 211*   < > 279*   < >  --    < > 221*   < > 274*  251*   < > 75   < > 170*   < > 266*  238*   BUN  --   --  38*  --   --  39*  --   --   --  42*  --  53*  --  54*  --  49*  --  46*   CR  --   --  0.58  --   --  0.56  --   --   --  0.59  --  0.62  --   0.69  --  0.65  --  0.59   GFRESTIMATED  --   --  90  --   --  >90  --   --   --  90  --  88  --  85  --  87  --  90   LEENA  --   --  8.3*  --   --  8.2*  --   --   --  8.0*  --  7.6*  --  8.1*  --  8.4*  --  8.4*   MAG  --   --  2.8*  --   --   --   --  2.8*  --  2.9*  --   --   --  2.9*  --   --   --   --    PHOS  --   --  4.1  --   --   --   --   --   --   --   --   --   --  3.8  --  3.9  --  2.9    < > = values in this interval not displayed.     CBC  Recent Labs   Lab 12/13/21  0416 12/12/21  0506 12/11/21  0424 12/10/21  0400   WBC 10.5 15.6* 11.8* 10.7   RBC 3.80 4.08 4.12 4.07   HGB 11.1* 11.8 11.7 11.7   HCT 36.6 38.9 39.0 38.4   MCV 96 95 95 94   MCH 29.2 28.9 28.4 28.7   MCHC 30.3* 30.3* 30.0* 30.5*   RDW 14.9 14.6 14.2 14.1    199 219 252     INR  No lab results found in last 7 days.  Arterial Blood Gas  Recent Labs   Lab 12/13/21  0418 12/12/21  1224 12/12/21  0507 12/11/21  1553   PH 7.47* 7.46* 7.45 7.41   PCO2 60* 59* 60* 61*   PO2 82 93 91 135*   HCO3 44* 42* 41* 39*   O2PER 50 70 55 70     No results found for this or any previous visit (from the past 24 hour(s)).    Labs: reviewed  Imaging: reviewed    Pb Jose MD   SICU Fellow

## 2021-12-13 NOTE — PROGRESS NOTES
Supine this morning, reproned this evening. Versed, fentanyl, and norepi drips. Decreasing secretions via ETT. fio2 70% when supine, able to tolerate 50% when prone. A few small loose stools today.

## 2021-12-14 NOTE — PLAN OF CARE
Neuro: No withdrawals, PERRL. RASS -4. Versed and Fentanyl for sedation and pain.   CV/Tele: SR. Sbp and MAP> 65 maintained w/Levo.   Resp: remains vented. FiO2 60%, , PEEP 14 rate 20.  GI/: Ivey patent. Diuresing w/ lasix. No BM this shift. TF @goal.   Skin: Meplix to coccyx. Small ulceration to Left side of upper lip.     Plan: Care conference w/ family today. Family deciding on patient continuing care and possibly having a Trach/PEG within the next week.

## 2021-12-14 NOTE — PLAN OF CARE
Neuro: No response to pain. RASS -4. PERRLA. Versed for sedation and Fent for pain infusing.   CV/Tele: SR. VSS w/ Levo infusing.   Resp: LS diminished. Vent set at rate 20, PEEP 14, FiO2 55%, .   GI/: TF at goal. Flushes increased to 200 Q4. Loose stool this shift. Ivey patent. Diuresing w/ lasix.   Skin: scattered bruises. Turned to supine position at 1130.

## 2021-12-14 NOTE — PROGRESS NOTES
ICU Progress Note    ICU Day: 5  Vent Day 5  Hosp Day: 16      HOSPITAL COURSE  77 yo female with PMH of DEX, DM aortic stenosis , hypothyroidism admit 11/28 with fever, dyspnea diagnosed with COVID PNA 11/28 (home test) though 2 week of symptoms prior. Unvaccinated and did not receive monoclonal Ig. In ED sats mid 80 on RA corrected with 3L however escalating and confused.  Initially admitted to floor on BiPAP however failed and intubtated and transferred to ICU 12/1  Requiring paralysis and proning    12/1 - transfer to ICU Bipap 100% 16/10   12/2 - Intubated, lines (CVC, Art line, Ivey, OG), paralyzed and proned   P/F ratio = 78.   12/3 - Sedation and paralytic weaned d/t bradycardia. Supined at 1942-8631; re-proned at 2000.  12/4 - prone, supine, sugars elevated, on/off low dose levophed   12/6- pronated for ~18 hr/day all weekend, on low dose levophed. Paralytic weaned off yesterday am  12/7- Very high FSBGs despite changes to sliding scale insulin and lantus yesterday, supinated 6 hours yesterday and pronated again.   12/8- supinated for 24 hours, PF ratio >150  12/10- pronated over the weekend. No appreciable improvement in PF ratio  12/14- veletri restarted with marginal improvement in PF ratio, supination-->no change in PF ratio    ASSESSMENT/PLAN:  76F DEX, DM2, aortic stenosis, hypothyroidism admitted 11/28 for acute hypoxemic respiratory failure 2/2 COVID-19. Transferred to ICU 12/1. Intubated 12/2.   Proned and paralyzed. Decadron and Toci    I have personally reviewed the daily labs, imaging studies, cultures and discussed the case with referring physician and consulting physicians.     Neuro  # Sedation for mechanical ventilation  # encephalopathy, metabolic  - continue fentanyl/versed for sedation,  - titrate sedation for a RASS of -3 to -4    Pulmonary  # ARDS secondary to covid 19 pneumonia  # DEX  # Acute hypoxemic respiratory failure - oxygenation, mechanics acceptable    Ventilation Mode:  CMV/AC  (Continuous Mandatory Ventilation/ Assist Control)  FiO2 (%): 55 %  Rate Set (breaths/minute): 20 breaths/min  Tidal Volume Set (mL): 460 mL  PEEP (cm H2O): 14 cmH2O  Oxygen Concentration (%): 55 %  Resp: 19  - low tidal volume, lung protection ventilation  - continue epoprostenol   - albuterol nebs  - wean FiO2 as toleratd    Cardiac  # Shock - 2/2 sepsis, sedation, obstruction 2/2 positive pressure ventilation  # sinus bradycardia, improved  # Aortic stenosis   - wean/titrate levophed for goal MAP > 64, follow markers of perfusion, if jeannine persists will consider changing to epinephrine  - aim even fluid status to net negative, continue to diurese this AM    Renal  Fluid overload, net positive 6.6 L since admission  # cr, UOP appropriate   - strict I&O,   - lasix 40 mg IV Q12hr x2  - electrolyte replacement protocols   - decrease free water to 100 ml q4     GI  # at risk for malnutrition  - continue tube feeds  - bowel regimen, PPI     Heme/Onc  # Coagulopathy 2/2 COVID-19  - monitor CBC  - intermediate dose lovenox    ID  # COVID-19 pneumonia, completed remdesivir , 1 dose toci  - dexamethasone 12mg, finished  - follow clinically     Endo  # DM2 w/ hyperglycemia secondary to steroids, received 28 U of sliding scale insulin yesterday  # Hypothyroidism  - monitor BG  - increase Lantus to 20 U BID  - continue PTA levothyroxine supplementation    PPX  1. DVT: enoxaparin   2. VAP: HOB 30 degrees, chlorhexidine rinse  3. Stress Ulcer: PPI  4. Restraints: Nonviolent soft two point restraints required and necessary for patient safety and continued cares and good effect as patient continues to pull at necessary lines, tubes despite education and distraction. Will readdress daily.   5. Wound care - per unit routine   6. Feeding - TF  7. Family:  Care conference at 1300    Pb Jose MD   SICU Fellow      Interval Hx:  Marginal improvement in PF ratio with restarting veletri    /64   Pulse 58   Temp  98.2  F (36.8  C)   Resp 19   Wt 84.2 kg (185 lb 10 oz)   SpO2 94%   BMI 29.07 kg/m       Gen: intubated, sedated, supine, appears older than stated age, NAD  Neuro: sedated, does not respond to painful stimuli  HEENT: anicteric, ETT in place, conjunctiva pink, AT/NC  Card: RRR, no m/r/g, visible P waves on tele  Pulm: muffled, coarse breath sounds all fields (anterior exam)   Abd: soft, nontender, no palpable masses (limited exam in prone position)  MSK: trace edema, warm   Skin: no obvious rash     Ventilation Mode: CMV/AC  (Continuous Mandatory Ventilation/ Assist Control)  FiO2 (%): 55 %  Rate Set (breaths/minute): 20 breaths/min  Tidal Volume Set (mL): 460 mL  PEEP (cm H2O): 14 cmH2O  Oxygen Concentration (%): 55 %  Resp: 19        Intake/Output Summary (Last 24 hours) at 12/5/2021 0913  Last data filed at 12/5/2021 0600  Gross per 24 hour   Intake 2353.95 ml   Output 1165 ml   Net 1188.95 ml     ROUTINE ICU LABS (Last four results)  CMP  Recent Labs   Lab 12/14/21  0753 12/14/21  0414 12/14/21  0340 12/13/21  2343 12/13/21  0427 12/13/21  0416 12/12/21  0751 12/12/21  0506 12/11/21  1552 12/11/21  1429 12/11/21  0429 12/11/21  0424 12/09/21  0609 12/09/21  0416 12/08/21  0548 12/08/21  0433   NA  --  144  --   --   --  146*  --  145*  --  148*  --  145*   < > 144  --  145*   POTASSIUM  --  3.6  --   --   --  3.6  --  3.9  --  4.4  --  4.5   < > 4.1  --  4.6   CHLORIDE  --  105  --   --   --  104  --  106  --   --   --  107   < > 109  --  110*   CO2  --  37*  --   --   --  39*  --  39*  --   --   --  36*   < > 35*  --  33*   ANIONGAP  --  2*  --   --   --  3  --  <1*  --   --   --  2*   < > <1*  --  2*   * 236* 251* 251*   < > 223*   < > 279*   < >  --    < > 221*   < > 75   < > 170*   BUN  --  32*  --   --   --  38*  --  39*  --   --   --  42*   < > 54*  --  49*   CR  --  0.61  --   --   --  0.58  --  0.56  --   --   --  0.59   < > 0.69  --  0.65   GFRESTIMATED  --  89  --   --   --  90  --  >90  --    --   --  90   < > 85  --  87   LEENA  --  7.9*  --   --   --  8.3*  --  8.2*  --   --   --  8.0*   < > 8.1*  --  8.4*   MAG  --   --   --   --   --  2.8*  --   --   --  2.8*  --  2.9*  --  2.9*  --   --    PHOS  --   --   --   --   --  4.1  --   --   --   --   --   --   --  3.8  --  3.9    < > = values in this interval not displayed.     CBC  Recent Labs   Lab 12/14/21 0414 12/13/21  0416 12/12/21  0506 12/11/21  0424   WBC 9.0 10.5 15.6* 11.8*   RBC 3.81 3.80 4.08 4.12   HGB 11.0* 11.1* 11.8 11.7   HCT 37.2 36.6 38.9 39.0   MCV 98 96 95 95   MCH 28.9 29.2 28.9 28.4   MCHC 29.6* 30.3* 30.3* 30.0*   RDW 15.3* 14.9 14.6 14.2    165 199 219     INR  No lab results found in last 7 days.  Arterial Blood Gas  Recent Labs   Lab 12/14/21 0415 12/13/21  1513 12/13/21  0418 12/12/21  1224   PH 7.48* 7.47* 7.47* 7.46*   PCO2 59* 58* 60* 59*   PO2 87 80 82 93   HCO3 44* 42* 44* 42*   O2PER 55 55 50 70     No results found for this or any previous visit (from the past 24 hour(s)).    Labs: reviewed  Imaging: reviewed    Pb Jose MD   SICU Fellow

## 2021-12-15 NOTE — PROGRESS NOTES
ICU Progress Note    ICU Day: 5  Vent Day 5  Hosp Day: 17      HOSPITAL COURSE  77 yo female with PMH of DEX, DM aortic stenosis , hypothyroidism admit 11/28 with fever, dyspnea diagnosed with COVID PNA 11/28 (home test) though 2 week of symptoms prior. Unvaccinated and did not receive monoclonal Ig. In ED sats mid 80 on RA corrected with 3L however escalating and confused.  Initially admitted to floor on BiPAP however failed and intubtated and transferred to ICU 12/1  Requiring paralysis and proning    12/1 - transfer to ICU Bipap 100% 16/10   12/2 - Intubated, lines (CVC, Art line, Ivey, OG), paralyzed and proned   P/F ratio = 78.   12/3 - Sedation and paralytic weaned d/t bradycardia. Supined at 3475-9070; re-proned at 2000.  12/4 - prone, supine, sugars elevated, on/off low dose levophed   12/6- pronated for ~18 hr/day all weekend, on low dose levophed. Paralytic weaned off yesterday am  12/7- Very high FSBGs despite changes to sliding scale insulin and lantus yesterday, supinated 6 hours yesterday and pronated again.   12/8- supinated for 24 hours, PF ratio >150  12/10- pronated over the weekend. No appreciable improvement in PF ratio  12/15- slowly worsening PaO2/PF ratio    ASSESSMENT/PLAN:  76F DEX, DM2, aortic stenosis, hypothyroidism admitted 11/28 for acute hypoxemic respiratory failure 2/2 COVID-19. Transferred to ICU 12/1. Intubated 12/2.   Proned and paralyzed. Decadron and Toci    I have personally reviewed the daily labs, imaging studies, cultures and discussed the case with referring physician and consulting physicians.     Neuro  # Sedation for mechanical ventilation  # encephalopathy, metabolic  - continue fentanyl/versed for sedation,  - titrate sedation for a RASS of -2 to -3    Pulmonary  # ARDS secondary to covid 19 pneumonia  # DEX  # Acute hypoxemic respiratory failure - oxygenation, mechanics acceptable    Ventilation Mode: CMV/AC  (Continuous Mandatory Ventilation/ Assist Control)  FiO2  (%): 60 %  Rate Set (breaths/minute): 20 breaths/min  Tidal Volume Set (mL): 400 mL  PEEP (cm H2O): 14 cmH2O  Oxygen Concentration (%): 60 %  Resp: 20  - low tidal volume, lung protection ventilation  - continue epoprostenol   - albuterol nebs  - wean FiO2 as tolerated    Cardiac  # Shock - 2/2 sepsis, sedation, obstruction 2/2 positive pressure ventilation  # sinus bradycardia, improved  # Aortic stenosis   - wean/titrate levophed for goal MAP > 64  - aim even fluid status to net negative, continue to diurese this AM    Renal  Fluid overload, net positive 7.7 L since admission  # cr, UOP appropriate   Hypernatremia, mild  - strict I&O,   - no lasix today  - electrolyte replacement protocols   - decrease free water to 100 ml q4     GI  # at risk for malnutrition  - continue tube feeds  - bowel regimen, PPI     Heme/Onc  # Coagulopathy 2/2 COVID-19  - monitor CBC  - intermediate dose lovenox    ID  # COVID-19 pneumonia, completed remdesivir , 1 dose toci  - dexamethasone 12mg, finished  - follow clinically     Endo  # DM2 w/ hyperglycemia secondary to steroids, received 19 U of sliding scale insulin yesterday  # Hypothyroidism  - monitor BG  - continue Lantus to 20 U BID  - continue PTA levothyroxine supplementation    PPX  1. DVT: enoxaparin   2. VAP: HOB 30 degrees, chlorhexidine rinse  3. Stress Ulcer: PPI  4. Restraints: Nonviolent soft two point restraints required and necessary for patient safety and continued cares and good effect as patient continues to pull at necessary lines, tubes despite education and distraction. Will readdress daily.   5. Wound care - per unit routine   6. Feeding - TF  7. Family:  Care conference at 1300    Pb Jose MD   SICU Fellow      Interval Hx:  PaO2 slowly trending down    /64   Pulse 70   Temp 97.9  F (36.6  C)   Resp 20   Wt 84.2 kg (185 lb 10 oz)   SpO2 93%   BMI 29.07 kg/m       Gen: intubated, sedated, supine, appears older than stated age, NAD  Neuro:  sedated, does not respond to painful stimuli  HEENT: anicteric, ETT in place, conjunctiva pink, AT/NC  Card: RRR, no m/r/g,NSR on monitor  Pulm: muffled, coarse breath sounds all fields (anterior exam)   Abd: soft, nontender, no palpable masses (limited exam in prone position)  MSK: trace edema, warm   Skin: no obvious rash     Ventilation Mode: CMV/AC  (Continuous Mandatory Ventilation/ Assist Control)  FiO2 (%): 60 %  Rate Set (breaths/minute): 20 breaths/min  Tidal Volume Set (mL): 400 mL  PEEP (cm H2O): 14 cmH2O  Oxygen Concentration (%): 60 %  Resp: 20        Intake/Output Summary (Last 24 hours) at 12/5/2021 0913  Last data filed at 12/5/2021 0600  Gross per 24 hour   Intake 2353.95 ml   Output 1165 ml   Net 1188.95 ml     ROUTINE ICU LABS (Last four results)  CMP  Recent Labs   Lab 12/15/21  0827 12/15/21  0525 12/15/21  0343 12/15/21  0023 12/14/21  0753 12/14/21  0414 12/13/21  0427 12/13/21  0416 12/12/21  0751 12/12/21  0506 12/11/21  1552 12/11/21  1429 12/11/21  0429 12/11/21  0424 12/09/21  0609 12/09/21  0416   NA  --  145*  --   --   --  144  --  146*  --  145*  --  148*  --  145*   < > 144   POTASSIUM  --  3.9  --   --   --  3.6  --  3.6  --  3.9  --  4.4  --  4.5   < > 4.1   CHLORIDE  --  103  --   --   --  105  --  104  --  106  --   --   --  107   < > 109   CO2  --  40*  --   --   --  37*  --  39*  --  39*  --   --   --  36*   < > 35*   ANIONGAP  --  2*  --   --   --  2*  --  3  --  <1*  --   --   --  2*   < > <1*   * 222* 212* 195*   < > 236*   < > 223*   < > 279*   < >  --    < > 221*   < > 75   BUN  --  33*  --   --   --  32*  --  38*  --  39*  --   --   --  42*   < > 54*   CR  --  0.65  --   --   --  0.61  --  0.58  --  0.56  --   --   --  0.59   < > 0.69   GFRESTIMATED  --  87  --   --   --  89  --  90  --  >90  --   --   --  90   < > 85   LEENA  --  8.7  --   --   --  7.9*  --  8.3*  --  8.2*  --   --   --  8.0*   < > 8.1*   MAG  --   --   --   --   --   --   --  2.8*  --   --   --   2.8*  --  2.9*  --  2.9*   PHOS  --   --   --   --   --   --   --  4.1  --   --   --   --   --   --   --  3.8    < > = values in this interval not displayed.     CBC  Recent Labs   Lab 12/15/21  0525 12/14/21  0414 12/13/21  0416 12/12/21  0506   WBC 9.0 9.0 10.5 15.6*   RBC 3.81 3.81 3.80 4.08   HGB 11.2* 11.0* 11.1* 11.8   HCT 37.5 37.2 36.6 38.9   MCV 98 98 96 95   MCH 29.4 28.9 29.2 28.9   MCHC 29.9* 29.6* 30.3* 30.3*   RDW 15.5* 15.3* 14.9 14.6    157 165 199     INR  No lab results found in last 7 days.  Arterial Blood Gas  Recent Labs   Lab 12/15/21  0525 12/14/21  1633 12/14/21  0415 12/13/21  1513   PH 7.45 7.44 7.48* 7.47*   PCO2 63* 65* 59* 58*   PO2 73* 81 87 80   HCO3 43* 44* 44* 42*   O2PER 60 60 55 55     No results found for this or any previous visit (from the past 24 hour(s)).    Labs: reviewed  Imaging: reviewed    Pb Jose MD   SICU Fellow

## 2021-12-15 NOTE — PLAN OF CARE
Neuro: RASS -5: No withdrawal. PERRL. Versed  Cardiac: SR. Levo   Pulmonary: ETT. Small creamy secretion.   GI: OG @ goal. 1x loose small BM overnight.  : Ivey. Good output.   Skin: Scattered bruising. Wound scab left upper lip.  Lines: Rt radial artery. RT triple lumen.  Drips: Levo + fentanyl + versed + full veletri    Activity: bedrest  Restraints: NA    Plan: Possible trach + peg next week.

## 2021-12-16 NOTE — PROGRESS NOTES
"SPIRITUAL HEALTH SERVICES Progress Note  [facility] ICU    SH phone call - ICU requesting we connect with Ptnt's daughter re ritual.    I rang Sandra at her cell number; she told of her mother's illness and the family having had a conference after realizing Mavis's \"numbers are going in the wrong direction.\" They are preparing to transition to [employer]. Sandra said they are [employer] and it was very important to first have annointing.    I rang the [employer], who said they'd send a  as soon as possible, by tonight or tomorrow morning, and I then called Sandra back to share this information and offer words of support and comfort.    I also shared this with the ICU Charge.     remains available.    Alyx Moreno  Associate   "

## 2021-12-16 NOTE — PLAN OF CARE
Neuro: RASS -5: No withdrawal. PERRL. Versed  Cardiac: SR. Levo   Pulmonary: ETT. Small creamy secretion.   GI: OG @ goal. 1x loose small BM overnight.  : Ivey. Adequate output.   Skin: Scattered bruising. Wound scab left upper lip.  Lines: Rt radial artery. RT triple lumen.  Drips: Levo + fentanyl + versed + full veletri    Activity: bedrest  Restraints: NA     Plan: Possible trach + peg next week.

## 2021-12-16 NOTE — PROGRESS NOTES
CLINICAL NUTRITION SERVICES - REASSESSMENT NOTE      RECOMMENDATIONS FOR MD/PROVIDER TO ORDER: Lantus adjustments per MD discretion    Malnutrition: (12/2)  % Weight Loss:  > 2% in 1 week (severe malnutrition)  % Intake:  </= 50% for >/= 5 days (severe malnutrition)  Subcutaneous Fat Loss:  Deferred with COVID-19 precautions   Muscle Loss:  Deferred with COVID-19 precautions   Fluid Retention: Does not meet criteria - trace generalized      Malnutrition Diagnosis: Severe malnutrition  In Context of:  Acute illness or injury        EVALUATION OF PROGRESS TOWARD GOALS   Diet:  NPO on vent     Nutrition Support:  Patient continues on goal TF regimen as follows ~    Nutrition Support Enteral:  Type of Feeding Tube: NG  Enteral Frequency:  Continuous  Enteral Regimen: Osmolite 1.5 at 60 mL/hr x 22 hours per day (holding TF 1 hour before and 1 hour after Levothyroxine administration)  Total Enteral Provisions: 1980 kcal (30 kcal/kg), 83 g protein (1.2 g/kg), 269 g CHO, 0 g fiber, 1006 mL H2O  Free Water Flush: 200 mL every 4 hours     Intake/Tolerance:    K normal  BGM  with High sliding scale insulin and Lantus 20 units BID   BM x 2 today and x 2 yesterday -- Holding bowel meds today but did receive yesterday   I/O 2748/1360, wt 85 kg (stable for last week)      ASSESSED NUTRITION NEEDS:  DW: 67 kg (adjusted)  Energy Needs: 2715-2339 kcals (25-30 Kcal/Kg) - overweight on vent    Protein Needs:  grams protein (1.2-1.5 g pro/Kg) - hypercatabolism with critical illness       NEW FINDINGS:   12/13:  Supine     Per rounds, respiratory status worsening     Previous Goals (12/13):   BGM < 180  Evaluation: Not met    Previous Nutrition Diagnosis (12/13):   Altered nutrition-related lab value (high glucoses) related to stress, DM as evidenced by BGM > 180   Evaluation: No change      CURRENT NUTRITION DIAGNOSIS  Altered nutrition-related lab value (high glucoses) related to stress, DM as evidenced by BGM > 180      INTERVENTIONS  Recommendations / Nutrition Prescription  Continue Osmolite 1.5 at 60 mL/hr as above   Lantus adjustments per MD discretion     Implementation  Collaboration and Referral of Nutrition care:  Patient discussed today during interdisciplinary bedside rounds     Goals  TF will continue to meet % needs   BGM< 180     MONITORING AND EVALUATION:  Progress towards goals will be monitored and evaluated per protocol and Practice Guidelines    Loretta Ritchie RD, LD, CNSC   Clinical Dietitian - [facility]

## 2021-12-16 NOTE — PROGRESS NOTES
ICU Progress Note    ICU Day: 5  Vent Day 5  Hosp Day: 18      HOSPITAL COURSE  77 yo female with PMH of DEX, DM aortic stenosis , hypothyroidism admit 11/28 with fever, dyspnea diagnosed with COVID PNA 11/28 (home test) though 2 week of symptoms prior. Unvaccinated and did not receive monoclonal Ig. In ED sats mid 80 on RA corrected with 3L however escalating and confused.  Initially admitted to floor on BiPAP however failed and intubtated and transferred to ICU 12/1  Requiring paralysis and proning    12/1 - transfer to ICU Bipap 100% 16/10   12/2 - Intubated, lines (CVC, Art line, Ivey, OG), paralyzed and proned   P/F ratio = 78.   12/3 - Sedation and paralytic weaned d/t bradycardia. Supined at 2816-6382; re-proned at 2000.  12/4 - prone, supine, sugars elevated, on/off low dose levophed   12/6- pronated for ~18 hr/day all weekend, on low dose levophed. Paralytic weaned off yesterday am  12/7- Very high FSBGs despite changes to sliding scale insulin and lantus yesterday, supinated 6 hours yesterday and pronated again.   12/8- supinated for 24 hours, PF ratio >150  12/10- pronated over the weekend. No appreciable improvement in PF ratio  12/15- slowly worsening PaO2/PF ratio  12/16- continuing to decline in oxygenation status, FiO2 up to 90% this am, PF ratio <100 this am    ASSESSMENT/PLAN:  76F DEX, DM2, aortic stenosis, hypothyroidism admitted 11/28 for acute hypoxemic respiratory failure 2/2 COVID-19. Transferred to ICU 12/1. Intubated 12/2.   Proned and paralyzed. Decadron and Toci.  Patient has been steadily declining for the last several days with worsening oxygenation and ventilation. Veletri restarted with minimal to no benefit. Pronation also did not improve oxygenation/ventilation    I have personally reviewed the daily labs, imaging studies, cultures and discussed the case with referring physician and consulting physicians.     Neuro  # Sedation for mechanical ventilation  # encephalopathy,  metabolic  - continue fentanyl/versed for sedation,  - titrate sedation for a RASS of -2 to -3    Pulmonary  # ARDS secondary to covid 19 pneumonia, worsening oxygenation and ventilation.   # DEX  # Acute hypoxemic respiratory failure - oxygenation, mechanics acceptable    Ventilation Mode: CMV/AC  (Continuous Mandatory Ventilation/ Assist Control)  FiO2 (%): 90 %  Rate Set (breaths/minute): 20 breaths/min  Tidal Volume Set (mL): 400 mL  PEEP (cm H2O): 14 cmH2O  Oxygen Concentration (%): 85 %  Resp: 19  - low tidal volume, lung protection ventilation  - continue Ve  - albuterol nebs  - PEEP increased to 16  - recheck gas at 1100    Cardiac  # Shock - 2/2 sepsis, sedation, obstruction 2/2 positive pressure ventilation  # sinus bradycardia, improved  # Aortic stenosis   - wean/titrate levophed for goal MAP > 64    Renal  Fluid overload, net positive 7.7 L since admission  # cr, UOP appropriate   Hypernatremia, mild, stable  - strict I&O,   - no lasix today  - electrolyte replacement protocols   - decrease free water to 100 ml q4     GI  # at risk for malnutrition  - continue tube feeds  - bowel regimen, PPI     Heme/Onc  # Coagulopathy 2/2 COVID-19  - monitor CBC  - intermediate dose lovenox    ID  # COVID-19 pneumonia, completed remdesivir , 1 dose toci  - dexamethasone 12mg, finished  - follow clinically     Endo  # DM2 w/ hyperglycemia secondary to steroids, received 19 U of sliding scale insulin yesterday  # Hypothyroidism  - monitor BG  - continue Lantus to 20 U BID  - continue PTA levothyroxine supplementation    PPX  1. DVT: enoxaparin   2. VAP: HOB 30 degrees, chlorhexidine rinse  3. Stress Ulcer: PPI  4. Restraints: Nonviolent soft two point restraints required and necessary for patient safety and continued cares and good effect as patient continues to pull at necessary lines, tubes despite education and distraction. Will readdress daily.   5. Wound care - per unit routine   6. Feeding - TF  7. Family:  Care  conference at 1300    Pb Jose MD   SICU Fellow      Interval Hx:  Respiratory status continues to decline. Daughter made patient DNR yesterday    /64   Pulse 76   Temp 99  F (37.2  C)   Resp 19   Wt 85 kg (187 lb 6.3 oz)   SpO2 91%   BMI 29.35 kg/m       Gen: intubated, sedated, supine, appears older than stated age, NAD  Neuro: sedated, does not respond to painful stimuli, +cough reflex  HEENT: anicteric, ETT in place, conjunctiva pink, AT/NC  Card: RRR, no m/r/g,NSR on monitor  Pulm: muffled, coarse breath sounds all fields (anterior exam)   Abd: soft, nontender, no palpable masses, no appreciable hepatosplenomegaly (limited exam in prone position)  MSK: trace edema, warm   Skin: no obvious rash     Ventilation Mode: CMV/AC  (Continuous Mandatory Ventilation/ Assist Control)  FiO2 (%): 90 %  Rate Set (breaths/minute): 20 breaths/min  Tidal Volume Set (mL): 400 mL  PEEP (cm H2O): 14 cmH2O  Oxygen Concentration (%): 85 %  Resp: 19        Intake/Output Summary (Last 24 hours) at 12/5/2021 0913  Last data filed at 12/5/2021 0600  Gross per 24 hour   Intake 2353.95 ml   Output 1165 ml   Net 1188.95 ml     ROUTINE ICU LABS (Last four results)  CMP  Recent Labs   Lab 12/16/21  0800 12/16/21  0449 12/16/21  0448 12/15/21  2326 12/15/21  0827 12/15/21  0525 12/14/21  0753 12/14/21  0414 12/13/21  0427 12/13/21  0416 12/11/21  1552 12/11/21  1429 12/11/21  0429 12/11/21  0424   NA  --  144  --   --   --  145*  --  144  --  146*   < > 148*  --  145*   POTASSIUM  --  3.9  --   --   --  3.9  --  3.6  --  3.6   < > 4.4  --  4.5   CHLORIDE  --  104  --   --   --  103  --  105  --  104   < >  --   --  107   CO2  --  40*  --   --   --  40*  --  37*  --  39*   < >  --   --  36*   ANIONGAP  --  <1*  --   --   --  2*  --  2*  --  3   < >  --   --  2*   * 197* 195* 225*   < > 222*   < > 236*   < > 223*   < >  --    < > 221*   BUN  --  32*  --   --   --  33*  --  32*  --  38*   < >  --   --  42*   CR  --   0.64  --   --   --  0.65  --  0.61  --  0.58   < >  --   --  0.59   GFRESTIMATED  --  88  --   --   --  87  --  89  --  90   < >  --   --  90   LEENA  --  8.4*  --   --   --  8.7  --  7.9*  --  8.3*   < >  --   --  8.0*   MAG  --   --   --   --   --   --   --   --   --  2.8*  --  2.8*  --  2.9*   PHOS  --   --   --   --   --   --   --   --   --  4.1  --   --   --   --     < > = values in this interval not displayed.     CBC  Recent Labs   Lab 12/16/21  0449 12/15/21  0525 12/14/21  0414 12/13/21  0416   WBC 8.1 9.0 9.0 10.5   RBC 3.74* 3.81 3.81 3.80   HGB 10.9* 11.2* 11.0* 11.1*   HCT 36.7 37.5 37.2 36.6   MCV 98 98 98 96   MCH 29.1 29.4 28.9 29.2   MCHC 29.7* 29.9* 29.6* 30.3*   RDW 15.8* 15.5* 15.3* 14.9   * 157 157 165     INR  No lab results found in last 7 days.  Arterial Blood Gas  Recent Labs   Lab 12/16/21  0449 12/15/21  0525 12/14/21  1633 12/14/21  0415   PH 7.44 7.45 7.44 7.48*   PCO2 61* 63* 65* 59*   PO2 76* 73* 81 87   HCO3 42* 43* 44* 44*   O2PER 85 60 60 55     Recent Results (from the past 24 hour(s))   XR Chest Port 1 View    Narrative    CHEST ONE VIEW December 15, 2021 10:10 AM     HISTORY: COVID pneumonia.    COMPARISON: December 5, 2021.      Impression    IMPRESSION: Endotracheal tube tip 5.1 cm from the isela. Right IJ  line unchanged. Enteric tube tip below the margin of the study.  Increased infiltrate in the right upper lobe since the comparison  study. Slight increase in bibasilar infiltrates compared to previous.    EVELYN AU MD         SYSTEM ID:  [id number]       Labs: reviewed  Imaging: reviewed    Pb Jose MD   SICU Fellow

## 2021-12-17 NOTE — PROGRESS NOTES
SPIRITUAL HEALTH SERVICES Significant Event  [facility] Sacrament of ANOINTING  [facility] UNIT ICU    Pt anointed by Father Joe of COVID anointing corps per request of daughter.    Halie Gilbert M.Div.  Associate   Pager: [phone]

## 2021-12-17 NOTE — PROGRESS NOTES
"SPIRITUAL HEALTH SERVICES: Tele-Encounter  Patient Location (Hospital, [facility], Unit): [facility] ICU  Spoke with (patient, family relationship): Sandra (daughter) and Cipriano (son)      Referral Source: Page    If applicable: patient was appropriately screened for telechaplaincy support with bedside nurse prior to visit (e.g. Mental Health and Addiction contexts). See call details below.    DATA:    Spoke with Sandra and Cipriano who shared the distress that they are feeling about making the decision to move their mother to comfort measures. \"We know the doctor can't tell us what to do.\" Met with Dr Jose who plans to follow up with family later today.  They shared that they have an advanced care document from their mother which they've \"combed through\" and it has been helpful for them to know that \"she wouldn't want to go into long term care or be intubated for another two months.\"   They shared their fear \"that she might code and die alone.\" Offered assurance of staff presence and care for their mother. They are aware that they can request [facility] support for their mother and for themselves, if desired.  Cipriano shared his hope of coordinating the move to comfort care, so that family can say their goodbyes, whether in-person or virtually.   Family expressed gratitude for the arrangements made for a Covid Core  being able to go into the room to bring the Sacrament of Anointing last evening.    I offered spiritual, emotional, and grief support through reflective listening that affirmed emotions, experience, and meaning.     PLAN:  Updated doctor and care coordinator regarding conversation with family. [facility] remains available for support.    Danial Nuñez    ______________________________    Type of service:  Telephone Visit     has received verbal consent for a TelephoneVisit from the patient? Yes    Distance Provider Location: designated [place] office or home office (secure setting)    Mode of " Communication: telephone (via [employer] phone or [employer] tele-call-number ([phone]))

## 2021-12-17 NOTE — PROGRESS NOTES
ICU Progress Note    ICU Day: 5  Vent Day 5  Hosp Day: 19      HOSPITAL COURSE  77 yo female with PMH of DEX, DM aortic stenosis , hypothyroidism admit 11/28 with fever, dyspnea diagnosed with COVID PNA 11/28 (home test) though 2 week of symptoms prior. Unvaccinated and did not receive monoclonal Ig. In ED sats mid 80 on RA corrected with 3L however escalating and confused.  Initially admitted to floor on BiPAP however failed and intubtated and transferred to ICU 12/1  Requiring paralysis and proning    12/1 - transfer to ICU Bipap 100% 16/10   12/2 - Intubated, lines (CVC, Art line, Ivey, OG), paralyzed and proned   P/F ratio = 78.   12/3 - Sedation and paralytic weaned d/t bradycardia. Supined at 8586-4148; re-proned at 2000.  12/4 - prone, supine, sugars elevated, on/off low dose levophed   12/6- pronated for ~18 hr/day all weekend, on low dose levophed. Paralytic weaned off yesterday am  12/7- Very high FSBGs despite changes to sliding scale insulin and lantus yesterday, supinated 6 hours yesterday and pronated again.   12/8- supinated for 24 hours, PF ratio >150  12/10- pronated over the weekend. No appreciable improvement in PF ratio  12/15- slowly worsening PaO2/PF ratio  12/16- continuing to decline in oxygenation status, FiO2 up to 90% this am, PF ratio <100 this am  12/17- FiO2 at 100% this am, PEEP still at 16, PF ratio<100      ASSESSMENT/PLAN:  76F DEX, DM2, aortic stenosis, hypothyroidism admitted 11/28 for acute hypoxemic respiratory failure 2/2 COVID-19. Transferred to ICU 12/1. Intubated 12/2.   Proned and paralyzed. Decadron and Toci.  Patient has been steadily declining for the last several days with worsening oxygenation and ventilation. Veletri restarted with minimal to no benefit so it was stopped. Pronation also did not improve oxygenation/ventilation  Ongoing discussions with the family about goals of care    I have personally reviewed the daily labs, imaging studies, cultures and discussed  the case with referring physician and consulting physicians.     Neuro  # Sedation for mechanical ventilation  # encephalopathy, metabolic  - continue fentanyl/versed for sedation,  - titrate sedation for a RASS of -2 to -3    Pulmonary  # ARDS secondary to covid 19 pneumonia, worsening oxygenation and ventilation.   # DEX  # Acute hypoxemic respiratory failure - oxygenation, mechanics acceptable    Ventilation Mode: CMV/AC  (Continuous Mandatory Ventilation/ Assist Control)  FiO2 (%): 100 %  Rate Set (breaths/minute): 18 breaths/min  Tidal Volume Set (mL): 400 mL  PEEP (cm H2O): 16 cmH2O  Oxygen Concentration (%): 100 %  Resp: 14  - low tidal volume, lung protection ventilation  - albuterol nebs    Cardiac  # Shock - 2/2 sepsis, sedation, obstruction 2/2 positive pressure ventilation  # sinus bradycardia, improved  # Aortic stenosis   - wean/titrate levophed for goal MAP > 64    Renal  Fluid overload, net positive 10.6 L since admission  # cr, UOP appropriate   Hypernatremia, improved  - strict I&O,   - no lasix today  - electrolyte replacement protocols   - continue free water to 100 ml q4     GI  # at risk for malnutrition  - continue tube feeds  - bowel regimen, PPI     Heme/Onc  # Coagulopathy 2/2 COVID-19  - monitor CBC  - intermediate dose lovenox    ID  # COVID-19 pneumonia, completed remdesivir , 1 dose toci, dexamethasone  - follow clinically     Endo  # DM2 w/ hyperglycemia secondary to steroids, received 19 U of sliding scale insulin yesterday  # Hypothyroidism  - monitor BG  - continue Lantus 20 U BID  - continue PTA levothyroxine supplementation    PPX  1. DVT: enoxaparin   2. VAP: HOB 30 degrees, chlorhexidine rinse  3. Stress Ulcer: PPI  4. Restraints: Nonviolent soft two point restraints required and necessary for patient safety and continued cares and good effect as patient continues to pull at necessary lines, tubes despite education and distraction. Will readdress daily.   5. Wound care - per  unit routine   6. Feeding - TF  7. Family:  Care conference at 1300    Pb Jose MD   SICU Fellow      Interval Hx:  Respiratory status continues to decline.     /54   Pulse 79   Temp 99.5  F (37.5  C)   Resp 14   Wt 84.8 kg (186 lb 15.2 oz)   SpO2 96%   BMI 29.28 kg/m       Gen: intubated, sedated, laying in bed supine, appears older than stated age, NAD  Neuro: sedated, no response to painful stimuli,   HEENT: anicteric, ETT in place, conjunctiva pink  Card: RRR, no m/r/g,NSR on monitor  Pulm: muffled, coarse breath sounds all fields (anterior exam)   Abd: soft, nontender, no palpable masses, no appreciable hepatosplenomegaly MSK: trace edema, warm   Skin: no obvious rash     Ventilation Mode: CMV/AC  (Continuous Mandatory Ventilation/ Assist Control)  FiO2 (%): 100 %  Rate Set (breaths/minute): 18 breaths/min  Tidal Volume Set (mL): 400 mL  PEEP (cm H2O): 16 cmH2O  Oxygen Concentration (%): 100 %  Resp: 14        Intake/Output Summary (Last 24 hours) at 12/5/2021 0913  Last data filed at 12/5/2021 0600  Gross per 24 hour   Intake 2353.95 ml   Output 1165 ml   Net 1188.95 ml     ROUTINE ICU LABS (Last four results)  CMP  Recent Labs   Lab 12/17/21  0809 12/17/21  0400 12/17/21  0359 12/17/21  0042 12/16/21  0800 12/16/21  0449 12/15/21  0827 12/15/21  0525 12/14/21  0753 12/14/21  0414 12/13/21  0427 12/13/21  0416 12/11/21  1552 12/11/21  1429 12/11/21  0429 12/11/21  0424   NA  --  144  --   --   --  144  --  145*  --  144  --  146*   < > 148*  --  145*   POTASSIUM  --  3.6  --   --   --  3.9  --  3.9  --  3.6  --  3.6   < > 4.4  --  4.5   CHLORIDE  --  106  --   --   --  104  --  103  --  105  --  104   < >  --   --  107   CO2  --  38*  --   --   --  40*  --  40*  --  37*  --  39*   < >  --   --  36*   ANIONGAP  --  <1*  --   --   --  <1*  --  2*  --  2*  --  3   < >  --   --  2*   * 214* 204* 150*   < > 197*   < > 222*   < > 236*   < > 223*   < >  --    < > 221*   BUN  --  29  --   --    --  32*  --  33*  --  32*  --  38*   < >  --   --  42*   CR  --  0.62  --   --   --  0.64  --  0.65  --  0.61  --  0.58   < >  --   --  0.59   GFRESTIMATED  --  88  --   --   --  88  --  87  --  89  --  90   < >  --   --  90   LEENA  --  8.6  --   --   --  8.4*  --  8.7  --  7.9*  --  8.3*   < >  --   --  8.0*   MAG  --   --   --   --   --   --   --   --   --   --   --  2.8*  --  2.8*  --  2.9*   PHOS  --  4.2  --   --   --   --   --   --   --   --   --  4.1  --   --   --   --     < > = values in this interval not displayed.     CBC  Recent Labs   Lab 12/17/21  0400 12/16/21  0449 12/15/21  0525 12/14/21  0414   WBC 8.1 8.1 9.0 9.0   RBC 3.66* 3.74* 3.81 3.81   HGB 10.7* 10.9* 11.2* 11.0*   HCT 36.0 36.7 37.5 37.2   MCV 98 98 98 98   MCH 29.2 29.1 29.4 28.9   MCHC 29.7* 29.7* 29.9* 29.6*   RDW 15.9* 15.8* 15.5* 15.3*   * 148* 157 157     INR  No lab results found in last 7 days.  Arterial Blood Gas  Recent Labs   Lab 12/17/21  0400 12/16/21  1102 12/16/21  0449 12/15/21  0525   PH 7.42 7.44 7.44 7.45   PCO2 61* 60* 61* 63*   PO2 91 68* 76* 73*   HCO3 40* 41* 42* 43*   O2PER 100 90 85 60     No results found for this or any previous visit (from the past 24 hour(s)).    Labs: reviewed  Imaging: reviewed    Pb Jose MD   SICU Fellow

## 2021-12-17 NOTE — PLAN OF CARE
Neuro: RASS -5: No withdrawal. PERRL. Versed  Cardiac: SR. Levo   Pulmonary: ETT. Small creamy secretion.   GI: OG @ goal.   : Ivey. Adequate output.   Skin: Scattered bruising. Wound scab left upper lip.  Lines: Rt radial artery. RT triple lumen.  Drips: Levo + fentanyl + versed    Activity: bedrest  Restraints: NA     Plan: Possible trach + peg next week.

## 2021-12-17 NOTE — PROGRESS NOTES
Patient FiO2 is currently at 100% as needed to keep O2 above 90%. Veletri was turn off as it did not seem to improve patient PFO ratio. A  came and annoint the patient. It seems that family is leaning towards comfort care.

## 2021-12-18 NOTE — PLAN OF CARE
Neuro: Sedated, no withdrawal. RASS -5   Cardiac: SR.    Pulmonary: ETT. Small creamy secretion.   GI/: OG @ goal. Ivey in place, adequate UOP.   Skin: Scattered bruising. Wound scab left upper lip.  Lines: Rt radial artery. RT triple lumen.  Gtt: Levo, fentanyl, and versed

## 2021-12-18 NOTE — PROGRESS NOTES
Discussion with Mavis's daughter- Sandra and based on earlier family care conference and discussions, plan will be to transition to comfort care after having the chance to see/visit her as a family. We had additional conversation about her very critical condition and very poor prognosis.      Elena Gann MD  Pulmonary, Critical Care and Sleep Medicine  [facility]  Pager: [phone]

## 2021-12-18 NOTE — PROGRESS NOTES
ICU Progress Note    ICU Day: 5  Vent Day 5  Hosp Day: 20      HOSPITAL COURSE  75 yo female with PMH of DEX, DM aortic stenosis , hypothyroidism admit 11/28 with fever, dyspnea diagnosed with COVID PNA 11/28 (home test) though 2 week of symptoms prior. Unvaccinated and did not receive monoclonal Ig. In ED sats mid 80 on RA corrected with 3L however escalating and confused.  Initially admitted to floor on BiPAP however failed and intubtated and transferred to ICU 12/1  Requiring paralysis and proning    12/1 - transfer to ICU Bipap 100% 16/10   12/2 - Intubated, lines (CVC, Art line, Ivey, OG), paralyzed and proned   P/F ratio = 78.   12/3 - Sedation and paralytic weaned d/t bradycardia. Supined at [id number]; re-proned at 2000.  12/4 - prone, supine, sugars elevated, on/off low dose levophed   12/6- pronated for ~18 hr/day all weekend, on low dose levophed. Paralytic weaned off yesterday am  12/7- Very high FSBGs despite changes to sliding scale insulin and lantus yesterday, supinated 6 hours yesterday and pronated again.   12/8- supinated for 24 hours, PF ratio >150  12/10- pronated over the weekend. No appreciable improvement in PF ratio  12/15- slowly worsening PaO2/PF ratio  12/16- continuing to decline in oxygenation status, FiO2 up to 90% this am, PF ratio <100 this am  12/17-continuing to decline in oxygenation status, family discussion made and plans to transition to comfort care    ASSESSMENT/PLAN:  I have personally reviewed the daily labs, imaging studies, cultures and discussed the case with referring physician and consulting physicians.     Neuro  # Sedation for mechanical ventilation  # encephalopathy, metabolic  - continue fentanyl/versed for sedation,  - titrate sedation for a RASS of -2 to -3    Pulmonary  # ARDS secondary to covid 19 pneumonia, worsening oxygenation and ventilation.   # DEX  # Acute hypoxemic respiratory failure - oxygenation, mechanics acceptable    Ventilation Mode: CMV/AC   (Continuous Mandatory Ventilation/ Assist Control)  FiO2 (%): 100 %  Rate Set (breaths/minute): 18 breaths/min  Tidal Volume Set (mL): 400 mL  PEEP (cm H2O): 16 cmH2O  Oxygen Concentration (%): 100 %  Resp: 21  - low tidal volume, lung protection ventilation  - Proning and paralysis not likely to help at this stage of ARDS (proned and paralyzed in the past)  - albuterol nebs      Cardiac  # Shock - 2/2 sepsis, sedation, obstruction 2/2 positive pressure ventilation  # sinus bradycardia, improved  # Aortic stenosis   - wean/titrate levophed for goal MAP > 64    Renal  Fluid overload, net positive 7.7 L since admission  # cr, UOP appropriate   Hypernatremia, mild, stable  - strict I&O,   - Trial of spot diuresis  - electrolyte replacement protocols   - decrease free water to 100 ml q4     GI  # at risk for malnutrition  - continue tube feeds  - bowel regimen, PPI     Heme/Onc  # Coagulopathy 2/2 COVID-19  - monitor CBC  - intermediate dose lovenox    ID  # COVID-19 pneumonia, completed remdesivir , 1 dose toci  - dexamethasone 12mg, finished  - follow clinically     Endo  # DM2 w/ hyperglycemia secondary to steroids, received 19 U of sliding scale insulin yesterday  # Hypothyroidism  - monitor BG  - continue Lantus to 20 U BID  - continue PTA levothyroxine supplementation    PPX  1. DVT: enoxaparin   2. VAP: HOB 30 degrees, chlorhexidine rinse  3. Stress Ulcer: PPI  4. Restraints: Nonviolent soft two point restraints required and necessary for patient safety and continued cares and good effect as patient continues to pull at necessary lines, tubes despite education and distraction. Will readdress daily.   5. Wound care - per unit routine   6. Feeding - TF  7. Family:  Update today and plan to transition to comfort after discussion with family.    Elena Gann MD  Pulmonary, Critical Care and Sleep Medicine  [facility]  Pager: [phone]      Interval Hx:  Respiratory status continues to decline.  Daughter made patient DNR yesterday    /54   Pulse 66   Temp 99.1  F (37.3  C)   Resp 21   Wt 86.3 kg (190 lb 4.1 oz)   SpO2 92%   BMI 29.80 kg/m       Gen: intubated, sedated, supine, appears older than stated age, NAD  Neuro: sedated, does not respond to painful stimuli, +cough reflex  HEENT: anicteric, ETT in place, conjunctiva pink, AT/NC  Card: RRR, no m/r/g,NSR on monitor  Pulm: muffled, coarse breath sounds all fields (anterior exam)   Abd: soft, nontender, no palpable masses, no appreciable hepatosplenomegaly   MSK: trace edema, warm   Skin: no obvious rash     Ventilation Mode: CMV/AC  (Continuous Mandatory Ventilation/ Assist Control)  FiO2 (%): 100 %  Rate Set (breaths/minute): 18 breaths/min  Tidal Volume Set (mL): 400 mL  PEEP (cm H2O): 16 cmH2O  Oxygen Concentration (%): 100 %  Resp: 21        Intake/Output Summary (Last 24 hours) at 12/5/2021 0913  Last data filed at 12/5/2021 0600  Gross per 24 hour   Intake 2353.95 ml   Output 1165 ml   Net 1188.95 ml     ROUTINE ICU LABS (Last four results)  CMP  Recent Labs   Lab 12/18/21  0821 12/18/21  0421 12/18/21  0418 12/18/21  0005 12/17/21  0809 12/17/21  0400 12/16/21  0800 12/16/21  0449 12/15/21  0827 12/15/21  0525 12/13/21  0427 12/13/21  0416 12/11/21  1552 12/11/21  1429   NA  --   --  145*  --   --  144  --  144  --  145*   < > 146*   < > 148*   POTASSIUM  --   --  4.0  --   --  3.6  --  3.9  --  3.9   < > 3.6   < > 4.4   CHLORIDE  --   --  107  --   --  106  --  104  --  103   < > 104   < >  --    CO2  --   --  36*  --   --  38*  --  40*  --  40*   < > 39*   < >  --    ANIONGAP  --   --  2*  --   --  <1*  --  <1*  --  2*   < > 3   < >  --    * 145* 150* 176*   < > 214*   < > 197*   < > 222*   < > 223*   < >  --    BUN  --   --  27  --   --  29  --  32*  --  33*   < > 38*   < >  --    CR  --   --  0.68  --   --  0.62  --  0.64  --  0.65   < > 0.58   < >  --    GFRESTIMATED  --   --  86  --   --  88  --  88  --  87   < > 90   < >   --    LEENA  --   --  8.5  --   --  8.6  --  8.4*  --  8.7   < > 8.3*   < >  --    MAG  --   --   --   --   --   --   --   --   --   --   --  2.8*  --  2.8*   PHOS  --   --   --   --   --  4.2  --   --   --   --   --  4.1  --   --     < > = values in this interval not displayed.     CBC  Recent Labs   Lab 12/18/21 0418 12/17/21 0400 12/16/21  0449 12/15/21  0525   WBC 9.9 8.1 8.1 9.0   RBC 3.74* 3.66* 3.74* 3.81   HGB 11.1* 10.7* 10.9* 11.2*   HCT 37.2 36.0 36.7 37.5    98 98 98   MCH 29.7 29.2 29.1 29.4   MCHC 29.8* 29.7* 29.7* 29.9*   RDW 16.1* 15.9* 15.8* 15.5*   * 141* 148* 157     INR  No lab results found in last 7 days.  Arterial Blood Gas  Recent Labs   Lab 12/18/21 0419 12/17/21 0400 12/16/21  1102 12/16/21  0449   PH 7.39 7.42 7.44 7.44   PCO2 62* 61* 60* 61*   PO2 86 91 68* 76*   HCO3 38* 40* 41* 42*   O2PER 100 100 90 85     No results found for this or any previous visit (from the past 24 hour(s)).    Labs: reviewed  Imaging: reviewed    Elena Gann MD  Pulmonary, Critical Care and Sleep Medicine  [facility]  Pager: [phone]      I spent 45 minutes of critical care time excluding procedures

## 2021-12-18 NOTE — PROGRESS NOTES
Family visiting in preparation for transitioning to comfort care. Discussed with daughterSandra, the process and how death is different for each person. Rhythm strips of mother's heartbeat made for the family, very appreciative. Offered spiritual services but family declined at this time.     1400 Patient transitioned to comfort care at this time. FiO2 and peep weaned as ordered. Family outside patient's room expressing grief appropriately.     1641 Patient asystole, MD pronounced. Record of death completed. No belongings with patient per daughter.

## 2021-12-18 NOTE — PROGRESS NOTES
VSS, currently on levo @ 0.06, versed @ 3 and fentanyl @ 100. TF @ 60 and H2O flush 100q4H. Family still discussing comfort care.

## 2021-12-19 NOTE — DISCHARGE SUMMARY
DISCHARGE AND DEATH SUMMARY     ADMIT DATE: 11/28/2021  DATE AND TIME OF DEATH: 12/18/2021, 441 PM  ATTENDING PROVIDER: DENIS PANIAGUA MD    PRINCIPLE DIAGNOSIS  1.ARDS secondary to covid 19 pneumonia    SECONDARY DIAGNOSIS  1. Acute hypoxic respiratory failure  2. Severe sepsis with shock  3. DEX  4. Diabetes Mellitis with Hyperglycemia  5. Aortic stenosis  6. Obesity Grade 1      RELEVANT IMAGING AND PROCEDURES  CT CHEST PULMONARY EMBOLISM W CONTRAST 11/28/2021  IMPRESSION:  1.  Multifocal ground-glass opacities suspicious for COVID-19. There is, however, more focal consolidation in the perihilar aspect of the right lower lobe that may represent superimposed bacterial pneumonia.  2.  No evidence of pulmonary embolism.     CONSULTATIONS  1. Nutrition    BRIEF HOSPITAL COURSE  75 yo female with PMH of DEX, DM aortic stenosis , hypothyroidism who was admitted on 11/28/21 for COVID PNA 11/28/21, she was unvaccinated.  In ED, O2 sats were in the mid 80's on room air and corrected with 3 LPM. CT chest was in keeping with extensive pneumonia due to covid 19. Initially admitted to the floor on BiPAP however failed and was intubated and transferred to ICU on 12/1/21.  Despite aggressive therapy including full paralysis, proning, full strength veletri and maximal vent settings, her condition continued to deteriorate with worsening oxygenation. She had received full course of dexamethasone, one time dose of tocilizumab and remdesivir.  Family care conference and discussion was held with her family to discuss her goals of care and decision was made to transition to comfort on 12/18/2021 due to her very poor prognosis.    Denis Paniagua MD  Pulmonary, Critical Care and Sleep Medicine  [facility]  Pager: [phone]

## 2024-04-26 NOTE — PLAN OF CARE
Care from [id number]:  Veletri at half strength today, tolerating well. Continues on 60% FIO2 and 14 PEEP. Dulcolax suppository given with no results-daily miralax added to MAR. Starting to wean sedation.    N: Opens eyes to vigorous stimulus/pain. Does not follow commands or withdraw to stimulus. PEERLA. On versed and fentanyl gtts.   CV: SB. Low dose levo to keep MAP>65.   LS: See above note. LS clear/coarse.  GI/: Ivey with adequate output. Responded well to diuresis today. TF at goal through OG. 250 FWFQ4.  IV: Versed, fentanyl, and insulin through CVC. R radial daron. L PIV SL   Family updated over phone   Stable based on symptoms and exam. Follow established treatment plan. Follow up at least annually.